# Patient Record
Sex: FEMALE | Race: BLACK OR AFRICAN AMERICAN | NOT HISPANIC OR LATINO | Employment: FULL TIME | URBAN - METROPOLITAN AREA
[De-identification: names, ages, dates, MRNs, and addresses within clinical notes are randomized per-mention and may not be internally consistent; named-entity substitution may affect disease eponyms.]

---

## 2018-02-20 PROBLEM — E04.1 THYROID NODULE: Status: ACTIVE | Noted: 2018-02-20

## 2018-02-20 PROBLEM — M18.12 OSTEOARTHRITIS OF LEFT THUMB: Status: ACTIVE | Noted: 2018-02-20

## 2018-02-20 PROBLEM — D70.9 NEUTROPENIA: Status: ACTIVE | Noted: 2018-02-20

## 2018-05-22 ENCOUNTER — OFFICE VISIT (OUTPATIENT)
Dept: INTERNAL MEDICINE | Facility: CLINIC | Age: 48
End: 2018-05-22
Payer: COMMERCIAL

## 2018-05-22 VITALS
TEMPERATURE: 98 F | SYSTOLIC BLOOD PRESSURE: 146 MMHG | WEIGHT: 135 LBS | BODY MASS INDEX: 24.84 KG/M2 | OXYGEN SATURATION: 97 % | RESPIRATION RATE: 16 BRPM | HEART RATE: 88 BPM | DIASTOLIC BLOOD PRESSURE: 88 MMHG | HEIGHT: 62 IN

## 2018-05-22 DIAGNOSIS — R10.31 COLICKY RLQ ABDOMINAL PAIN: Primary | ICD-10-CM

## 2018-05-22 DIAGNOSIS — I10 HTN (HYPERTENSION), BENIGN: ICD-10-CM

## 2018-05-22 DIAGNOSIS — Z87.448 HISTORY OF SIMPLE RENAL CYST: ICD-10-CM

## 2018-05-22 DIAGNOSIS — Z12.39 BREAST CANCER SCREENING: ICD-10-CM

## 2018-05-22 DIAGNOSIS — N39.0 URINARY TRACT INFECTION WITHOUT HEMATURIA, SITE UNSPECIFIED: ICD-10-CM

## 2018-05-22 DIAGNOSIS — Z13.220 SCREENING FOR HYPERLIPIDEMIA: ICD-10-CM

## 2018-05-22 DIAGNOSIS — K76.89 HEPATIC CYST: ICD-10-CM

## 2018-05-22 DIAGNOSIS — Z86.39 HISTORY OF THYROID NODULE: ICD-10-CM

## 2018-05-22 DIAGNOSIS — N83.201 CYST OF RIGHT OVARY: ICD-10-CM

## 2018-05-22 PROCEDURE — 3077F SYST BP >= 140 MM HG: CPT | Mod: ,,, | Performed by: INTERNAL MEDICINE

## 2018-05-22 PROCEDURE — 99214 OFFICE O/P EST MOD 30 MIN: CPT | Mod: ,,, | Performed by: INTERNAL MEDICINE

## 2018-05-22 PROCEDURE — 3079F DIAST BP 80-89 MM HG: CPT | Mod: ,,, | Performed by: INTERNAL MEDICINE

## 2018-05-22 PROCEDURE — 3008F BODY MASS INDEX DOCD: CPT | Mod: ,,, | Performed by: INTERNAL MEDICINE

## 2018-05-22 RX ORDER — LISINOPRIL 10 MG/1
10 TABLET ORAL DAILY
Qty: 90 TABLET | Refills: 1 | Status: SHIPPED | OUTPATIENT
Start: 2018-05-22 | End: 2019-09-19 | Stop reason: SDUPTHER

## 2018-05-22 RX ORDER — CIPROFLOXACIN 500 MG/1
500 TABLET ORAL EVERY 12 HOURS
Qty: 20 TABLET | Refills: 0 | Status: SHIPPED | OUTPATIENT
Start: 2018-05-22 | End: 2018-06-06

## 2018-05-22 NOTE — PATIENT INSTRUCTIONS
High Blood Pressure, New, Begin Treatment  Your blood pressure was high enough today to start treatment with medicines. Often health care providers dont know what causes high blood pressure (hypertension). But it can be controlled with lifestyle changes and medicines. High blood pressure usually has no symptoms. But it can sometimes cause headache, dizziness, blurred vision, a rushing sound in your ears, chest pain, or shortness of breath. But even without symptoms, high blood pressure thats not treated raises your risk for heart attack and stroke. High blood pressure is a serious health risk and shouldnt be ignored.    A blood pressure reading is made up of two numbers: a higher number over a lower number. The top number is the systolic pressure. The bottom number is the diastolic pressure. A normal blood pressure is a systolic pressure of less than 120 over a diastolic pressure less than 80. You will see your blood pressure readings written together. For example, a person with a systolic pressure of 118 and a diastolic pressure of 78 will have 118/78 written in the medical record.  High blood pressure is when either the top number is 140 or higher, or the bottom number is 90 or higher. High blood pressure is diagnosed when multiple, separate readings show blood pressures above 140/90. The blood pressures between normal and high are called prehypertension.  Home care  If you have high blood pressure, you should do what is listed below to lower your blood pressure. If you are taking medicines for high blood pressure, these methods may reduce or end your need for medicines in the future.  · Begin a weight-loss program if you are overweight.  · Cut back on how much salt you get in your diet. Heres how to do this:  ¨ Dont eat foods that have a lot of salt. These include olives, pickles, smoked meats, and salted potato chips.  ¨ Dont add salt to your food at the table.  ¨ Use only small amounts of salt when  cooking.  ¨ Review food labels to track how much salt is in prepared foods.  ¨ When eating out, ask that no additional salt be added to your food order.  · Begin an exercise program. Talk with your health care provider about the type of exercise program that would be best for you. It doesn't have to be hard. Even brisk walking for 20 minutes 3 times a week is a good form of exercise.  · Dont take medicines that have heart stimulants. This includes many over-the-counter cold and sinus decongestant pills and sprays, as well as diet pills. Check the warnings about hypertension on the label. Before purchasing any over-the-counter medicines or supplements, always ask the pharmacist about the product's potential interaction with your high blood pressure and your high blood pressure medicines.  · Stimulants such as amphetamine or cocaine could be lethal for someone with high blood pressure. Never take these.  · Limit how much caffeine you get in your diet. Switch to caffeine-free products.  · Stop smoking. If you are a long-time smoker, this can be hard. Enroll in a stop-smoking program to make it more likely that you will quit for good.  · Learn how to handle stress. This is an important part of any program to lower blood pressure. Learn about relaxation methods like meditation, yoga, or biofeedback.  · If your provider prescribed medicines, take them exactly as directed. Missing doses may cause your blood pressure get out of control.  · If you miss a dose or doses, check with your healthcare provider or pharmacist about what to do.  · Consider buying an automatic blood pressure machine. Your provider can make a recommendation. You can get one of these at most pharmacies.  The American Heart Association recommends the following guidelines for home blood pressure monitoring:  · Don't smoke or drink coffee for 30 minutes before taking your blood pressure.  · Go to the bathroom before the test.  · Relax for 5 minutes before  taking the measurement.  · Sit with your back supported (don't sit on a couch or soft chair); keep your feet on the floor uncrossed. Place your arm on a solid flat surface (like a table) with the upper part of the arm at heart level. Place the middle of the cuff directly above the eye of the elbow. Check the monitor's instruction manual for an illustration.  · Take multiple readings. When you measure, take 2 to 3 readings one minute apart and record all of the results.  · Take your blood pressure at the same time every day, or as your healthcare provider recommends.  · Record the date, time, and blood pressure reading.  · Take the record with you to your next medical appointment. If your blood pressure monitor has a built-in memory, simply take the monitor with you to your next appointment.  · Call your provider if you have several high readings. Don't be frightened by a single high blood pressure reading, but if you get several high readings, check in with your healthcare provider.  · Note: When blood pressure reaches a systolic (top number) of 180 or higher OR diastolic (bottom number) of 110 or higher, seek emergency medical treatment.  Follow-up care  Because a new blood pressure medicine was started today, its important that you have your blood pressure rechecked. This is to make sure that the medicine is working and that you have no serious side effects. Keep all your follow up appointments. Write down medicine and blood pressure questions and bring them to your next appointment. If you have pressing concerns about your new medicine or your blood pressure, call your provider. Unless told otherwise, follow up with your health care provider or this facility within the next 3 days.  When to seek medical advice  Call your healthcare provider right away if any of these occur:  · Blood pressure reaches a systolic (top number) of 180 or higher, OR diastolic (bottom number) of 110 or higher, seek emergency medical  treatment.  · Chest pain or shortness of breath  · Severe headache  · Throbbing or rushing sound in the ears  · Nosebleed  · Sudden severe pain in your belly (abdomen)  · Extreme drowsiness, confusion, or fainting  · Dizziness or dizziness with a spinning sensation (vertigo)  · Weakness of an arm or leg or one side of the face  · You have problems speaking or seeing   Date Last Reviewed: 12/1/2016  © 3381-2394 [employer]. [address] [place] [id number]. All rights reserved. This information is not intended as a substitute for professional medical care. Always follow your healthcare professional's instructions.

## 2018-05-22 NOTE — PROGRESS NOTES
SUBJECTIVE:    Patient ID: Jada Fcuhs is a 48 y.o. female.    Chief Complaint: Follow-up; Hypothyroidism; and Hypertension (went to urgent care for uti on 04/27/18 bp was 173/104)    HPI    Patient went to urgent care with dysuria and she had headache and BP was 173/104--She got treated for UTI and she felt better-she has had some pain in the lower abdomen and she is going to have AF/U from colonoscopy -she has had two more episodes of the lower right side which is intense-has to ball up and take ibuprofen 800 mg and tramadol she had from past-pain comes without radiation, longest lasting for an hour but usually 20-30 min-crescendo-decrescendo pattern-no BM associated with same-BM's are normal-has appendix-CT right lower quadrant , multiple hepatic cysts, and a right renal cyst but that was 2016-another ev al included dx of IB'S and sounds like librax was given and gave some relief-she has alternating constipation and diarrhea-      Past Medical History:   Diagnosis Date    Anemia     probably secondary to fibroid    Blood transfusion     History of uterine fibroid     Hypothyroidism     Wears glasses     CONTACS     Social History     Social History    Marital status:      Spouse name: N/A    Number of children: N/A    Years of education: N/A     Occupational History    Not on file.     Social History Main Topics    Smoking status: Never Smoker    Smokeless tobacco: Never Used    Alcohol use Yes      Comment: OCC    Drug use: No    Sexual activity: Not on file     Other Topics Concern    Not on file     Social History Narrative    No narrative on file     Past Surgical History:   Procedure Laterality Date    BREAST SURGERY      mass removed    HYSTERECTOMY       Family History   Problem Relation Age of Onset    Cancer Maternal Grandfather         prostate    Hypertension Mother     Seizures Maternal Grandmother     Aneurysm Father        Review of Systems   Constitutional:  Negative for appetite change, chills, diaphoresis, fatigue, fever and unexpected weight change.   HENT: Negative for congestion, ear pain, hearing loss, nosebleeds, postnasal drip, sinus pain, sinus pressure, sneezing, sore throat, tinnitus, trouble swallowing and voice change.    Eyes: Negative for photophobia, pain, itching and visual disturbance.   Respiratory: Negative for apnea, cough, chest tightness, shortness of breath, wheezing and stridor.    Cardiovascular: Negative for chest pain, palpitations and leg swelling.   Gastrointestinal: Negative for abdominal distention, abdominal pain, blood in stool, constipation, diarrhea, nausea and vomiting.        HPI   Endocrine: Negative for cold intolerance, heat intolerance, polydipsia and polyuria.   Genitourinary: Negative for difficulty urinating, dyspareunia, dysuria, flank pain, frequency, hematuria, menstrual problem, pelvic pain, urgency, vaginal discharge and vaginal pain.   Musculoskeletal: Negative for arthralgias, back pain, joint swelling, myalgias, neck pain and neck stiffness.   Skin: Negative for pallor.   Allergic/Immunologic: Negative for environmental allergies and food allergies.   Neurological: Negative for dizziness, tremors, speech difficulty, weakness, light-headedness and numbness.   Hematological: Does not bruise/bleed easily.   Psychiatric/Behavioral: Negative for agitation, confusion, decreased concentration, sleep disturbance and suicidal ideas. The patient is not nervous/anxious.           Objective:      Physical Exam   Constitutional: She is oriented to person, place, and time. She appears well-developed and well-nourished. She is cooperative. No distress.   HENT:   Head: Normocephalic and atraumatic.   Right Ear: Tympanic membrane normal.   Left Ear: Tympanic membrane normal.   Mouth/Throat: Uvula is midline and mucous membranes are normal.   Eyes: Conjunctivae, EOM and lids are normal. Pupils are equal, round, and reactive to light.  Right pupil is round and reactive. Left pupil is round and reactive.   Neck: Trachea normal and normal range of motion. Neck supple. No JVD present. No thyromegaly present.   Cardiovascular: Normal rate, regular rhythm and normal heart sounds.    Pulmonary/Chest: Effort normal and breath sounds normal. No tachypnea. No respiratory distress.   Abdominal: Soft. Bowel sounds are normal. There is no tenderness.   No tenderness-bowel sounds normal   Musculoskeletal: Normal range of motion.   Lymphadenopathy:     She has no cervical adenopathy.   Neurological: She is alert and oriented to person, place, and time. She has normal strength.   Skin: Skin is warm and dry. No rash noted.   Psychiatric: She has a normal mood and affect. Her speech is normal.   Nursing note and vitals reviewed.          Assessment:       1. Colicky RLQ abdominal pain    2. HTN (hypertension), benign    3. History of thyroid nodule    4. History of simple renal cyst    5. Hepatic cyst    6. Cyst of right ovary    7. Breast cancer screening    8. Screening for hyperlipidemia    9. Urinary tract infection without hematuria, site unspecified         Plan:           Colicky RLQ abdominal pain  -     CBC auto differential; Future; Expected date: 05/22/2018  -     Comprehensive metabolic panel; Future; Expected date: 05/22/2018    Hypertension        -     Lisinopril 10 mg no 90 with 1 refill    History of thyroid nodule  -     US Soft Tissue Head Neck Thyroid; Future; Expected date: 05/22/2018    History of simple renal cyst  -     TSH; Future; Expected date: 05/22/2018  -     POCT Urinalysis    Hepatic cyst        -     Re-evaluate on CT    Cyst of right ovary        -     Evaluate on CT    Breast cancer screening  -     Mammo Digital Screening Bilat with Tomos; Future; Expected date: 05/22/2018    Screening for hyperlipidemia  -     Lipid panel; Future; Expected date: 05/22/2018    Urinary Tract Infection  -     Cipro 500 bid for ten days no  20

## 2018-05-23 ENCOUNTER — TELEPHONE (OUTPATIENT)
Dept: INTERNAL MEDICINE | Facility: CLINIC | Age: 48
End: 2018-05-23

## 2018-05-23 NOTE — TELEPHONE ENCOUNTER
Told the patient and she will come in for a bp check in one week    She would like to know what vitamin you recommended at her visis?

## 2018-05-23 NOTE — TELEPHONE ENCOUNTER
----- Message from Dary Ace MD sent at 5/23/2018  2:25 PM CDT -----  Needs BP recheck in a week-Nurse visit

## 2018-05-23 NOTE — TELEPHONE ENCOUNTER
----- Message from Sherry Ibrahim sent at 5/23/2018 10:43 AM CDT -----  Contact: Shanice Rodrigues was seen in office yesterday and is requesting the name of the vitamin Dr. Ace recommended to her.

## 2018-06-05 LAB
ALBUMIN SERPL-MCNC: 4.4 G/DL (ref 3.1–4.7)
ALP SERPL-CCNC: 47 IU/L (ref 40–104)
ALT (SGPT): 13 IU/L (ref 3–33)
AST SERPL-CCNC: 21 IU/L (ref 10–40)
BASOPHILS NFR BLD: 0 K/UL (ref 0–0.2)
BASOPHILS NFR BLD: 0.2 %
BILIRUB SERPL-MCNC: 0.6 MG/DL (ref 0.3–1)
BUN SERPL-MCNC: 14 MG/DL (ref 8–20)
CALCIUM SERPL-MCNC: 9.2 MG/DL (ref 7.7–10.4)
CHLORIDE: 103 MMOL/L (ref 98–110)
CO2 SERPL-SCNC: 25.3 MMOL/L (ref 22.8–31.6)
CREATININE: 0.65 MG/DL (ref 0.6–1.4)
EOSINOPHIL NFR BLD: 0.1 K/UL (ref 0–0.7)
EOSINOPHIL NFR BLD: 1.2 %
ERYTHROCYTE [DISTWIDTH] IN BLOOD BY AUTOMATED COUNT: 13.2 % (ref 11.7–14.9)
GLUCOSE: 77 MG/DL (ref 70–99)
GRAN #: 2.8 K/UL (ref 1.4–6.5)
GRAN%: 69.5 %
HCT VFR BLD AUTO: 37.7 % (ref 36–48)
HGB BLD-MCNC: 12.5 G/DL (ref 12–15)
IMMATURE GRANS (ABS): 0 K/UL (ref 0–1)
IMMATURE GRANULOCYTES: 0.2 %
LYMPH #: 0.9 K/UL (ref 1.2–3.4)
LYMPH%: 22.7 %
MCH RBC QN AUTO: 29.1 PG (ref 25–35)
MCHC RBC AUTO-ENTMCNC: 33.2 G/DL (ref 31–36)
MCV RBC AUTO: 87.7 FL (ref 79–98)
MONO #: 0.3 K/UL (ref 0.1–0.6)
MONO%: 6.2 %
NUCLEATED RBCS: 0 %
PLATELET # BLD AUTO: 168 K/UL (ref 140–440)
PMV BLD AUTO: 12.7 FL (ref 8.8–12.7)
POTASSIUM SERPL-SCNC: 4 MMOL/L (ref 3.5–5)
PROT SERPL-MCNC: 7.2 G/DL (ref 6–8.2)
RBC # BLD AUTO: 4.3 M/UL (ref 3.5–5.5)
SODIUM: 137 MMOL/L (ref 134–144)
TSH SERPL DL<=0.005 MIU/L-ACNC: 1 ULU/ML (ref 0.3–5.6)
WBC # BLD AUTO: 4.1 K/UL (ref 5–10)

## 2018-06-06 ENCOUNTER — OFFICE VISIT (OUTPATIENT)
Dept: INTERNAL MEDICINE | Facility: CLINIC | Age: 48
End: 2018-06-06
Payer: COMMERCIAL

## 2018-06-06 VITALS
HEIGHT: 62 IN | RESPIRATION RATE: 19 BRPM | WEIGHT: 137 LBS | DIASTOLIC BLOOD PRESSURE: 85 MMHG | SYSTOLIC BLOOD PRESSURE: 135 MMHG | HEART RATE: 89 BPM | BODY MASS INDEX: 25.21 KG/M2 | OXYGEN SATURATION: 99 % | TEMPERATURE: 99 F

## 2018-06-06 DIAGNOSIS — L65.9 HAIR LOSS: ICD-10-CM

## 2018-06-06 DIAGNOSIS — G89.29 CHRONIC RLQ PAIN: Primary | ICD-10-CM

## 2018-06-06 DIAGNOSIS — R10.31 CHRONIC RLQ PAIN: Primary | ICD-10-CM

## 2018-06-06 PROCEDURE — 3079F DIAST BP 80-89 MM HG: CPT | Mod: ,,, | Performed by: NURSE PRACTITIONER

## 2018-06-06 PROCEDURE — 3008F BODY MASS INDEX DOCD: CPT | Mod: ,,, | Performed by: NURSE PRACTITIONER

## 2018-06-06 PROCEDURE — 3075F SYST BP GE 130 - 139MM HG: CPT | Mod: ,,, | Performed by: NURSE PRACTITIONER

## 2018-06-06 PROCEDURE — 99214 OFFICE O/P EST MOD 30 MIN: CPT | Mod: ,,, | Performed by: NURSE PRACTITIONER

## 2018-06-06 RX ORDER — ACETAMINOPHEN AND PHENYLEPHRINE HCL 325; 5 MG/1; MG/1
1 TABLET ORAL DAILY
COMMUNITY
Start: 2018-06-06 | End: 2020-02-19

## 2018-06-06 RX ORDER — DICYCLOMINE HYDROCHLORIDE 10 MG/1
10 CAPSULE ORAL
Qty: 120 CAPSULE | Refills: 1 | Status: SHIPPED | OUTPATIENT
Start: 2018-06-06 | End: 2019-09-05

## 2018-06-06 NOTE — PROGRESS NOTES
"    SUBJECTIVE:      Patient ID: Jada Fuchs is a 48 y.o. female.    Chief Complaint: Abdominal Pain (had episode last night (Lasted about 30 minutes- guac with dinner, CT denied by [employer]. Other labs and DI in chart for review.)    C/o ongoing RLQ abd pain, states no one seems to be able to tell her what's wrong, tired of feeling bad, can't associate episodes with any food or activity, "exhausted from episodes of pain"    Had colonoscopy with JAMAL Armenta in August    Scheduled appt with gyn in August to see if it's female-related issue      Abdominal Pain   This is a recurrent problem. The current episode started more than 1 year ago. The onset quality is sudden. The problem occurs every several days. Duration: 30 min. The problem has been unchanged. The pain is located in the RLQ. The pain is at a severity of 10/10. The pain is severe. The quality of the pain is colicky. The abdominal pain radiates to the RLQ. Associated symptoms include flatus (occasional). Pertinent negatives include no anorexia, arthralgias, belching, constipation, diarrhea, dysuria, fever, frequency, headaches, hematochezia, hematuria, melena, myalgias, nausea, vomiting or weight loss. The pain is aggravated by certain positions and eating. The pain is relieved by nothing. She has tried oral narcotic analgesics (gas pills) for the symptoms. The treatment provided no relief. Prior diagnostic workup includes lower endoscopy, GI consult and ultrasound (CT denied by insurance). There is no history of abdominal surgery, colon cancer, Crohn's disease, gallstones, GERD, irritable bowel syndrome, pancreatitis, PUD or ulcerative colitis. Patient's medical history does not include kidney stones and UTI.       Past Surgical History:   Procedure Laterality Date    BREAST SURGERY      mass removed    HYSTERECTOMY       Family History   Problem Relation Age of Onset    Cancer Maternal Grandfather         prostate    Hypertension Mother     " Seizures Maternal Grandmother     Aneurysm Father       Social History     Social History    Marital status:      Spouse name: N/A    Number of children: N/A    Years of education: N/A     Social History Main Topics    Smoking status: Never Smoker    Smokeless tobacco: Never Used    Alcohol use Yes      Comment: OCC    Drug use: No    Sexual activity: Not Asked     Other Topics Concern    None     Social History Narrative    None     Current Outpatient Prescriptions   Medication Sig Dispense Refill    lisinopril 10 MG tablet Take 1 tablet (10 mg total) by mouth once daily. 90 tablet 1    biotin 10,000 mcg Cap Take 1 capsule by mouth once daily.      dicyclomine (BENTYL) 10 MG capsule Take 1 capsule (10 mg total) by mouth 4 (four) times daily before meals and nightly. 120 capsule 1     No current facility-administered medications for this visit.      Review of patient's allergies indicates:  No Known Allergies   Past Medical History:   Diagnosis Date    Abdominal pain     Anemia     probably secondary to fibroid    Blood transfusion     History of uterine fibroid     Hypothyroidism     Wears glasses     CONTACS     Past Surgical History:   Procedure Laterality Date    BREAST SURGERY      mass removed    HYSTERECTOMY         Review of Systems   Constitutional: Negative for activity change, appetite change, fatigue, fever, unexpected weight change and weight loss.   HENT: Negative for congestion, ear pain, hearing loss, postnasal drip, sinus pain, sinus pressure, sneezing and sore throat.    Eyes: Negative for photophobia and pain.   Respiratory: Negative for cough, chest tightness, shortness of breath and wheezing.    Cardiovascular: Negative for chest pain, palpitations and leg swelling.   Gastrointestinal: Positive for abdominal pain and flatus (occasional). Negative for abdominal distention, anorexia, blood in stool, constipation, diarrhea, hematochezia, melena, nausea and vomiting.  "  Endocrine: Negative for cold intolerance, heat intolerance, polydipsia and polyuria.        Losing hair on scalp   Genitourinary: Positive for dyspareunia (rare). Negative for difficulty urinating, dysuria, flank pain, frequency, hematuria, pelvic pain and urgency.   Musculoskeletal: Negative for arthralgias, back pain, joint swelling, myalgias and neck pain.   Skin: Negative for pallor.   Allergic/Immunologic: Negative for environmental allergies and food allergies.   Neurological: Negative for dizziness, weakness, light-headedness, numbness and headaches.   Hematological: Does not bruise/bleed easily.   Psychiatric/Behavioral: Negative for agitation, confusion, decreased concentration and sleep disturbance. The patient is not nervous/anxious.       OBJECTIVE:      Vitals:    06/06/18 1514   BP: 135/85   Pulse: 89   Resp: 19   Temp: 98.5 °F (36.9 °C)   SpO2: 99%   Weight: 62.1 kg (137 lb)   Height: 5' 2" (1.575 m)     Physical Exam   Constitutional: She is oriented to person, place, and time. Vital signs are normal. She appears well-developed and well-nourished. She is cooperative. No distress.   HENT:   Head: Normocephalic and atraumatic.   Right Ear: Hearing normal.   Left Ear: Hearing normal.   Nose: Nose normal. No rhinorrhea.   Mouth/Throat: Uvula is midline, oropharynx is clear and moist and mucous membranes are normal.   Eyes: Conjunctivae and lids are normal. Pupils are equal, round, and reactive to light. Right eye exhibits no discharge. Left eye exhibits no discharge. Right conjunctiva is not injected. Left conjunctiva is not injected. Right pupil is round and reactive. Left pupil is round and reactive. Pupils are equal.   Neck: Trachea normal and normal range of motion. Neck supple. No JVD present. No tracheal deviation present. No thyromegaly present.   Cardiovascular: Normal rate, regular rhythm, normal heart sounds and intact distal pulses.  Exam reveals no gallop and no friction rub.    No murmur " heard.  Pulses:       Radial pulses are 2+ on the right side, and 2+ on the left side.   Pulmonary/Chest: Effort normal and breath sounds normal. No stridor. No respiratory distress. She has no decreased breath sounds. She has no wheezes. She has no rhonchi. She has no rales.   Abdominal: Soft. Normal appearance and bowel sounds are normal. She exhibits no distension and no abdominal bruit. There is no tenderness. There is no rigidity and no guarding.   Musculoskeletal: Normal range of motion. She exhibits no edema.   Lymphadenopathy:     She has no cervical adenopathy.   Neurological: She is alert and oriented to person, place, and time. She has normal strength. She displays no atrophy. She displays a negative Romberg sign. Coordination and gait normal. GCS eye subscore is 4. GCS verbal subscore is 5. GCS motor subscore is 6.   Skin: Skin is warm and dry. Capillary refill takes less than 2 seconds. No lesion and no rash noted. No cyanosis. No pallor.   Psychiatric: She has a normal mood and affect. Her speech is normal and behavior is normal. Judgment and thought content normal. Cognition and memory are normal. She is attentive.   Nursing note and vitals reviewed.     Assessment:       1. Chronic RLQ pain    2. Hair loss        Plan:       Chronic RLQ pain  -     US Abdomen Complete  -     US Pelvis Complete Non OB; Future; Expected date: 06/06/2018  -     dicyclomine (BENTYL) 10 MG capsule; Take 1 capsule (10 mg total) by mouth 4 (four) times daily before meals and nightly.  Dispense: 120 capsule; Refill: 1    Hair loss  -     biotin 10,000 mcg Cap; Take 1 capsule by mouth once daily.      Follow-up in about 4 weeks (around 7/4/2018) for imaging results.      6/10/2018 VIRAJ Camacho, KOMAL[facility]-C

## 2018-06-06 NOTE — PATIENT INSTRUCTIONS
Abdominal Pain    Abdominal pain is pain in the stomach or belly area. Everyone has this pain from time to time. In many cases it goes away on its own. But abdominal pain can sometimes be due to a serious problem, such as appendicitis. So its important to know when to seek help.  Causes of abdominal pain  There are many possible causes of abdominal pain. Common causes in adults include:  · Constipation, diarrhea, or gas  · Stomach acid flowing back up into the esophagus (acid reflux or heartburn)  · Severe acid reflux, called GERD (gastroesophageal reflux disease)  · A sore in the lining of the stomach or small intestine (peptic ulcer)  · Inflammation of the gallbladder, liver, or pancreas  · Gallstones or kidney stones  · Appendicitis   · Intestinal blockage   · An internal organ pushing through a muscle or other tissue (hernia)  · Urinary tract infections  · In women, menstrual cramps, fibroids, or endometriosis  · Inflammation or infection of the intestines  Diagnosing the cause of abdominal pain  Your healthcare provider will do a physical exam help find the cause of your pain. If needed, tests will be ordered. Belly pain has many possible causes. So it can be hard to find the reason for your pain. Giving details about your pain can help. Tell your provider where and when you feel the pain, and what makes it better or worse. Also let your provider know if you have other symptoms such as:  · Fever  · Tiredness  · Upset stomach (nausea)  · Vomiting  · Changes in bathroom habits  Treating abdominal pain  Some causes of pain need emergency medical treatment right away. These include appendicitis or a bowel blockage. Other problems can be treated with rest, fluids, or medicines. Your healthcare provider can give you specific instructions for treatment or self-care based on what is causing your pain.  If you have vomiting or diarrhea, sip water or other clear fluids. When you are ready to eat solid foods again,  start with small amounts of easy-to-digest, low-fat foods. These include apple sauce, toast, or crackers.   When to seek medical care  Call 911 or go to the hospital right away if you:  · Cant pass stool and are vomiting  · Are vomiting blood or have bloody diarrhea or black, tarry diarrhea  · Have chest, neck, or shoulder pain  · Feel like you might pass out  · Have pain in your shoulder blades with nausea  · Have sudden, severe belly pain  · Have new, severe pain unlike any you have felt before  · Have a belly that is rigid, hard, and tender to touch  Call your healthcare provider if you have:  · Pain for more than 5 days  · Bloating for more than 2 days  · Diarrhea for more than 5 days  · A fever of 100.4°F (38.0°C) or higher, or as directed by your provider  · Pain that gets worse  · Weight loss for no reason  · Continued lack of appetite  · Blood in your stool  How to prevent abdominal pain  Here are some tips to help prevent abdominal pain:  · Eat smaller amounts of food at one time.  · Avoid greasy, fried, or other high-fat foods.  · Avoid foods that give you gas.  · Exercise regularly.  · Drink plenty of fluids.  To help prevent GERD symptoms:  · Quit smoking.  · Reduce alcohol and certain foods that increase stomach acid.  · Avoid aspirin and over-the-counter pain and fever medicines (NSAIDS or nonsteroidal anti-inflammatory drugs), if possible  · Lose extra weight.  · Finish eating at least 2 hours before you go to bed or lie down.  · Raise the head of your bed.  Date Last Reviewed: 7/1/2016  © 1885-6261 [employer]. [address] [place] [address], [place] [address]. All rights reserved. This information is not intended as a substitute for professional medical care. Always follow your healthcare professional's instructions.        Unknown Causes of Abdominal Pain (Female)    The exact cause of your abdominal (stomach) pain is not clear. This does not mean that this is something to worry about.  Everyone likes to know the exact cause of the problem, but sometimes with abdominal pain, there is no clear-cut cause, and this could be a good thing. The good news is that your symptoms can be treated, and you will feel better.   Your condition does not seem serious now; however, sometimes the signs of a serious problem may take more time to appear. For this reason, it is important for you to watch for any new symptoms, problems, or worsening of your condition.  Over the next few days, the abdominal pain may come and go, or be continuous. Other common symptoms can include nausea and vomiting. Sometimes it can be difficult to tell if you feel nauseous, you may just feel bad and not associate that feeling with nausea. Constipation, diarrhea, and a fever may go along with the pain.  The pain may continue even if treated correctly over the following days. Depending on how things go, sometimes the cause can become clear and may require further or different treatment. Additional evaluations, medications, or tests may also be needed.  Home care  Your healthcare provider may prescribe medicine for pain, symptoms, or an infection.  Follow the healthcare provider's instructions for taking these medicines.  General care  · Rest as much as you can until your next exam. No strenuous activities.  · Try to find positions that ease discomfort. A small pillow placed on the abdomen may help relieve pain.  · Something warm on your abdomen (such as a heating pad) may help, but be careful not to burn yourself.  Diet  · Do not force yourself to eat, especially if having cramps, vomiting, or diarrhea.  · Water is important so you do not get dehydrated. Soup may also be good. Sports drinks may also help, especially if they are not too acidic. Make sure you don't drink sugary drinks as this can make things worse. Take liquids in small amounts. Do not guzzle them.  · Caffeine sometimes makes the pain and cramping worse.  · Avoid dairy  products if you have vomiting or diarrhea.  · Don't eat large amounts at a time. Wait a few minutes between bites.  · Eat a diet low in fiber (called a low-residue diet). Foods allowed include refined breads, white rice, fruit and vegetable juices without pulp, tender meats. These foods will pass more easily through the intestine.  · Avoid whole-grain foods, whole fruits and vegetables, meats, seeds and nuts, fried or fatty foods, dairy, alcohol and spicy foods until your symptoms go away.  Follow-up care  Follow up with your healthcare provider, or as advised, if your pain does not begin to improve in the next 24 hours.  Call 911  Call 911 if any of these occur:  · Trouble breathing  · Confusion  · Fainting or loss of consciousness  · Rapid heart rate  · Seizure  When to seek medical advice  Call your healthcare provider right away if any of these occur:  · Pain gets worse or moves to the right lower abdomen  · New or worsening vomiting or diarrhea  · Swelling of the abdomen  · Unable to pass stool for more than 3 days  · Fever of 100.4ºF (38ºC) or higher, or as directed by your healthcare provider.  · Blood in vomit or bowel movements (dark red or black color)  · Jaundice (yellow color of eyes and skin)  · Weakness, dizziness  · Chest, arm, back, neck or jaw pain  · Unexpected vaginal bleeding or missed period  · Can't keep down liquids or water and are getting dehydrated  Date Last Reviewed: 12/30/2015  © 5446-8481 [employer]. [address] [place] [address], [place] [address]. All rights reserved. This information is not intended as a substitute for professional medical care. Always follow your healthcare professional's instructions.

## 2018-06-07 ENCOUNTER — TELEPHONE (OUTPATIENT)
Dept: INTERNAL MEDICINE | Facility: CLINIC | Age: 48
End: 2018-06-07

## 2018-06-07 NOTE — TELEPHONE ENCOUNTER
----- Message from Dary Ace MD sent at 6/5/2018  6:10 PM CDT -----  Please call the patient regarding her abnormal result.-thyroid stable

## 2018-06-07 NOTE — TELEPHONE ENCOUNTER
----- Message from Dary Ace MD sent at 6/5/2018  6:12 PM CDT -----  Please call the patient regarding her abnormal result.-continues with low WBC-rest of labs good

## 2018-06-07 NOTE — TELEPHONE ENCOUNTER
CALLED THE PATIENT AND MADE SURE HER LABS WERE TALKED ABOUT YESTERDAY AT APPOINTMENT SHE VOICED THAT THEY WERE BUT THANKED ME FOR CALLING HER.

## 2018-07-05 ENCOUNTER — OFFICE VISIT (OUTPATIENT)
Dept: INTERNAL MEDICINE | Facility: CLINIC | Age: 48
End: 2018-07-05
Payer: COMMERCIAL

## 2018-07-05 VITALS
RESPIRATION RATE: 17 BRPM | BODY MASS INDEX: 24.84 KG/M2 | DIASTOLIC BLOOD PRESSURE: 84 MMHG | WEIGHT: 135 LBS | HEIGHT: 62 IN | SYSTOLIC BLOOD PRESSURE: 130 MMHG | OXYGEN SATURATION: 99 % | TEMPERATURE: 98 F | HEART RATE: 85 BPM

## 2018-07-05 DIAGNOSIS — N83.202 BILATERAL OVARIAN CYSTS: Primary | ICD-10-CM

## 2018-07-05 DIAGNOSIS — N83.201 BILATERAL OVARIAN CYSTS: Primary | ICD-10-CM

## 2018-07-05 PROCEDURE — 3008F BODY MASS INDEX DOCD: CPT | Mod: ,,, | Performed by: NURSE PRACTITIONER

## 2018-07-05 PROCEDURE — 99214 OFFICE O/P EST MOD 30 MIN: CPT | Mod: ,,, | Performed by: NURSE PRACTITIONER

## 2018-07-05 PROCEDURE — 3079F DIAST BP 80-89 MM HG: CPT | Mod: ,,, | Performed by: NURSE PRACTITIONER

## 2018-07-05 PROCEDURE — 3075F SYST BP GE 130 - 139MM HG: CPT | Mod: ,,, | Performed by: NURSE PRACTITIONER

## 2018-07-05 NOTE — PROGRESS NOTES
SUBJECTIVE:      Patient ID: Jada Fuchs is a 48 y.o. female.    Chief Complaint: Follow-up (follow up for imaging - has not had further episodes)    FU for abd pain - hasn't had any problems since last visit, tired bentyl but didn't notice any difference as she wasn't having stomach problems, reviewed latest imaging - multiple cysts L ovary, recommended repeat US in 3 months, has gyn appt in August, GI FU with [facility] to review colonoscopy results in August      Abdominal Pain   This is a recurrent problem. The current episode started more than 1 year ago. The onset quality is sudden. The problem occurs intermittently. The problem has been resolved. The pain is located in the RLQ. The quality of the pain is colicky. Associated symptoms include flatus. Pertinent negatives include no anorexia, arthralgias, belching, constipation, diarrhea, dysuria, fever, frequency, headaches, hematochezia, hematuria, melena, myalgias, nausea, vomiting or weight loss. The pain is aggravated by eating and certain positions. Prior diagnostic workup includes GI consult, ultrasound and lower endoscopy. There is no history of abdominal surgery, colon cancer, Crohn's disease, gallstones, GERD, irritable bowel syndrome, pancreatitis, PUD or ulcerative colitis. Patient's medical history does not include kidney stones and UTI.       Past Surgical History:   Procedure Laterality Date    BREAST SURGERY      mass removed    HYSTERECTOMY  2015     Family History   Problem Relation Age of Onset    Cancer Maternal Grandfather         prostate    Hypertension Mother     Seizures Maternal Grandmother     Aneurysm Father       Social History     Social History    Marital status:      Spouse name: N/A    Number of children: 1    Years of education: N/A     Occupational History    case management      [employer]     Social History Main Topics    Smoking status: Never Smoker    Smokeless tobacco: Never Used     Alcohol use Yes      Comment: OCC    Drug use: No    Sexual activity: Not Asked     Other Topics Concern    None     Social History Narrative    None     Current Outpatient Prescriptions   Medication Sig Dispense Refill    biotin 10,000 mcg Cap Take 1 capsule by mouth once daily.      dicyclomine (BENTYL) 10 MG capsule Take 1 capsule (10 mg total) by mouth 4 (four) times daily before meals and nightly. 120 capsule 1    lisinopril 10 MG tablet Take 1 tablet (10 mg total) by mouth once daily. 90 tablet 1     No current facility-administered medications for this visit.      Review of patient's allergies indicates:  No Known Allergies   Past Medical History:   Diagnosis Date    Abdominal pain     Anemia     probably secondary to fibroid    Blood transfusion     History of uterine fibroid     Hypothyroidism     Wears glasses     CONTACS     Past Surgical History:   Procedure Laterality Date    BREAST SURGERY      mass removed    HYSTERECTOMY  2015       Review of Systems   Constitutional: Negative for activity change, appetite change, fatigue, fever, unexpected weight change and weight loss.   HENT: Negative for congestion, ear pain, hearing loss, postnasal drip, sinus pain, sinus pressure, sneezing and sore throat.    Eyes: Negative for photophobia and pain.   Respiratory: Negative for cough, chest tightness, shortness of breath and wheezing.    Cardiovascular: Negative for chest pain, palpitations and leg swelling.   Gastrointestinal: Positive for flatus. Negative for abdominal distention, abdominal pain, anorexia, blood in stool, constipation, diarrhea, hematochezia, melena, nausea and vomiting.   Endocrine: Negative for cold intolerance, heat intolerance, polydipsia and polyuria.   Genitourinary: Negative for difficulty urinating, dysuria, flank pain, frequency, hematuria, pelvic pain and urgency.   Musculoskeletal: Negative for arthralgias, back pain, joint swelling, myalgias and neck pain.   Skin:  "Negative for pallor.   Allergic/Immunologic: Negative for environmental allergies and food allergies.   Neurological: Negative for dizziness, weakness, light-headedness, numbness and headaches.   Hematological: Does not bruise/bleed easily.   Psychiatric/Behavioral: Positive for sleep disturbance (takes OTC generic unisom). Negative for agitation, confusion and decreased concentration. The patient is not nervous/anxious.       OBJECTIVE:      Vitals:    07/05/18 1314   BP: 130/84   Pulse: 85   Resp: 17   Temp: 98.2 °F (36.8 °C)   SpO2: 99%   Weight: 61.2 kg (135 lb)   Height: 5' 2" (1.575 m)     Physical Exam   Constitutional: She is oriented to person, place, and time. Vital signs are normal. She appears well-developed and well-nourished. She is cooperative. No distress.   HENT:   Head: Normocephalic and atraumatic.   Right Ear: Hearing normal.   Left Ear: Hearing normal.   Nose: Nose normal. No rhinorrhea.   Mouth/Throat: Uvula is midline, oropharynx is clear and moist and mucous membranes are normal. No oral lesions.   Eyes: Conjunctivae, EOM and lids are normal. Pupils are equal, round, and reactive to light. Right eye exhibits no discharge. Left eye exhibits no discharge. Right conjunctiva is not injected. Left conjunctiva is not injected. Right pupil is round and reactive. Left pupil is round and reactive. Pupils are equal.   Neck: Trachea normal and normal range of motion. Neck supple. No JVD present. Carotid bruit is not present. No tracheal deviation present. No thyromegaly present.   Cardiovascular: Normal rate, regular rhythm, normal heart sounds and intact distal pulses.  Exam reveals no gallop and no friction rub.    No murmur heard.  Pulses:       Radial pulses are 2+ on the right side, and 2+ on the left side.   Pulmonary/Chest: Effort normal and breath sounds normal. No stridor. No respiratory distress. She has no decreased breath sounds. She has no wheezes. She has no rhonchi. She has no rales. "   Abdominal: Soft. Bowel sounds are normal. She exhibits no distension. There is no hepatosplenomegaly. There is no tenderness. There is no rigidity and no guarding.   Musculoskeletal: Normal range of motion. She exhibits no edema.   Lymphadenopathy:     She has no cervical adenopathy.   Neurological: She is alert and oriented to person, place, and time. She has normal strength. She displays no atrophy. She displays a negative Romberg sign. Coordination and gait normal. GCS eye subscore is 4. GCS verbal subscore is 5. GCS motor subscore is 6.   Skin: Skin is warm and dry. Capillary refill takes less than 2 seconds. No lesion and no rash noted. No cyanosis. No pallor.   Psychiatric: She has a normal mood and affect. Her speech is normal and behavior is normal. Judgment and thought content normal. Cognition and memory are normal. She is attentive.   Nursing note and vitals reviewed.     Assessment:       1. Bilateral ovarian cysts        Plan:       Bilateral ovarian cysts  -     US Pelvis Complete Non OB; Future; Expected date: 09/03/2018        Follow-up in about 1 year (around 7/5/2019) for annual check.      7/9/2018 VIRAJ Camacho, FNP-C

## 2018-07-09 PROBLEM — N83.202 BILATERAL OVARIAN CYSTS: Status: ACTIVE | Noted: 2018-07-09

## 2018-07-09 PROBLEM — N83.201 BILATERAL OVARIAN CYSTS: Status: ACTIVE | Noted: 2018-07-09

## 2018-12-13 ENCOUNTER — PES CALL (OUTPATIENT)
Dept: ADMINISTRATIVE | Facility: CLINIC | Age: 48
End: 2018-12-13

## 2019-09-04 ENCOUNTER — OFFICE VISIT (OUTPATIENT)
Dept: DERMATOLOGY | Facility: CLINIC | Age: 49
End: 2019-09-04
Payer: COMMERCIAL

## 2019-09-04 VITALS — HEIGHT: 62 IN | BODY MASS INDEX: 24.83 KG/M2 | WEIGHT: 134.94 LBS

## 2019-09-04 DIAGNOSIS — L81.9 POSTINFLAMMATORY PIGMENTARY CHANGES: ICD-10-CM

## 2019-09-04 DIAGNOSIS — L73.8 FOLLICULAR ECZEMA: Primary | ICD-10-CM

## 2019-09-04 DIAGNOSIS — L70.0 ACNE VULGARIS: ICD-10-CM

## 2019-09-04 PROCEDURE — 99999 PR PBB SHADOW E&M-EST. PATIENT-LVL II: ICD-10-PCS | Mod: PBBFAC,,, | Performed by: DERMATOLOGY

## 2019-09-04 PROCEDURE — 99203 OFFICE O/P NEW LOW 30 MIN: CPT | Mod: S$GLB,,, | Performed by: DERMATOLOGY

## 2019-09-04 PROCEDURE — 3008F BODY MASS INDEX DOCD: CPT | Mod: CPTII,S$GLB,, | Performed by: DERMATOLOGY

## 2019-09-04 PROCEDURE — 3008F PR BODY MASS INDEX (BMI) DOCUMENTED: ICD-10-PCS | Mod: CPTII,S$GLB,, | Performed by: DERMATOLOGY

## 2019-09-04 PROCEDURE — 99203 PR OFFICE/OUTPT VISIT, NEW, LEVL III, 30-44 MIN: ICD-10-PCS | Mod: S$GLB,,, | Performed by: DERMATOLOGY

## 2019-09-04 PROCEDURE — 99999 PR PBB SHADOW E&M-EST. PATIENT-LVL II: CPT | Mod: PBBFAC,,, | Performed by: DERMATOLOGY

## 2019-09-04 RX ORDER — TRIAMCINOLONE ACETONIDE 1 MG/G
CREAM TOPICAL
Refills: 1 | COMMUNITY
Start: 2019-08-24 | End: 2020-12-02

## 2019-09-04 RX ORDER — THYROID 15 MG/1
TABLET ORAL
COMMUNITY
End: 2019-09-04 | Stop reason: ALTCHOICE

## 2019-09-04 RX ORDER — CLINDAMYCIN PHOSPHATE AND TRETINOIN 12; .25 MG/G; MG/G
GEL TOPICAL
Qty: 60 G | Refills: 3 | Status: SHIPPED | OUTPATIENT
Start: 2019-09-04 | End: 2020-12-02

## 2019-09-04 NOTE — PROGRESS NOTES
Subjective:       Patient ID:  Jada Fuchs is a 49 y.o. female who presents for   Chief Complaint   Patient presents with    Rash    Hair/Scalp Problem    Acne     New Pt  No Phx or Fhx of N[facility].    Pt is here today because she has rash on both elbows, both shins and side of R thigh. The rash itches and has present for about 7 months. She saw [facility] and was prescribed TAC. It has helped a little. She also has dark marks from acne       No new meds  Taking lisinopril approximately 2 years   History thyroid issues- goiter- enlarged. 4 years ago, took armour thyroid. Seeing Dr. Ace, stopped armour thyroid ~ 2 years ago.   Denies family history thyroid disease and lupus  Pt feels she has sensitive skin, sensitive to perfumes   Denies biopsy at [facility] derm     Acne on face, jawline. Using neutrogena face wash for acne. Compounded Rx cream in past for associated dark spots.   Hysterectomy 8 years ago.     Review of Systems   Constitutional: Negative for fever, chills and fatigue.   Skin: Positive for activity-related sunscreen use and wears hat. Negative for daily sunscreen use.   Hematologic/Lymphatic: Does not bruise/bleed easily.        Objective:    Physical Exam   Constitutional: She appears well-developed and well-nourished. No distress.   HENT:   Mouth/Throat: Lips normal.    Eyes: Lids are normal.  No conjunctival no injection.   Neurological: She is alert and oriented to person, place, and time. She is not disoriented.   Psychiatric: She has a normal mood and affect.   Skin:   Areas Examined (abnormalities noted in diagram):   Head / Face Inspection Performed  Neck Inspection Performed  Chest / Axilla Inspection Performed  Abdomen Inspection Performed  Back Inspection Performed  RUE Inspected  LUE Inspection Performed                   Diagram Legend     Erythematous scaling macule/papule c/w actinic keratosis       Vascular papule c/w angioma      Pigmented verrucoid papule/plaque c/w  seborrheic keratosis      Yellow umbilicated papule c/w sebaceous hyperplasia      Irregularly shaped tan macule c/w lentigo     1-2 mm smooth white papules consistent with Milia      Movable subcutaneous cyst with punctum c/w epidermal inclusion cyst      Subcutaneous movable cyst c/w pilar cyst      Firm pink to brown papule c/w dermatofibroma      Pedunculated fleshy papule(s) c/w skin tag(s)      Evenly pigmented macule c/w junctional nevus     Mildly variegated pigmented, slightly irregular-bordered macule c/w mildly atypical nevus      Flesh colored to evenly pigmented papule c/w intradermal nevus       Pink pearly papule/plaque c/w basal cell carcinoma      Erythematous hyperkeratotic cursted plaque c/w SCC      Surgical scar with no sign of skin cancer recurrence      Open and closed comedones      Inflammatory papules and pustules      Verrucoid papule consistent consistent with wart     Erythematous eczematous patches and plaques     Dystrophic onycholytic nail with subungual debris c/w onychomycosis     Umbilicated papule    Erythematous-base heme-crusted tan verrucoid plaque consistent with inflamed seborrheic keratosis     Erythematous Silvery Scaling Plaque c/w Psoriasis     See annotation      Assessment / Plan:        Follicular eczema  Discussed the following dry skin tips:    Avoid hot baths and showers, keep showers or baths brief (10-15 min), use a mild soap or cleanser, pat skin dry after showering and apply a moisturizer within 3 minutes of getting out of bath (cetaphil cream, ceraVe, aquaphor) and reapply moisturizers 2-4 times/day.   Safe list products only  Ddx: acd  Triamcinolone only prn itching and rash  discussed avoiding chronic use and to use only on affected areas- risk atrophy, striae, ecchymoses, hypopigmentation  TSH wnl 2018  Due for upcoming appt with Dr. Ace and will have re-checked    Postinflammatory pigmentary changes, arms and face  Discussed with patient the importance  of sun precautions, including broad spectrum sunscreen use with minimum SPF 30, wearing sun protective clothing and wide-brim hat as well as sun avoidance during peak hours between 10 am and 4 pm.   cerave am lotion samples today    Acne vulgaris  cerave am daily  ziana gel qhs, will send to [facility]           Follow up in about 3 months (around 12/4/2019).

## 2019-09-05 ENCOUNTER — OFFICE VISIT (OUTPATIENT)
Dept: FAMILY MEDICINE | Facility: CLINIC | Age: 49
End: 2019-09-05
Payer: COMMERCIAL

## 2019-09-05 VITALS
RESPIRATION RATE: 16 BRPM | DIASTOLIC BLOOD PRESSURE: 88 MMHG | HEIGHT: 62 IN | TEMPERATURE: 98 F | OXYGEN SATURATION: 98 % | SYSTOLIC BLOOD PRESSURE: 136 MMHG | HEART RATE: 90 BPM | BODY MASS INDEX: 24.11 KG/M2 | WEIGHT: 131 LBS

## 2019-09-05 DIAGNOSIS — Z12.39 SCREENING BREAST EXAMINATION: ICD-10-CM

## 2019-09-05 DIAGNOSIS — E04.2 MULTIPLE THYROID NODULES: ICD-10-CM

## 2019-09-05 DIAGNOSIS — D70.8 OTHER NEUTROPENIA: ICD-10-CM

## 2019-09-05 DIAGNOSIS — F51.01 PRIMARY INSOMNIA: Primary | ICD-10-CM

## 2019-09-05 DIAGNOSIS — Z00.00 ROUTINE GENERAL MEDICAL EXAMINATION AT A HEALTH CARE FACILITY: ICD-10-CM

## 2019-09-05 PROCEDURE — 3008F BODY MASS INDEX DOCD: CPT | Mod: S$GLB,,, | Performed by: INTERNAL MEDICINE

## 2019-09-05 PROCEDURE — 3079F PR MOST RECENT DIASTOLIC BLOOD PRESSURE 80-89 MM HG: ICD-10-PCS | Mod: S$GLB,,, | Performed by: INTERNAL MEDICINE

## 2019-09-05 PROCEDURE — 3075F SYST BP GE 130 - 139MM HG: CPT | Mod: S$GLB,,, | Performed by: INTERNAL MEDICINE

## 2019-09-05 PROCEDURE — 3008F PR BODY MASS INDEX (BMI) DOCUMENTED: ICD-10-PCS | Mod: S$GLB,,, | Performed by: INTERNAL MEDICINE

## 2019-09-05 PROCEDURE — 99999 PR PBB SHADOW E&M-EST. PATIENT-LVL IV: ICD-10-PCS | Mod: PBBFAC,,, | Performed by: INTERNAL MEDICINE

## 2019-09-05 PROCEDURE — 3075F PR MOST RECENT SYSTOLIC BLOOD PRESS GE 130-139MM HG: ICD-10-PCS | Mod: S$GLB,,, | Performed by: INTERNAL MEDICINE

## 2019-09-05 PROCEDURE — 99214 OFFICE O/P EST MOD 30 MIN: CPT | Mod: S$GLB,,, | Performed by: INTERNAL MEDICINE

## 2019-09-05 PROCEDURE — 3079F DIAST BP 80-89 MM HG: CPT | Mod: S$GLB,,, | Performed by: INTERNAL MEDICINE

## 2019-09-05 PROCEDURE — 99214 PR OFFICE/OUTPT VISIT, EST, LEVL IV, 30-39 MIN: ICD-10-PCS | Mod: S$GLB,,, | Performed by: INTERNAL MEDICINE

## 2019-09-05 PROCEDURE — 99999 PR PBB SHADOW E&M-EST. PATIENT-LVL IV: CPT | Mod: PBBFAC,,, | Performed by: INTERNAL MEDICINE

## 2019-09-05 RX ORDER — TRAZODONE HYDROCHLORIDE 50 MG/1
50 TABLET ORAL NIGHTLY
Qty: 30 TABLET | Refills: 2 | Status: SHIPPED | OUTPATIENT
Start: 2019-09-05 | End: 2020-02-11 | Stop reason: SDUPTHER

## 2019-09-05 NOTE — PROGRESS NOTES
SUBJECTIVE:    Patient ID: Jada Fuchs is a 49 y.o. female.    Chief Complaint: Annual Exam and Insomnia    HPI     Insomnia-pt states she had a hysterectomy about 5 years ago-although she may have hot sweats she feels they are not the sole cause of same-she has been taking equate sleep aide and thinks that may have been responsible for the sleep aide-still taking it but cannot sleep without it-She gets to sleep but cannot stay asleep-Awakens after two hours and cannot get back to sleep-never catches up with sleep-states after surgery she got rx but did not want to take a prescription for same-she knows she snores-no apnea noted-no gasping for breath-no headaches-no narcolepsy-no night terrors-mostly sweating may awaken her but not every night-tries to read a book mostly not successful-    Pt notices she has had some thinning of her hair-last time Biotin was recommended-noted no difference-there is some dryness of the skin for which she saw Dermatology-no unusual allergies-no constipation or diarrhea-says pain she had previously was aggravated by food and she has watched her diet to avoid gasey foods.      No visits with results within 6 Month(s) from this visit.   Latest known visit with results is:   Office Visit on 05/22/2018   Component Date Value Ref Range Status    WBC 06/05/2018 4.1* 5.0 - 10.0 K/uL Final    RBC 06/05/2018 4.30  3.50 - 5.50 M/uL Final    Hemoglobin 06/05/2018 12.5  12.0 - 15.0 g/dL Final    Hematocrit 06/05/2018 37.7  36.0 - 48.0 % Final    Mean Corpuscular Volume 06/05/2018 87.7  79.0 - 98.0 fL Final    Mean Corpuscular Hemoglobin 06/05/2018 29.1  25.0 - 35.0 pg Final    Mean Corpuscular Hemoglobin Conc 06/05/2018 33.2  31.0 - 36.0 g/dL Final    RDW 06/05/2018 13.2  11.7 - 14.9 % Final    Platelets 06/05/2018 168  140 - 440 K/uL Final    MPV 06/05/2018 12.7  8.8 - 12.7 fL Final    Gran% 06/05/2018 69.5  % Final    Lymph% 06/05/2018 22.7  % Final    Mono% 06/05/2018 6.2   % Final    Eosinophil% 06/05/2018 1.2  % Final    Basophil% 06/05/2018 0.2  % Final    Gran # (ANC) 06/05/2018 2.8  1.4 - 6.5 K/uL Final    Lymph # 06/05/2018 0.9* 1.2 - 3.4 K/uL Final    Mono # 06/05/2018 0.3  0.1 - 0.6 K/uL Final    Eos # 06/05/2018 0.1  0.0 - 0.7 K/uL Final    Baso # 06/05/2018 0.0  0.0 - 0.2 K/uL Final    Immature Grans (Abs) 06/05/2018 0.0  0.0 - 1.0 K/uL Final    Immature Granulocytes 06/05/2018 0.2  % Final    nRBC% 06/05/2018 0  % Final    Glucose 06/05/2018 77  70 - 99 mg/dL Final    BUN, Bld 06/05/2018 14  8 - 20 mg/dL Final    Creatinine 06/05/2018 0.65  0.60 - 1.40 mg/dL Final    Calcium 06/05/2018 9.2  7.7 - 10.4 mg/dL Final    Sodium 06/05/2018 137  134 - 144 mmol/L Final    Potassium 06/05/2018 4.0  3.5 - 5.0 mmol/L Final    Chloride 06/05/2018 103  98 - 110 mmol/L Final    CO2 06/05/2018 25.3  22.8 - 31.6 mmol/L Final    Albumin 06/05/2018 4.4  3.1 - 4.7 g/dL Final    Total Bilirubin 06/05/2018 0.6  0.3 - 1.0 mg/dL Final    Alkaline Phosphatase 06/05/2018 47  40 - 104 IU/L Final    Total Protein 06/05/2018 7.2  6.0 - 8.2 g/dL Final    ALT (SGPT) 06/05/2018 13  3 - 33 IU/L Final    AST 06/05/2018 21  10 - 40 IU/L Final    TSH 06/05/2018 1.00  0.30 - 5.60 ulU/ml Final       Past Medical History:   Diagnosis Date    Abdominal pain     Anemia     probably secondary to fibroid    Blood transfusion     History of uterine fibroid     Hypothyroidism     Wears glasses     CONTACS     Past Surgical History:   Procedure Laterality Date    BREAST SURGERY      mass removed    HYSTERECTOMY  2015     Family History   Problem Relation Age of Onset    Cancer Maternal Grandfather         prostate    Hypertension Mother     Seizures Maternal Grandmother     Aneurysm Father     Eczema Sister        Marital Status:   Alcohol History:  reports that she drinks alcohol.  Tobacco History:  reports that she has never smoked. She has never used smokeless  tobacco.  Drug History:  reports that she does not use drugs.    Review of patient's allergies indicates:  No Known Allergies    Current Outpatient Medications:     biotin 10,000 mcg Cap, Take 1 capsule by mouth once daily., Disp: , Rfl:     diphenhydramine HCl (UNISOM SLEEPGELS ORAL), Take 1 each by mouth nightly as needed., Disp: , Rfl:     triamcinolone acetonide 0.1% (KENALOG) 0.1 % cream, APPLY A THIN LAYER TWICE A DAY TO LOWER LEGS AND ELBOWS FOR 14 DAYS, Disp: , Rfl: 1    lisinopril 10 MG tablet, Take 1 tablet (10 mg total) by mouth once daily., Disp: 90 tablet, Rfl: 1    traZODone (DESYREL) 50 MG tablet, Take 1 tablet (50 mg total) by mouth every evening., Disp: 30 tablet, Rfl: 2    ZIANA gel, Apply small amount to face qhs, Disp: 60 g, Rfl: 3    Review of Systems   Constitutional: Positive for fatigue (see HPI). Negative for activity change, appetite change, chills, diaphoresis, fever and unexpected weight change.   HENT: Negative for congestion, ear pain, hearing loss, nosebleeds, postnasal drip, rhinorrhea, sinus pressure, sinus pain, sneezing, sore throat, tinnitus, trouble swallowing and voice change.    Eyes: Negative for photophobia, pain, discharge, itching and visual disturbance.   Respiratory: Negative for apnea, cough, chest tightness, shortness of breath, wheezing and stridor.    Cardiovascular: Negative for chest pain, palpitations and leg swelling.        BP rarely elevated-none post Derm visit 4 months ago-   Gastrointestinal: Negative for abdominal distention, abdominal pain, blood in stool, constipation, diarrhea, nausea and vomiting.        HPI   Endocrine: Negative for cold intolerance, heat intolerance, polydipsia and polyuria.   Genitourinary: Negative for difficulty urinating, dyspareunia, dysuria, flank pain, frequency, hematuria, menstrual problem, pelvic pain, urgency, vaginal discharge and vaginal pain.   Musculoskeletal: Negative for arthralgias (occ in fingers ), back pain,  "joint swelling, myalgias, neck pain and neck stiffness.   Skin: Negative for pallor.   Allergic/Immunologic: Negative for environmental allergies and food allergies.   Neurological: Negative for dizziness, tremors, speech difficulty, weakness, light-headedness, numbness (occasionally in feet and anterior lower legs) and headaches.   Hematological: Does not bruise/bleed easily.   Psychiatric/Behavioral: Negative for agitation, confusion, decreased concentration, dysphoric mood, sleep disturbance (HPI) and suicidal ideas. The patient is not nervous/anxious.           Objective:      Vitals:    09/05/19 1407   BP: 136/88   Pulse: 90   Resp: 16   Temp: 98.3 °F (36.8 °C)   SpO2: 98%   Weight: 59.4 kg (131 lb)   Height: 5' 2" (1.575 m)     Physical Exam   Constitutional: She is oriented to person, place, and time. She appears well-developed and well-nourished. She is cooperative. No distress.   HENT:   Head: Normocephalic and atraumatic.   Right Ear: Tympanic membrane normal.   Left Ear: Tympanic membrane normal.   Mouth/Throat: Uvula is midline and mucous membranes are normal.   Eyes: Pupils are equal, round, and reactive to light. Conjunctivae and EOM are normal. Right eye exhibits no discharge. Left eye exhibits no discharge. No scleral icterus. Right pupil is round and reactive. Left pupil is round and reactive.   Neck: Trachea normal and normal range of motion. Neck supple. No JVD present. Carotid bruit is not present. No thyromegaly (the right thyroid nodule seems to be more prominent and is about 1..5 cm -thyromegaly is noted) present.   Cardiovascular: Normal rate, regular rhythm and intact distal pulses. Exam reveals no gallop and no friction rub.   No murmur heard.  Pulmonary/Chest: Effort normal and breath sounds normal. No respiratory distress. She has no wheezes. She has no rales.   Abdominal: Soft. Bowel sounds are normal. She exhibits no distension and no mass. There is no tenderness. There is no guarding. " No hernia.   Musculoskeletal: Normal range of motion. She exhibits no edema.   Neurological: She is alert and oriented to person, place, and time. She has normal strength.   Skin: Skin is warm and dry. Capillary refill takes less than 2 seconds. No lesion and no rash noted. No cyanosis. Nails show no clubbing.   Psychiatric: She has a normal mood and affect. Her speech is normal and behavior is normal. Judgment and thought content normal.   Nursing note and vitals reviewed.        Assessment:       1. Primary insomnia    2. Routine general medical examination at a health care facility    3. Multiple thyroid nodules    4. Other neutropenia    5. Screening breast examination         Plan:       Primary insomnia  -     traZODone (DESYREL) 50 MG tablet; Take 1 tablet (50 mg total) by mouth every evening.  Dispense: 30 tablet; Refill: 2    Routine general medical examination at a health care facility  -     Comprehensive metabolic panel; Future; Expected date: 09/06/2019  -     Lipid panel; Future; Expected date: 09/06/2019  -     Urinalysis Microscopic; Future; Expected date: 09/06/2019    Multiple thyroid nodules  -     US Thyroid; Future; Expected date: 09/05/201  -     TSH; Future; Expected date: 09/05/2019    Other neutropenia  -     CBC auto differential; Future; Expected date: 09/09/2019    Screening breast examination  -     Mammo Digital Screening Bilat with John; Future; Expected date: 09/05/2019      No follow-ups on file.        9/7/2019 Dary Ace M.D.

## 2019-09-09 ENCOUNTER — TELEPHONE (OUTPATIENT)
Dept: DERMATOLOGY | Facility: CLINIC | Age: 49
End: 2019-09-09

## 2019-09-09 NOTE — TELEPHONE ENCOUNTER
----- Message from Nicole Guerra sent at 9/9/2019 11:45 AM CDT -----     Type:  Pharmacy Calling to Clarify an RX    Name of Caller: terrence  Pharmacy Name:    [facility] - [place] - [address]  [address] [place] [address]  Phone: [phone] Fax: [phone]  Prescription Name:  ziana gel  What do they need to clarify?:   Clinical  Notes  Needed to  fill  Best Call Back Number:  [phone]  Additional Information:   Please call for details

## 2019-09-10 ENCOUNTER — TELEPHONE (OUTPATIENT)
Dept: DERMATOLOGY | Facility: CLINIC | Age: 49
End: 2019-09-10

## 2019-09-10 NOTE — TELEPHONE ENCOUNTER
----- Message from Nadia Guardado sent at 9/10/2019 12:43 PM CDT -----  Type: Needs Medical Advice    Who Called:  Patient  Best Call Back Number: [phone]  Additional Information: Patient stated [facility] in [place] needs prior authorization for medication:ZIANA gel/please call patient back to advise.

## 2019-09-12 LAB
ALBUMIN SERPL-MCNC: 4.4 G/DL (ref 3.6–5.1)
ALBUMIN/GLOB SERPL: 1.5 (CALC) (ref 1–2.5)
ALP SERPL-CCNC: 51 U/L (ref 33–115)
ALT SERPL-CCNC: 13 U/L (ref 6–29)
AST SERPL-CCNC: 15 U/L (ref 10–35)
BACTERIA #/AREA URNS HPF: ABNORMAL /HPF
BASOPHILS # BLD AUTO: 22 CELLS/UL (ref 0–200)
BASOPHILS NFR BLD AUTO: 0.4 %
BILIRUB SERPL-MCNC: 0.4 MG/DL (ref 0.2–1.2)
BUN SERPL-MCNC: 12 MG/DL (ref 7–25)
BUN/CREAT SERPL: NORMAL (CALC) (ref 6–22)
CALCIUM SERPL-MCNC: 9.2 MG/DL (ref 8.6–10.2)
CHLORIDE SERPL-SCNC: 106 MMOL/L (ref 98–110)
CHOLEST SERPL-MCNC: 174 MG/DL
CHOLEST/HDLC SERPL: 2.4 (CALC)
CO2 SERPL-SCNC: 26 MMOL/L (ref 20–32)
CREAT SERPL-MCNC: 0.77 MG/DL (ref 0.5–1.1)
EOSINOPHIL # BLD AUTO: 241 CELLS/UL (ref 15–500)
EOSINOPHIL NFR BLD AUTO: 4.3 %
ERYTHROCYTE [DISTWIDTH] IN BLOOD BY AUTOMATED COUNT: 13.1 % (ref 11–15)
GFRSERPLBLD MDRD-ARVRAT: 92 ML/MIN/1.73M2
GLOBULIN SER CALC-MCNC: 2.9 G/DL (CALC) (ref 1.9–3.7)
GLUCOSE SERPL-MCNC: 83 MG/DL (ref 65–99)
HCT VFR BLD AUTO: 38.2 % (ref 35–45)
HDLC SERPL-MCNC: 74 MG/DL
HGB BLD-MCNC: 12.6 G/DL (ref 11.7–15.5)
HYALINE CASTS #/AREA URNS LPF: ABNORMAL /LPF
LDLC SERPL CALC-MCNC: 88 MG/DL (CALC)
LYMPHOCYTES # BLD AUTO: 896 CELLS/UL (ref 850–3900)
LYMPHOCYTES NFR BLD AUTO: 16 %
MCH RBC QN AUTO: 29.4 PG (ref 27–33)
MCHC RBC AUTO-ENTMCNC: 33 G/DL (ref 32–36)
MCV RBC AUTO: 89 FL (ref 80–100)
MONOCYTES # BLD AUTO: 431 CELLS/UL (ref 200–950)
MONOCYTES NFR BLD AUTO: 7.7 %
NEUTROPHILS # BLD AUTO: 4010 CELLS/UL (ref 1500–7800)
NEUTROPHILS NFR BLD AUTO: 71.6 %
NONHDLC SERPL-MCNC: 100 MG/DL (CALC)
PLATELET # BLD AUTO: 179 THOUSAND/UL (ref 140–400)
PMV BLD REES-ECKER: 13.1 FL (ref 7.5–12.5)
POTASSIUM SERPL-SCNC: 4.3 MMOL/L (ref 3.5–5.3)
PROT SERPL-MCNC: 7.3 G/DL (ref 6.1–8.1)
RBC # BLD AUTO: 4.29 MILLION/UL (ref 3.8–5.1)
RBC #/AREA URNS HPF: ABNORMAL /HPF
SODIUM SERPL-SCNC: 138 MMOL/L (ref 135–146)
SQUAMOUS #/AREA URNS HPF: ABNORMAL /HPF
TRIGL SERPL-MCNC: 42 MG/DL
TSH SERPL-ACNC: 2.28 MIU/L
WBC # BLD AUTO: 5.6 THOUSAND/UL (ref 3.8–10.8)
WBC #/AREA URNS HPF: ABNORMAL /HPF

## 2019-09-17 ENCOUNTER — TELEPHONE (OUTPATIENT)
Dept: FAMILY MEDICINE | Facility: CLINIC | Age: 49
End: 2019-09-17

## 2019-09-17 ENCOUNTER — PATIENT MESSAGE (OUTPATIENT)
Dept: FAMILY MEDICINE | Facility: CLINIC | Age: 49
End: 2019-09-17

## 2019-09-18 ENCOUNTER — HOSPITAL ENCOUNTER (OUTPATIENT)
Dept: RADIOLOGY | Facility: HOSPITAL | Age: 49
Discharge: HOME | End: 2019-09-18
Attending: INTERNAL MEDICINE
Payer: COMMERCIAL

## 2019-09-18 VITALS — WEIGHT: 131 LBS | BODY MASS INDEX: 24.11 KG/M2 | HEIGHT: 62 IN

## 2019-09-18 DIAGNOSIS — Z12.39 SCREENING BREAST EXAMINATION: ICD-10-CM

## 2019-09-18 DIAGNOSIS — E04.2 MULTIPLE THYROID NODULES: ICD-10-CM

## 2019-09-18 PROCEDURE — 77067 SCR MAMMO BI INCL CAD: CPT | Mod: TC,PO

## 2019-09-18 PROCEDURE — 76536 US EXAM OF HEAD AND NECK: CPT | Mod: TC,PO

## 2019-09-18 NOTE — PROGRESS NOTES
See above report-any nodule over 1 cm should be evaluated for bx or removal-does she have a surgical choice to see what they recommend?

## 2019-09-19 DIAGNOSIS — I10 HTN (HYPERTENSION), BENIGN: ICD-10-CM

## 2019-09-19 RX ORDER — LISINOPRIL 10 MG/1
10 TABLET ORAL DAILY
Qty: 90 TABLET | Refills: 1 | Status: SHIPPED | OUTPATIENT
Start: 2019-09-19 | End: 2020-12-02

## 2019-09-19 NOTE — TELEPHONE ENCOUNTER
Pt states BP med was not discussed in ov. Did you want her to continue that one? If so, she is out of med. RX pended below for refill. thx

## 2020-01-08 ENCOUNTER — TELEPHONE (OUTPATIENT)
Dept: DERMATOLOGY | Facility: CLINIC | Age: 50
End: 2020-01-08

## 2020-01-08 ENCOUNTER — OFFICE VISIT (OUTPATIENT)
Dept: DERMATOLOGY | Facility: CLINIC | Age: 50
End: 2020-01-08
Payer: COMMERCIAL

## 2020-01-08 VITALS — BODY MASS INDEX: 24.11 KG/M2 | WEIGHT: 131 LBS | HEIGHT: 62 IN

## 2020-01-08 DIAGNOSIS — L81.9 POSTINFLAMMATORY PIGMENTARY CHANGES: ICD-10-CM

## 2020-01-08 DIAGNOSIS — L73.8 FOLLICULAR ECZEMA: ICD-10-CM

## 2020-01-08 DIAGNOSIS — L70.0 ACNE VULGARIS: ICD-10-CM

## 2020-01-08 DIAGNOSIS — L65.9 HAIR LOSS: ICD-10-CM

## 2020-01-08 DIAGNOSIS — L81.9 POSTINFLAMMATORY PIGMENTARY CHANGES: Primary | ICD-10-CM

## 2020-01-08 PROCEDURE — 3008F BODY MASS INDEX DOCD: CPT | Mod: CPTII,S$GLB,, | Performed by: DERMATOLOGY

## 2020-01-08 PROCEDURE — 99214 OFFICE O/P EST MOD 30 MIN: CPT | Mod: S$GLB,,, | Performed by: DERMATOLOGY

## 2020-01-08 PROCEDURE — 99214 PR OFFICE/OUTPT VISIT, EST, LEVL IV, 30-39 MIN: ICD-10-PCS | Mod: S$GLB,,, | Performed by: DERMATOLOGY

## 2020-01-08 PROCEDURE — 3008F PR BODY MASS INDEX (BMI) DOCUMENTED: ICD-10-PCS | Mod: CPTII,S$GLB,, | Performed by: DERMATOLOGY

## 2020-01-08 PROCEDURE — 99999 PR PBB SHADOW E&M-EST. PATIENT-LVL II: ICD-10-PCS | Mod: PBBFAC,,, | Performed by: DERMATOLOGY

## 2020-01-08 PROCEDURE — 99999 PR PBB SHADOW E&M-EST. PATIENT-LVL II: CPT | Mod: PBBFAC,,, | Performed by: DERMATOLOGY

## 2020-01-08 RX ORDER — HYDROQUINONE 40 MG/G
CREAM TOPICAL
Qty: 30 G | Refills: 1 | Status: SHIPPED | OUTPATIENT
Start: 2020-01-08 | End: 2021-11-11

## 2020-01-08 RX ORDER — LATANOPROST 50 UG/ML
SOLUTION/ DROPS OPHTHALMIC
Refills: 3 | COMMUNITY
Start: 2019-10-04 | End: 2020-02-19

## 2020-01-08 RX ORDER — HYDROQUINONE 40 MG/G
CREAM TOPICAL
Qty: 30 G | Refills: 1 | Status: SHIPPED | OUTPATIENT
Start: 2020-01-08 | End: 2020-01-08 | Stop reason: SDUPTHER

## 2020-01-08 NOTE — TELEPHONE ENCOUNTER
Patient was seen today,  Refill request from pharmacy received.    Hydroquinone 4%    Please advise

## 2020-01-08 NOTE — TELEPHONE ENCOUNTER
----- Message from Jason Lim sent at 1/8/2020  3:03 PM CST -----  Contact: Patient  Patient called in to speak with someone at the office regarding a prescription and would like a call back from the office. The patient can be reached at    [phone]

## 2020-01-08 NOTE — PROGRESS NOTES
Subjective:       Patient ID:  Jada Fuchs is a 49 y.o. female who presents for   Chief Complaint   Patient presents with    Acne     F/u, imprved with Ziana gel QHS    Eczema     F/u follicular eczema, managing with TAC    Hair Loss     Scalp, worse over past year, shedding and thinning, tx with Pantene shampoo     Pt last seen 9-4-19 for follicular eczema and acne, rx'd Ziana and TAC.    Here today for f/u.   Pt has been using Ziana QHS, with some improvement. Still with occasional breakouts  Concerns for scarring.  Follicular eczema on legs has improved with TAC BID.  Feels has improved more so in past month.    Also c/o hair thinning in past year.  States hair has shed more easily and thinned out.  Tx with Pantene Pro V shampoo.  Gets relaxers done every 3 months.  Denies Fhx hair loss  TSH WNL 9/2019    Hysterectomy 8 years ago.   No significant xerosis with ziana  Using compounded Rx with RA, HQ and fluocinonide    Denies family history BRCA  Mom with thinning    Review of Systems   Constitutional: Negative for fever, chills and fatigue.   Skin: Positive for daily sunscreen use, activity-related sunscreen use and wears hat.   Hematologic/Lymphatic: Does not bruise/bleed easily.        Objective:    Physical Exam   Constitutional: She appears well-developed and well-nourished. No distress.   HENT:   Mouth/Throat: Lips normal.    Eyes: Lids are normal.  No conjunctival no injection.   Cardiovascular: There is no local extremity swelling and no dependent edema.     Neurological: She is alert and oriented to person, place, and time. She is not disoriented.   Psychiatric: She has a normal mood and affect.   Skin:   Areas Examined (abnormalities noted in diagram):   Scalp / Hair Palpated and Inspected  Head / Face Inspection Performed  Neck Inspection Performed  Chest / Axilla Inspection Performed  Back Inspection Performed  RUE Inspected  LUE Inspection Performed  Nails and Digits Inspection  Performed              Diagram Legend     Erythematous scaling macule/papule c/w actinic keratosis       Vascular papule c/w angioma      Pigmented verrucoid papule/plaque c/w seborrheic keratosis      Yellow umbilicated papule c/w sebaceous hyperplasia      Irregularly shaped tan macule c/w lentigo     1-2 mm smooth white papules consistent with Milia      Movable subcutaneous cyst with punctum c/w epidermal inclusion cyst      Subcutaneous movable cyst c/w pilar cyst      Firm pink to brown papule c/w dermatofibroma      Pedunculated fleshy papule(s) c/w skin tag(s)      Evenly pigmented macule c/w junctional nevus     Mildly variegated pigmented, slightly irregular-bordered macule c/w mildly atypical nevus      Flesh colored to evenly pigmented papule c/w intradermal nevus       Pink pearly papule/plaque c/w basal cell carcinoma      Erythematous hyperkeratotic cursted plaque c/w SCC      Surgical scar with no sign of skin cancer recurrence      Open and closed comedones      Inflammatory papules and pustules      Verrucoid papule consistent consistent with wart     Erythematous eczematous patches and plaques     Dystrophic onycholytic nail with subungual debris c/w onychomycosis     Umbilicated papule    Erythematous-base heme-crusted tan verrucoid plaque consistent with inflamed seborrheic keratosis     Erythematous Silvery Scaling Plaque c/w Psoriasis     See annotation      Assessment / Plan:        Postinflammatory pigmentary changes  Continue diligent sun precautions  Consider revision vit c in future, declines today    -     hydroquinone 4 % Crea; aaa dark spots twice a day prn, avoid use more than 3 months  Dispense: 30 g; Refill: 1    Hair loss  tsh wnl  Suspect female pattern +/- CCCA- recommend natural hairstyles  viviscal daily   Consider finasteride in future, declines today  -     VITAMIN D; Future; Expected date: 01/08/2020    Acne vulgaris  Improved  Continue ziana  neutrogena hydroboost  qhs      Follicular eczema  Much improved   Ok to d/c TAC         Follow up if symptoms worsen or fail to improve.

## 2020-02-11 DIAGNOSIS — F51.01 PRIMARY INSOMNIA: ICD-10-CM

## 2020-02-11 RX ORDER — TRAZODONE HYDROCHLORIDE 50 MG/1
50 TABLET ORAL NIGHTLY
Qty: 30 TABLET | Refills: 2 | Status: SHIPPED | OUTPATIENT
Start: 2020-02-11 | End: 2020-05-18

## 2020-02-14 ENCOUNTER — CLINICAL SUPPORT (OUTPATIENT)
Dept: URGENT CARE | Facility: CLINIC | Age: 50
End: 2020-02-14
Payer: COMMERCIAL

## 2020-02-14 VITALS
HEIGHT: 59 IN | WEIGHT: 131.63 LBS | HEART RATE: 96 BPM | DIASTOLIC BLOOD PRESSURE: 98 MMHG | SYSTOLIC BLOOD PRESSURE: 171 MMHG | OXYGEN SATURATION: 98 % | BODY MASS INDEX: 26.54 KG/M2 | TEMPERATURE: 102 F

## 2020-02-14 DIAGNOSIS — J10.1 INFLUENZA A: Primary | ICD-10-CM

## 2020-02-14 DIAGNOSIS — J06.9 VIRAL URI WITH COUGH: ICD-10-CM

## 2020-02-14 PROCEDURE — 99204 OFFICE O/P NEW MOD 45 MIN: CPT | Mod: 25,S$GLB,, | Performed by: NURSE PRACTITIONER

## 2020-02-14 PROCEDURE — 99204 PR OFFICE/OUTPT VISIT, NEW, LEVL IV, 45-59 MIN: ICD-10-PCS | Mod: 25,S$GLB,, | Performed by: NURSE PRACTITIONER

## 2020-02-14 RX ORDER — ACETAMINOPHEN 500 MG
1000 TABLET ORAL
Status: COMPLETED | OUTPATIENT
Start: 2020-02-14 | End: 2020-02-14

## 2020-02-14 RX ADMIN — Medication 1000 MG: at 05:02

## 2020-02-14 NOTE — PATIENT INSTRUCTIONS

## 2020-02-14 NOTE — PROGRESS NOTES
"Subjective:       Patient ID: Jada Fuchs is a 49 y.o. female.    Vitals:  height is 4' 11" (1.499 m) and weight is 59.7 kg (131 lb 9.6 oz). Her oral temperature is 102.2 °F (39 °C) (abnormal). Her blood pressure is 171/98 (abnormal) and her pulse is 96. Her oxygen saturation is 98%.     Chief Complaint: Cough    Pt presents with fever, congestion and cough since last night. Pt denies sob or cp. Pt denies n/v/d.     Cough   This is a new problem. The problem has been gradually worsening. The cough is non-productive. Associated symptoms include chills, a fever, headaches, myalgias, postnasal drip, rhinorrhea and sweats. Pertinent negatives include no ear pain, eye redness, hemoptysis, rash, sore throat, shortness of breath or wheezing. Treatments tried: Mucinex, Delsym. The treatment provided no relief.       Constitution: Positive for chills, fatigue and fever. Negative for sweating.   HENT: Positive for postnasal drip. Negative for ear pain, congestion, sinus pain, sinus pressure, sore throat and voice change.    Neck: Negative for painful lymph nodes.   Eyes: Negative for eye redness.   Respiratory: Positive for cough. Negative for chest tightness, sputum production, bloody sputum, COPD, shortness of breath, stridor, wheezing and asthma.    Gastrointestinal: Negative for nausea and vomiting.   Musculoskeletal: Positive for muscle ache.   Skin: Negative for rash.   Allergic/Immunologic: Negative for seasonal allergies and asthma.   Neurological: Positive for headaches.   Hematologic/Lymphatic: Negative for swollen lymph nodes.       Objective:      Physical Exam   Constitutional: She is oriented to person, place, and time. She appears well-developed and well-nourished. She is cooperative.  Non-toxic appearance. She does not have a sickly appearance. She does not appear ill. No distress.   HENT:   Head: Normocephalic and atraumatic.   Right Ear: Hearing, tympanic membrane, external ear and ear canal normal. "   Left Ear: Hearing, tympanic membrane, external ear and ear canal normal.   Nose: Mucosal edema and rhinorrhea present. No nasal deformity. No epistaxis. Right sinus exhibits no maxillary sinus tenderness and no frontal sinus tenderness. Left sinus exhibits no maxillary sinus tenderness and no frontal sinus tenderness.   Mouth/Throat: Uvula is midline, oropharynx is clear and moist and mucous membranes are normal. No trismus in the jaw. Normal dentition. No uvula swelling. No oropharyngeal exudate, posterior oropharyngeal edema or posterior oropharyngeal erythema.   Eyes: Pupils are equal, round, and reactive to light. Conjunctivae, EOM and lids are normal. No scleral icterus.   Neck: Trachea normal, normal range of motion, full passive range of motion without pain and phonation normal. Neck supple. No neck rigidity. No edema and no erythema present.   Cardiovascular: Normal rate, regular rhythm, normal heart sounds, intact distal pulses and normal pulses. Exam reveals no friction rub.   No murmur heard.  Pulmonary/Chest: Effort normal and breath sounds normal. No stridor. No respiratory distress. She has no decreased breath sounds. She has no wheezes. She has no rhonchi. She has no rales. She exhibits no tenderness.   Abdominal: Normal appearance.   Musculoskeletal: Normal range of motion. She exhibits no edema or deformity.   Lymphadenopathy:     She has no cervical adenopathy.   Neurological: She is alert and oriented to person, place, and time. She exhibits normal muscle tone. Coordination normal.   Skin: Skin is warm, dry, intact, not diaphoretic and not pale.   Psychiatric: She has a normal mood and affect. Her speech is normal and behavior is normal. Judgment and thought content normal. Cognition and memory are normal.   Nursing note and vitals reviewed.        Assessment:       1. Influenza A    2. Viral URI with cough        Plan:         Influenza A    Viral URI with cough    Other orders  -      acetaminophen tablet 1,000 mg  -     baloxavir marboxiL (XOFLUZA) 20 mg tablet; Take 2 tablets (40 mg total) by mouth once. for 1 dose  Dispense: 2 tablet; Refill: 0       advised on OTC meds for symptom management. RTC if symptoms worsen or not improving.

## 2020-04-21 DIAGNOSIS — Z01.84 ANTIBODY RESPONSE EXAMINATION: ICD-10-CM

## 2020-05-12 DIAGNOSIS — F51.01 PRIMARY INSOMNIA: ICD-10-CM

## 2020-05-14 DIAGNOSIS — F51.01 PRIMARY INSOMNIA: ICD-10-CM

## 2020-05-14 RX ORDER — TRAZODONE HYDROCHLORIDE 50 MG/1
50 TABLET ORAL NIGHTLY
Qty: 30 TABLET | Refills: 2 | OUTPATIENT
Start: 2020-05-14 | End: 2021-05-14

## 2020-05-18 RX ORDER — TRAZODONE HYDROCHLORIDE 50 MG/1
TABLET ORAL
Qty: 30 TABLET | Refills: 0 | Status: SHIPPED | OUTPATIENT
Start: 2020-05-18 | End: 2020-05-28 | Stop reason: SDUPTHER

## 2020-05-21 DIAGNOSIS — Z01.84 ANTIBODY RESPONSE EXAMINATION: ICD-10-CM

## 2020-05-28 DIAGNOSIS — F51.01 PRIMARY INSOMNIA: ICD-10-CM

## 2020-05-28 RX ORDER — TRAZODONE HYDROCHLORIDE 50 MG/1
50 TABLET ORAL NIGHTLY
Qty: 30 TABLET | Refills: 0 | Status: SHIPPED | OUTPATIENT
Start: 2020-05-28 | End: 2020-06-02 | Stop reason: SDUPTHER

## 2020-05-31 NOTE — PROGRESS NOTES
SUBJECTIVE:    Patient ID: Jada Fuchs is a 50 y.o. female.    Chief Complaint: Follow-up (htn and sleep)    HPI        Jada Fuchs is here for follow-up of her hypertension- Home blood pressure readings: usual 140/80-not taking lisinopril because it made her feel bad'. Salt intake and diet: salt not added to cooking and caffeine intake: . Usual weight: stable. Associated signs and symptoms: none. Patient denies: blurred vision, chest pain, dyspnea, headache, neck aches, orthopnea, palpitations, paroxysmal nocturnal dyspnea, peripheral edema, pulsating in the ears and tiredness/fatigue. Use of agents associated with hypertension: none. Medication compliance: not taking lisinopril as prescribed.    Insomnia-she says she is sleeping very well -no anxiety no depression-taking trazadone only      No visits with results within 6 Month(s) from this visit.   Latest known visit with results is:   Office Visit on 09/05/2019   Component Date Value Ref Range Status    Glucose 09/11/2019 83  65 - 99 mg/dL Final    BUN, Bld 09/11/2019 12  7 - 25 mg/dL Final    Creatinine 09/11/2019 0.77  0.50 - 1.10 mg/dL Final    eGFR if non African American 09/11/2019 92  > OR = 60 mL/min/1.73m2 Final    eGFR if African American 09/11/2019 107  > OR = 60 mL/min/1.73m2 Final    BUN/Creatinine Ratio 09/11/2019 NOT APPLICABLE  6 - 22 (calc) Final    Sodium 09/11/2019 138  135 - 146 mmol/L Final    Potassium 09/11/2019 4.3  3.5 - 5.3 mmol/L Final    Chloride 09/11/2019 106  98 - 110 mmol/L Final    CO2 09/11/2019 26  20 - 32 mmol/L Final    Calcium 09/11/2019 9.2  8.6 - 10.2 mg/dL Final    Total Protein 09/11/2019 7.3  6.1 - 8.1 g/dL Final    Albumin 09/11/2019 4.4  3.6 - 5.1 g/dL Final    Globulin, Total 09/11/2019 2.9  1.9 - 3.7 g/dL (calc) Final    Albumin/Globulin Ratio 09/11/2019 1.5  1.0 - 2.5 (calc) Final    Total Bilirubin 09/11/2019 0.4  0.2 - 1.2 mg/dL Final    Alkaline Phosphatase 09/11/2019 51  33  115  U/L Final    AST 09/11/2019 15  10 - 35 U/L Final    ALT 09/11/2019 13  6 - 29 U/L Final    Cholesterol 09/11/2019 174  <200 mg/dL Final    HDL 09/11/2019 74  >50 mg/dL Final    Triglycerides 09/11/2019 42  <150 mg/dL Final    LDL Cholesterol 09/11/2019 88  mg/dL (calc) Final    Hdl/Cholesterol Ratio 09/11/2019 2.4  <5.0 (calc) Final    Non HDL Chol. (LDL+VLDL) 09/11/2019 100  <130 mg/dL (calc) Final    WBC Casts, UA 09/11/2019 0-5  < OR = 5 /HPF Final    RBC Casts, UA 09/11/2019 NONE SEEN  < OR = 2 /HPF Final    Squam Epithel, UA 09/11/2019 6-10* < OR = 5 /HPF Final    Bacteria, UA 09/11/2019 NONE SEEN  NONE SEEN /HPF Final    Hyaline Casts, UA 09/11/2019 NONE SEEN  NONE SEEN /LPF Final    WBC 09/11/2019 5.6  3.8 - 10.8 Thousand/uL Final    RBC 09/11/2019 4.29  3.80 - 5.10 Million/uL Final    Hemoglobin 09/11/2019 12.6  11.7 - 15.5 g/dL Final    Hematocrit 09/11/2019 38.2  35.0 - 45.0 % Final    Mean Corpuscular Volume 09/11/2019 89.0  80.0 - 100.0 fL Final    Mean Corpuscular Hemoglobin 09/11/2019 29.4  27.0 - 33.0 pg Final    Mean Corpuscular Hemoglobin Conc 09/11/2019 33.0  32.0 - 36.0 g/dL Final    RDW 09/11/2019 13.1  11.0 - 15.0 % Final    Platelets 09/11/2019 179  140 - 400 Thousand/uL Final    MPV 09/11/2019 13.1* 7.5 - 12.5 fL Final    Neutrophils Absolute 09/11/2019 4,010  1,500 - 7,800 cells/uL Final    Lymph # 09/11/2019 896  850 - 3,900 cells/uL Final    Mono # 09/11/2019 431  200 - 950 cells/uL Final    Eos # 09/11/2019 241  15 - 500 cells/uL Final    Baso # 09/11/2019 22  0 - 200 cells/uL Final    Neutrophils Relative 09/11/2019 71.6  % Final    Lymph% 09/11/2019 16.0  % Final    Mono% 09/11/2019 7.7  % Final    Eosinophil% 09/11/2019 4.3  % Final    Basophil% 09/11/2019 0.4  % Final    TSH 09/11/2019 2.28  mIU/L Final       Past Medical History:   Diagnosis Date    Abdominal pain     Anemia     probably secondary to fibroid    Blood transfusion     History of  uterine fibroid     Hypothyroidism     Wears glasses     CONTACS     Past Surgical History:   Procedure Laterality Date    BREAST SURGERY      mass removed    HYSTERECTOMY  2015     Family History   Problem Relation Age of Onset    Cancer Maternal Grandfather         prostate    Hypertension Mother     Seizures Maternal Grandmother     Aneurysm Father     Eczema Sister     Melanoma Neg Hx        Marital Status:   Alcohol History:  reports that she drinks alcohol.  Tobacco History:  reports that she has never smoked. She has never used smokeless tobacco.  Drug History:  reports that she does not use drugs.    Review of patient's allergies indicates:  No Known Allergies    Current Outpatient Medications:     hydroquinone 4 % Crea, aaa dark spots twice a day prn, avoid use more than 3 months, Disp: 30 g, Rfl: 1    traZODone (DESYREL) 50 MG tablet, Take 1 tablet (50 mg total) by mouth every evening., Disp: 90 tablet, Rfl: 1    ZIANA gel, Apply small amount to face qhs, Disp: 60 g, Rfl: 3    diphenhydramine HCl (UNISOM SLEEPGELS ORAL), Take 1 each by mouth nightly as needed., Disp: , Rfl:     lisinopril 10 MG tablet, Take 1 tablet (10 mg total) by mouth once daily. (Patient not taking: Reported on 6/2/2020), Disp: 90 tablet, Rfl: 1    triamcinolone acetonide 0.1% (KENALOG) 0.1 % cream, APPLY A THIN LAYER TWICE A DAY TO LOWER LEGS AND ELBOWS FOR 14 DAYS, Disp: , Rfl: 1    Review of Systems   Constitutional: Negative for appetite change, chills, fatigue, fever and unexpected weight change.   HENT: Negative for congestion, ear pain, hearing loss, sinus pain and sore throat.    Eyes: Negative for pain and visual disturbance.   Respiratory: Negative for cough, shortness of breath and wheezing.    Cardiovascular: Negative for chest pain, palpitations and leg swelling.   Gastrointestinal: Negative for abdominal pain, blood in stool, constipation, diarrhea, nausea and vomiting.   Endocrine: Negative for  "cold intolerance and heat intolerance.   Genitourinary: Negative for difficulty urinating, dysuria, frequency, pelvic pain and urgency.   Musculoskeletal: Negative for back pain, joint swelling and neck pain.   Skin: Negative for pallor and rash.   Neurological: Negative for dizziness, tremors, weakness, numbness and headaches.   Hematological: Does not bruise/bleed easily.   Psychiatric/Behavioral: Negative for agitation, sleep disturbance and suicidal ideas.          Objective:      Vitals:    06/02/20 1748   BP: 120/74   Pulse: 76   Temp: 98.4 °F (36.9 °C)   SpO2: 98%   Weight: 58 kg (127 lb 14.4 oz)   Height: 4' 11" (1.499 m)     Physical Exam   Constitutional: She is oriented to person, place, and time. She appears well-developed and well-nourished. She is cooperative.   Eyes: Conjunctivae, EOM and lids are normal. Right pupil is round and reactive. Left pupil is round and reactive.   Neck: Trachea normal and normal range of motion. Neck supple. No JVD present. Carotid bruit is not present. No thyromegaly present.   Cardiovascular: Normal rate, regular rhythm, S1 normal, S2 normal, normal heart sounds and intact distal pulses. Exam reveals no gallop and no friction rub.   No murmur heard.  Pulmonary/Chest: Breath sounds normal. No respiratory distress. She has no wheezes. She has no rales.   Abdominal: Soft. Bowel sounds are normal. She exhibits no mass. There is no tenderness. There is no rigidity and no guarding.   Musculoskeletal: She exhibits no edema.   Neurological: She is alert and oriented to person, place, and time.   Skin: Skin is warm and dry. Capillary refill takes less than 2 seconds. No lesion and no rash noted. Nails show no clubbing.   Psychiatric: She has a normal mood and affect. Her behavior is normal. Judgment and thought content normal.   Nursing note and vitals reviewed.        Assessment:       1. Multinodular thyroid    2. Other neutropenia    3. Routine general medical examination at a " health care facility    4. Essential hypertension, benign    5. Primary insomnia         Plan:       Multinodular thyroid  -     US Soft Tissue Head Neck Thyroid; Future; Expected date: 09/30/2020  -     TSH; Future; Expected date: 09/30/2020    Other neutropenia  -     CBC auto differential; Future; Expected date: 08/31/2020    Routine general medical examination at a health care facility  -     Comprehensive metabolic panel; Future; Expected date: 09/30/2020  -     Lipid Panel; Future; Expected date: 09/30/2020    Essential hypertension, benign--RESOLVED FOR NOW  -     CBC auto differential; Future; Expected date: 08/31/2020  -     Comprehensive metabolic panel; Future; Expected date: 09/30/2020  -     MONITOR BP FOR NEED TO START NON ace CLASS AGENT    Primary insomnia  -     traZODone (DESYREL) 50 MG tablet; Take 1 tablet (50 mg total) by mouth every evening.  Dispense: 90 tablet; Refill: 1      No follow-ups on file.        6/2/2020 Dary Ace M.D.

## 2020-06-02 ENCOUNTER — OFFICE VISIT (OUTPATIENT)
Dept: FAMILY MEDICINE | Facility: CLINIC | Age: 50
End: 2020-06-02
Payer: COMMERCIAL

## 2020-06-02 VITALS
BODY MASS INDEX: 25.78 KG/M2 | OXYGEN SATURATION: 98 % | DIASTOLIC BLOOD PRESSURE: 74 MMHG | TEMPERATURE: 98 F | WEIGHT: 127.88 LBS | HEART RATE: 76 BPM | HEIGHT: 59 IN | SYSTOLIC BLOOD PRESSURE: 120 MMHG

## 2020-06-02 DIAGNOSIS — F41.8 ANXIETY WITH DEPRESSION: ICD-10-CM

## 2020-06-02 DIAGNOSIS — D70.8 OTHER NEUTROPENIA: ICD-10-CM

## 2020-06-02 DIAGNOSIS — I10 ESSENTIAL HYPERTENSION, BENIGN: Primary | ICD-10-CM

## 2020-06-02 DIAGNOSIS — E04.2 MULTINODULAR THYROID: ICD-10-CM

## 2020-06-02 DIAGNOSIS — Z00.00 ROUTINE GENERAL MEDICAL EXAMINATION AT A HEALTH CARE FACILITY: ICD-10-CM

## 2020-06-02 DIAGNOSIS — F51.01 PRIMARY INSOMNIA: ICD-10-CM

## 2020-06-02 PROCEDURE — 99213 OFFICE O/P EST LOW 20 MIN: CPT | Mod: S$GLB,,, | Performed by: INTERNAL MEDICINE

## 2020-06-02 PROCEDURE — 3078F PR MOST RECENT DIASTOLIC BLOOD PRESSURE < 80 MM HG: ICD-10-PCS | Mod: S$GLB,,, | Performed by: INTERNAL MEDICINE

## 2020-06-02 PROCEDURE — 3074F PR MOST RECENT SYSTOLIC BLOOD PRESSURE < 130 MM HG: ICD-10-PCS | Mod: S$GLB,,, | Performed by: INTERNAL MEDICINE

## 2020-06-02 PROCEDURE — 3078F DIAST BP <80 MM HG: CPT | Mod: S$GLB,,, | Performed by: INTERNAL MEDICINE

## 2020-06-02 PROCEDURE — 3008F PR BODY MASS INDEX (BMI) DOCUMENTED: ICD-10-PCS | Mod: S$GLB,,, | Performed by: INTERNAL MEDICINE

## 2020-06-02 PROCEDURE — 3008F BODY MASS INDEX DOCD: CPT | Mod: S$GLB,,, | Performed by: INTERNAL MEDICINE

## 2020-06-02 PROCEDURE — 3074F SYST BP LT 130 MM HG: CPT | Mod: S$GLB,,, | Performed by: INTERNAL MEDICINE

## 2020-06-02 PROCEDURE — 99213 PR OFFICE/OUTPT VISIT, EST, LEVL III, 20-29 MIN: ICD-10-PCS | Mod: S$GLB,,, | Performed by: INTERNAL MEDICINE

## 2020-06-02 RX ORDER — TRAZODONE HYDROCHLORIDE 50 MG/1
50 TABLET ORAL NIGHTLY
Qty: 90 TABLET | Refills: 1 | Status: SHIPPED | OUTPATIENT
Start: 2020-06-02 | End: 2020-12-02

## 2020-06-20 DIAGNOSIS — Z01.84 ANTIBODY RESPONSE EXAMINATION: ICD-10-CM

## 2020-06-22 ENCOUNTER — TELEPHONE (OUTPATIENT)
Dept: FAMILY MEDICINE | Facility: CLINIC | Age: 50
End: 2020-06-22

## 2020-07-20 DIAGNOSIS — Z01.84 ANTIBODY RESPONSE EXAMINATION: ICD-10-CM

## 2020-08-04 NOTE — PATIENT INSTRUCTIONS
Ovarian Cysts    Ovarian cysts are sacs filled with fluid or tissue that form on or inside the ovaries. The ovaries are two small organs located on each side of a womans uterus (womb). They are part of the female reproductive system.  Ovarian cysts are common in women, especially during childbearing years. There are different types of cysts. Most are harmless (benign) and go away on their own. They often cause no symptoms. If symptoms do occur, they can include mild pain or pressure in the lower belly (abdomen).  Cysts that are large or break (rupture) may cause more severe pain and symptoms. In these cases, hospital care or treatment such as surgery may be needed. More extensive treatment may also be needed if a cyst causes an ovary to twist (called torsion) or if a cyst is suspected to be cancerous. Keep in mind that most cysts are not cancerous, however.  General care  · To help relieve pain, your healthcare provider may recommend using over-the-counter pain medicine. If needed, stronger pain medicine may be prescribed.  · Depending on the type of cyst you have, your healthcare provider may advise taking birth control pills. These help shrink cysts in certain cases. They may also help prevent new cysts from forming. Be sure to take these medicines as directed if they are prescribed.  · Your healthcare provider may advise you to watch your symptoms over time to see if they go away or worsen. Regular ultrasound tests may also be advised. These can help check if a cyst goes away or grows in size.  Follow-up care  Follow up with your healthcare provider, or as advised.  When to seek medical advice  Call your healthcare provider right away if any of these occur:  · Pain worsens or fails to get better with home treatment  · Fever of 100.4°F (38°C) or higher (or other fever amount directed by your healthcare provider)  · Nausea and vomiting  · Weakness, dizziness, or fainting  · Abnormal vaginal bleeding  Date Last  PROCEDURE:  XR CHEST 2V  
   
INDICATIONS:  Shortness of breath  
   
TECHNIQUE:  2 views of the chest were acquired.    
   
COMPARISON:  None.  
   
FINDINGS:    
   
Surgical changes and devices:  None.    
   
Lungs and pleura:  Lungs demonstrate no focal consolidation or obviously abnormal   
interstitial markings.  No radiographic findings of pulmonary edema.  No pleural   
effusions or pneumothorax.    
   
Mediastinum:  Mediastinal contours are normal.  Heart size is normal.    
   
Bones and chest wall:  No suspicious bony abnormalities.  Soft tissues appear   
unremarkable.    
   
IMPRESSION:  No acute cardiopulmonary process demonstrated radiographically.  
   
   
Dictated by: Deandre Sahu M.D. on 8/04/2020 at 16:36       
Approved by: Deandre Sahu M.D. on 8/04/2020 at 16:37 Reviewed: 6/11/2015  © 0715-8502 [employer]. [address] [place] [address], [place] [address]. All rights reserved. This information is not intended as a substitute for professional medical care. Always follow your healthcare professional's instructions.        Treatment for Ovarian Cysts  An ovarian cyst is a fluid-filled sac that forms on or inside an ovary. The ovaries are a pair of small, oval-shaped organs in the lower part of a womans belly (abdomen). About once a month, one of the ovaries releases an egg. The ovaries also make the hormones estrogen and progesterone. These hormones are part of pregnancy, the menstrual cycle, and breast growth.  Ovarian cysts are very common in women of all ages. Young girls can also get them, but this is less common. There are different types of ovarian cysts. They can occur for various reasons, and they may need different treatments. A cyst can vary in size from half an inch to more than 4 inches.  Types of treatment  Treatment for an ovarian cyst will depend on the type of cyst, your age, and your general health. Most women will not need treatment. You may be told to watch your symptoms over time. An ovarian cyst will often go away with no treatment in a few weeks or months.  In some cases, you may need to have follow-up ultrasound tests. These are to check if your cyst has gone away or is not growing. You may not need any other treatment.  If your ultrasound or blood tests show signs of cancer, your doctor may advise surgery. This is done to remove part or all of your ovary. Your doctor might also advise surgery if:  · Your cyst causes ongoing pressure or pain  · Your cyst appears to be growing  · You have a very large cyst  · You have endometriosis and want the cyst removed to help with fertility  Can an ovarian cyst be prevented?  If you have hormone problems, your doctor may advise taking birth control pills. These may help prevent ovarian cysts. Taking  antibiotics for a pelvic infection may also prevent a cyst.  Possible complications of an ovarian cyst  An ovarian cyst can sometimes break open (rupture). This may not cause any symptoms. Or it may cause sudden, sharp pain in the lower belly. A ruptured cyst can cause a lot of blood and fluid loss. This can lead to low blood pressure. In some cases, surgery may be needed.  Rarely an ovarian cyst can also cause twisting (torsion) of the fallopian tube. This can block normal blood supply to the ovary. This can lead to sudden pain and may need emergency surgery.  When to call the healthcare provider  Call your healthcare provider right away if you have any of these:  · Sudden belly pain  · Other severe symptoms   Date Last Reviewed: 8/10/2015  © 8194-9423 [employer]. [address] [place] [address], [place] [address]. All rights reserved. This information is not intended as a substitute for professional medical care. Always follow your healthcare professional's instructions.        Understanding Ovarian Cysts  An ovarian cyst is a fluid-filled sac that forms on or inside an ovary. The ovaries are a pair of small, oval-shaped organs in the lower part of a womans belly (abdomen). About once a month, one of the ovaries releases an egg. The ovaries also make the hormones estrogen and progesterone. These hormones are part of pregnancy, the menstrual cycle, and breast growth.  Ovarian cysts are very common in women of all ages. Young girls can also get them, but this is less common. There are different types of ovarian cysts. They can occur for various reasons, and they may need different treatments. A cyst can vary in size from half an inch to more than 4 inches.  Types of ovarian cysts  There are different types of ovarian cysts:  Functional cyst  This is the most common type of ovarian cyst. They only occur in women who havent gone through menopause. There are 2 types of functional cysts:  · Follicular cyst. This  cyst happens when an egg isnt released and it keeps growing inside the ovary.  · Corpus luteum cyst. This type of cyst occurs when the sac around the egg doesnt dissolve after the egg is released.  Endometrioma  This is a cyst filled with old blood and tissue from the lining of the uterus. They are often called chocolate cysts because of their dark color. They can happen in women with endometriosis.  Dermoid cyst  This cyst develops from ovarian cells and eggs. They may have hair, skin, or fat in them. These cysts are common in women of childbearing age.  What causes ovarian cysts?  Cysts can also be caused by:  · Polycystic ovary syndrome (PCOS), a condition that causes multiple cysts on the ovaries  · Pregnancy  · Severe pelvic infection, such as chlamydia  · Noncancerous growths  · Cancer (rare)  · Using fertility medicine to cause ovulation, such as clomiphene  Symptoms of an ovarian cyst  Many women dont have any symptoms from the cyst. In women with symptoms, the most common is pain or pressure in your lower belly on the side of the cyst. This pain may be dull or sharp, and it may come and go. A cyst that breaks open (ruptures) may lead to sudden, sharp pain.  Other symptoms of an ovarian cyst can include:  · Pain in the lower back or thighs  · Trouble emptying your bladder fully  · Pain during sex  · Weight gain  · Pain during your period  · Breast tenderness  · Abnormal vaginal bleeding (rare)  Diagnosing an ovarian cyst  Your primary care doctor or an obstetrics and gynecology (OB/GYN) doctor may diagnose the condition. Your doctor will ask about your health history and your symptoms. You will also have a physical exam. This will likely include a pelvic exam. During the pelvic exam, your doctor may feel the swelling on your ovary. In women with no symptoms, this is often the first sign of a cyst.  If your doctor thinks you may have an ovarian cyst, you may need tests. These can help your doctor learn the  type of cyst. Tests can also help rule out other problems, such as an ectopic pregnancy. The tests may include:  · Ultrasound. This test uses sound waves to view the size, shape, and location of the cyst. The test can also show if the growth is solid or filled with fluid.  · MRI. This uses large magnets and a computer to create a detailed picture of the area.  · Pregnancy test. This is done to check if pregnancy may be the cause of the cyst.  · Blood tests. These check for hormone problems and cancer. They also check if the cyst is bleeding.  · Biopsy. This is a test where a tiny piece of the ovary is taken. The piece is examined in a lab for cancer cells. This may be done if an ultrasound shows a certain type of growth on the ovary.  Date Last Reviewed: 5/6/2015  © 7659-2513 [employer]. [address] [place] [address], [place] [address]. All rights reserved. This information is not intended as a substitute for professional medical care. Always follow your healthcare professional's instructions.

## 2020-08-19 DIAGNOSIS — Z01.84 ANTIBODY RESPONSE EXAMINATION: ICD-10-CM

## 2020-09-04 ENCOUNTER — HOSPITAL ENCOUNTER (OUTPATIENT)
Dept: RADIOLOGY | Facility: HOSPITAL | Age: 50
Discharge: HOME | End: 2020-09-04
Attending: INTERNAL MEDICINE
Payer: COMMERCIAL

## 2020-09-04 DIAGNOSIS — E04.2 MULTINODULAR THYROID: ICD-10-CM

## 2020-09-04 PROCEDURE — 76536 US EXAM OF HEAD AND NECK: CPT | Mod: TC,PO

## 2020-09-18 DIAGNOSIS — Z01.84 ANTIBODY RESPONSE EXAMINATION: ICD-10-CM

## 2020-10-18 DIAGNOSIS — Z01.84 ANTIBODY RESPONSE EXAMINATION: ICD-10-CM

## 2020-11-17 DIAGNOSIS — Z01.84 ANTIBODY RESPONSE EXAMINATION: ICD-10-CM

## 2020-11-29 NOTE — PROGRESS NOTES
SUBJECTIVE:    Patient ID: Jada Fuchs is a 50 y.o. female.    Chief Complaint: Hypertension, Insomnia, and Follow-up    HPI     In for follow-up on Hypertension and sleep    HBP-no longer taking lisinopril due to undesirable side effects and last office visit BP was 120/74 has not been checking her BP at home-150/84    Sleep-was having no issue last office visit-pt stopped taking the trazodone 2 weeks ago due to losing hair-saw dermatologist-? Stress related-now getting steroid injections and applying local applications-pt has problems staying asleep-she has night sweats-she is post hysterecctomy but still has ovaries-no other sx of rachid-menopause      No visits with results within 6 Month(s) from this visit.   Latest known visit with results is:   Office Visit on 09/05/2019   Component Date Value Ref Range Status    Glucose 09/11/2019 83  65 - 99 mg/dL Final    BUN 09/11/2019 12  7 - 25 mg/dL Final    Creatinine 09/11/2019 0.77  0.50 - 1.10 mg/dL Final    eGFR if non African American 09/11/2019 92  > OR = 60 mL/min/1.73m2 Final    eGFR if African American 09/11/2019 107  > OR = 60 mL/min/1.73m2 Final    BUN/Creatinine Ratio 09/11/2019 NOT APPLICABLE  6 - 22 (calc) Final    Sodium 09/11/2019 138  135 - 146 mmol/L Final    Potassium 09/11/2019 4.3  3.5 - 5.3 mmol/L Final    Chloride 09/11/2019 106  98 - 110 mmol/L Final    CO2 09/11/2019 26  20 - 32 mmol/L Final    Calcium 09/11/2019 9.2  8.6 - 10.2 mg/dL Final    Total Protein 09/11/2019 7.3  6.1 - 8.1 g/dL Final    Albumin 09/11/2019 4.4  3.6 - 5.1 g/dL Final    Globulin, Total 09/11/2019 2.9  1.9 - 3.7 g/dL (calc) Final    Albumin/Globulin Ratio 09/11/2019 1.5  1.0 - 2.5 (calc) Final    Total Bilirubin 09/11/2019 0.4  0.2 - 1.2 mg/dL Final    Alkaline Phosphatase 09/11/2019 51  33 - 115 U/L Final    AST 09/11/2019 15  10 - 35 U/L Final    ALT 09/11/2019 13  6 - 29 U/L Final    Cholesterol 09/11/2019 174  <200 mg/dL Final    HDL  09/11/2019 74  >50 mg/dL Final    Triglycerides 09/11/2019 42  <150 mg/dL Final    LDL Cholesterol 09/11/2019 88  mg/dL (calc) Final    HDL/Cholesterol Ratio 09/11/2019 2.4  <5.0 (calc) Final    Non HDL Chol. (LDL+VLDL) 09/11/2019 100  <130 mg/dL (calc) Final    WBC Casts, UA 09/11/2019 0-5  < OR = 5 /HPF Final    RBC Casts, UA 09/11/2019 NONE SEEN  < OR = 2 /HPF Final    Squam Epithel, UA 09/11/2019 6-10* < OR = 5 /HPF Final    Bacteria, UA 09/11/2019 NONE SEEN  NONE SEEN /HPF Final    Hyaline Casts, UA 09/11/2019 NONE SEEN  NONE SEEN /LPF Final    WBC 09/11/2019 5.6  3.8 - 10.8 Thousand/uL Final    RBC 09/11/2019 4.29  3.80 - 5.10 Million/uL Final    Hemoglobin 09/11/2019 12.6  11.7 - 15.5 g/dL Final    Hematocrit 09/11/2019 38.2  35.0 - 45.0 % Final    MCV 09/11/2019 89.0  80.0 - 100.0 fL Final    MCH 09/11/2019 29.4  27.0 - 33.0 pg Final    MCHC 09/11/2019 33.0  32.0 - 36.0 g/dL Final    RDW 09/11/2019 13.1  11.0 - 15.0 % Final    Platelets 09/11/2019 179  140 - 400 Thousand/uL Final    MPV 09/11/2019 13.1* 7.5 - 12.5 fL Final    Neutrophils Absolute 09/11/2019 4,010  1,500 - 7,800 cells/uL Final    Lymph # 09/11/2019 896  850 - 3,900 cells/uL Final    Mono # 09/11/2019 431  200 - 950 cells/uL Final    Eos # 09/11/2019 241  15 - 500 cells/uL Final    Baso # 09/11/2019 22  0 - 200 cells/uL Final    Neutrophils Relative 09/11/2019 71.6  % Final    Lymph % 09/11/2019 16.0  % Final    Mono % 09/11/2019 7.7  % Final    Eosinophil % 09/11/2019 4.3  % Final    Basophil % 09/11/2019 0.4  % Final    TSH 09/11/2019 2.28  mIU/L Final       Past Medical History:   Diagnosis Date    Abdominal pain     Anemia     probably secondary to fibroid    Blood transfusion     History of uterine fibroid     Hypothyroidism     Wears glasses     CONTACS     Past Surgical History:   Procedure Laterality Date    BREAST SURGERY      mass removed    HYSTERECTOMY  2015     Family History   Problem  Relation Age of Onset    Cancer Maternal Grandfather         prostate    Hypertension Mother     Seizures Maternal Grandmother     Aneurysm Father     Eczema Sister     Melanoma Neg Hx        Marital Status:   Alcohol History:  reports current alcohol use.  Tobacco History:  reports that she has never smoked. She has never used smokeless tobacco.  Drug History:  reports no history of drug use.    Review of patient's allergies indicates:   Allergen Reactions    Lisinopril      UNDESIRABLE SIDE EFFECT       Current Outpatient Medications:     clobetasoL (TEMOVATE) 0.05 % external solution, APPLY SMALL AMOUNT TOPICALLY TWICE DAILY TO SCALP, Disp: , Rfl:     diphenhydramine HCl (UNISOM SLEEPGELS ORAL), Take 1 each by mouth nightly as needed., Disp: , Rfl:     hydroquinone 4 % Crea, aaa dark spots twice a day prn, avoid use more than 3 months, Disp: 30 g, Rfl: 1    ibuprofen (ADVIL,MOTRIN) 800 MG tablet, Take 1 tablet by mouth 2 (two) times daily as needed., Disp: , Rfl:     Review of Systems   Constitutional: Negative for appetite change, chills, diaphoresis, fatigue, fever and unexpected weight change.   HENT: Negative for congestion, ear pain, hearing loss, nosebleeds, postnasal drip, sinus pressure, sinus pain, sneezing, sore throat, tinnitus, trouble swallowing and voice change.    Eyes: Negative for photophobia, pain, itching and visual disturbance.   Respiratory: Negative for apnea, cough, chest tightness, shortness of breath, wheezing and stridor.    Cardiovascular: Negative for chest pain, palpitations and leg swelling.   Gastrointestinal: Negative for abdominal distention, abdominal pain, blood in stool, constipation, diarrhea, nausea and vomiting.   Endocrine: Negative for cold intolerance, heat intolerance, polydipsia and polyuria.   Genitourinary: Negative for difficulty urinating, dyspareunia, dysuria, flank pain, frequency, hematuria, menstrual problem, pelvic pain, urgency, vaginal  "discharge and vaginal pain.   Musculoskeletal: Negative for arthralgias, back pain, joint swelling, myalgias, neck pain and neck stiffness.   Skin: Negative for pallor.        Hair loss   Allergic/Immunologic: Negative for environmental allergies and food allergies.   Neurological: Positive for numbness (rare tingling in toes or up legs or in fingers). Negative for dizziness, tremors, speech difficulty, weakness and light-headedness.   Hematological: Does not bruise/bleed easily.   Psychiatric/Behavioral: Positive for decreased concentration (somewhat forgetful) and sleep disturbance (HPI). Negative for agitation, confusion and suicidal ideas. The patient is nervous/anxious (5-6/10).           Objective:      Vitals:    12/02/20 1640 12/02/20 1715 12/02/20 2041   BP: (!) 150/84 (!) 142/86 (!) 142/80   Pulse: 82     Resp: 16     Temp: 97.2 °F (36.2 °C)     SpO2: 99%     Weight: 58.5 kg (129 lb)     Height: 4' 11" (1.499 m)       Physical Exam  Constitutional:       General: She is not in acute distress.     Appearance: Normal appearance. She is normal weight. She is not ill-appearing.   HENT:      Head: Normocephalic and atraumatic.   Eyes:      Extraocular Movements: Extraocular movements intact.      Conjunctiva/sclera: Conjunctivae normal.      Pupils: Pupils are equal, round, and reactive to light.   Neck:      Musculoskeletal: Normal range of motion and neck supple.      Vascular: No carotid bruit.   Cardiovascular:      Rate and Rhythm: Normal rate and regular rhythm.      Pulses: Normal pulses.      Heart sounds: Normal heart sounds.   Pulmonary:      Effort: Pulmonary effort is normal.      Breath sounds: Normal breath sounds.   Abdominal:      General: Abdomen is flat. Bowel sounds are normal.      Palpations: Abdomen is soft.   Musculoskeletal: Normal range of motion.      Right lower leg: No edema.      Left lower leg: No edema.   Lymphadenopathy:      Cervical: No cervical adenopathy.   Skin:     General: " Skin is warm and dry.      Coloration: Skin is not jaundiced or pale.   Neurological:      General: No focal deficit present.      Mental Status: She is alert and oriented to person, place, and time.   Psychiatric:         Mood and Affect: Mood normal.         Behavior: Behavior normal.         Thought Content: Thought content normal.         Judgment: Judgment normal.           Assessment:       1. Essential hypertension, benign    2. Alopecia    3. Low vitamin D level    4. Encounter for screening mammogram for breast cancer    5. Routine general medical examination at a health care facility         Plan:       Essential hypertension, benign        -     Recheck BP one month    Alopecia  -     TSH; Future; Expected date: 12/02/2020    Low vitamin D level  -     Vitamin D; Future; Expected date: 12/03/2020    Encounter for screening mammogram for breast cancer  -     Mammo Digital Screening Bilat w/ John; Future; Expected date: 12/02/2020    Routine general medical examination at a health care facility  -     CBC Auto Differential; Future; Expected date: 12/02/2020  -     Comprehensive Metabolic Panel; Future; Expected date: 12/02/2020  -     Lipid Panel; Future; Expected date: 12/02/2020  -     Urinalysis, Reflex to Urine Culture Urine, Clean Catch; Future; Expected date: 12/02/2020      No follow-ups on file.        12/2/2020 Dary Ace M.D.

## 2020-12-02 ENCOUNTER — OFFICE VISIT (OUTPATIENT)
Dept: FAMILY MEDICINE | Facility: CLINIC | Age: 50
End: 2020-12-02
Payer: COMMERCIAL

## 2020-12-02 VITALS
HEIGHT: 59 IN | HEART RATE: 82 BPM | DIASTOLIC BLOOD PRESSURE: 80 MMHG | TEMPERATURE: 97 F | OXYGEN SATURATION: 99 % | RESPIRATION RATE: 16 BRPM | BODY MASS INDEX: 26 KG/M2 | WEIGHT: 129 LBS | SYSTOLIC BLOOD PRESSURE: 142 MMHG

## 2020-12-02 DIAGNOSIS — R79.89 LOW VITAMIN D LEVEL: ICD-10-CM

## 2020-12-02 DIAGNOSIS — Z00.00 ROUTINE GENERAL MEDICAL EXAMINATION AT A HEALTH CARE FACILITY: ICD-10-CM

## 2020-12-02 DIAGNOSIS — Z12.31 ENCOUNTER FOR SCREENING MAMMOGRAM FOR BREAST CANCER: ICD-10-CM

## 2020-12-02 DIAGNOSIS — L65.9 ALOPECIA: ICD-10-CM

## 2020-12-02 DIAGNOSIS — I10 ESSENTIAL HYPERTENSION, BENIGN: Primary | ICD-10-CM

## 2020-12-02 PROCEDURE — 3077F SYST BP >= 140 MM HG: CPT | Mod: S$GLB,,, | Performed by: INTERNAL MEDICINE

## 2020-12-02 PROCEDURE — 1126F AMNT PAIN NOTED NONE PRSNT: CPT | Mod: S$GLB,,, | Performed by: INTERNAL MEDICINE

## 2020-12-02 PROCEDURE — 1126F PR PAIN SEVERITY QUANTIFIED, NO PAIN PRESENT: ICD-10-PCS | Mod: S$GLB,,, | Performed by: INTERNAL MEDICINE

## 2020-12-02 PROCEDURE — 3008F PR BODY MASS INDEX (BMI) DOCUMENTED: ICD-10-PCS | Mod: S$GLB,,, | Performed by: INTERNAL MEDICINE

## 2020-12-02 PROCEDURE — 3079F PR MOST RECENT DIASTOLIC BLOOD PRESSURE 80-89 MM HG: ICD-10-PCS | Mod: S$GLB,,, | Performed by: INTERNAL MEDICINE

## 2020-12-02 PROCEDURE — 3077F PR MOST RECENT SYSTOLIC BLOOD PRESSURE >= 140 MM HG: ICD-10-PCS | Mod: S$GLB,,, | Performed by: INTERNAL MEDICINE

## 2020-12-02 PROCEDURE — 3008F BODY MASS INDEX DOCD: CPT | Mod: S$GLB,,, | Performed by: INTERNAL MEDICINE

## 2020-12-02 PROCEDURE — 3079F DIAST BP 80-89 MM HG: CPT | Mod: S$GLB,,, | Performed by: INTERNAL MEDICINE

## 2020-12-02 PROCEDURE — 99214 OFFICE O/P EST MOD 30 MIN: CPT | Mod: S$GLB,,, | Performed by: INTERNAL MEDICINE

## 2020-12-02 PROCEDURE — 99214 PR OFFICE/OUTPT VISIT, EST, LEVL IV, 30-39 MIN: ICD-10-PCS | Mod: S$GLB,,, | Performed by: INTERNAL MEDICINE

## 2020-12-02 RX ORDER — IBUPROFEN 800 MG/1
1 TABLET ORAL 2 TIMES DAILY PRN
COMMUNITY
Start: 2020-10-09 | End: 2022-03-13 | Stop reason: ALTCHOICE

## 2020-12-02 RX ORDER — TRETINOIN 0.5 MG/G
CREAM TOPICAL
COMMUNITY
Start: 2020-09-16 | End: 2020-12-02

## 2020-12-02 RX ORDER — CLOBETASOL PROPIONATE 0.46 MG/ML
SOLUTION TOPICAL
COMMUNITY
Start: 2020-10-22 | End: 2021-01-19

## 2020-12-07 ENCOUNTER — HOSPITAL ENCOUNTER (OUTPATIENT)
Dept: RADIOLOGY | Facility: HOSPITAL | Age: 50
Discharge: HOME | End: 2020-12-07
Attending: INTERNAL MEDICINE
Payer: COMMERCIAL

## 2020-12-07 ENCOUNTER — LAB VISIT (OUTPATIENT)
Dept: LAB | Facility: HOSPITAL | Age: 50
End: 2020-12-07
Attending: INTERNAL MEDICINE
Payer: COMMERCIAL

## 2020-12-07 VITALS — HEIGHT: 59 IN | WEIGHT: 129 LBS | BODY MASS INDEX: 26 KG/M2

## 2020-12-07 DIAGNOSIS — R79.89 LOW VITAMIN D LEVEL: ICD-10-CM

## 2020-12-07 DIAGNOSIS — Z00.00 ROUTINE GENERAL MEDICAL EXAMINATION AT A HEALTH CARE FACILITY: ICD-10-CM

## 2020-12-07 DIAGNOSIS — Z12.31 ENCOUNTER FOR SCREENING MAMMOGRAM FOR BREAST CANCER: ICD-10-CM

## 2020-12-07 DIAGNOSIS — L65.9 ALOPECIA: ICD-10-CM

## 2020-12-07 DIAGNOSIS — E78.00 PURE HYPERCHOLESTEROLEMIA: Primary | ICD-10-CM

## 2020-12-07 LAB
25(OH)D3+25(OH)D2 SERPL-MCNC: 33 NG/ML (ref 30–96)
ALBUMIN SERPL BCP-MCNC: 4.7 G/DL (ref 3.5–5.2)
ALP SERPL-CCNC: 49 U/L (ref 55–135)
ALT SERPL W/O P-5'-P-CCNC: 20 U/L (ref 10–44)
ANION GAP SERPL CALC-SCNC: 7 MMOL/L (ref 8–16)
AST SERPL-CCNC: 20 U/L (ref 10–40)
BASOPHILS # BLD AUTO: 0.02 K/UL (ref 0–0.2)
BASOPHILS NFR BLD: 0.4 % (ref 0–1.9)
BILIRUB SERPL-MCNC: 0.9 MG/DL (ref 0.1–1)
BILIRUB UR QL STRIP: NEGATIVE
BUN SERPL-MCNC: 15 MG/DL (ref 6–20)
CALCIUM SERPL-MCNC: 9.6 MG/DL (ref 8.7–10.5)
CHLORIDE SERPL-SCNC: 105 MMOL/L (ref 95–110)
CHOLEST SERPL-MCNC: 208 MG/DL (ref 120–199)
CHOLEST/HDLC SERPL: 2.7 {RATIO} (ref 2–5)
CLARITY UR: CLEAR
CO2 SERPL-SCNC: 27 MMOL/L (ref 23–29)
COLOR UR: COLORLESS
CREAT SERPL-MCNC: 0.6 MG/DL (ref 0.5–1.4)
DIFFERENTIAL METHOD: NORMAL
EOSINOPHIL # BLD AUTO: 0 K/UL (ref 0–0.5)
EOSINOPHIL NFR BLD: 0.2 % (ref 0–8)
ERYTHROCYTE [DISTWIDTH] IN BLOOD BY AUTOMATED COUNT: 12.3 % (ref 11.5–14.5)
EST. GFR  (AFRICAN AMERICAN): >60 ML/MIN/1.73 M^2
EST. GFR  (NON AFRICAN AMERICAN): >60 ML/MIN/1.73 M^2
GLUCOSE SERPL-MCNC: 90 MG/DL (ref 70–110)
GLUCOSE UR QL STRIP: NEGATIVE
HCT VFR BLD AUTO: 38 % (ref 37–48.5)
HDLC SERPL-MCNC: 77 MG/DL (ref 40–75)
HDLC SERPL: 37 % (ref 20–50)
HGB BLD-MCNC: 12.3 G/DL (ref 12–16)
HGB UR QL STRIP: NEGATIVE
IMM GRANULOCYTES # BLD AUTO: 0.01 K/UL (ref 0–0.04)
IMM GRANULOCYTES NFR BLD AUTO: 0.2 % (ref 0–0.5)
KETONES UR QL STRIP: NEGATIVE
LDLC SERPL CALC-MCNC: 125.2 MG/DL (ref 63–159)
LEUKOCYTE ESTERASE UR QL STRIP: NEGATIVE
LYMPHOCYTES # BLD AUTO: 1.2 K/UL (ref 1–4.8)
LYMPHOCYTES NFR BLD: 26.6 % (ref 18–48)
MCH RBC QN AUTO: 29.8 PG (ref 27–31)
MCHC RBC AUTO-ENTMCNC: 32.4 G/DL (ref 32–36)
MCV RBC AUTO: 92 FL (ref 82–98)
MONOCYTES # BLD AUTO: 0.3 K/UL (ref 0.3–1)
MONOCYTES NFR BLD: 6.9 % (ref 4–15)
NEUTROPHILS # BLD AUTO: 2.9 K/UL (ref 1.8–7.7)
NEUTROPHILS NFR BLD: 65.7 % (ref 38–73)
NITRITE UR QL STRIP: NEGATIVE
NONHDLC SERPL-MCNC: 131 MG/DL
NRBC BLD-RTO: 0 /100 WBC
PH UR STRIP: 7 [PH] (ref 5–8)
PLATELET # BLD AUTO: 192 K/UL (ref 150–350)
PMV BLD AUTO: 12.2 FL (ref 9.2–12.9)
POTASSIUM SERPL-SCNC: 3.6 MMOL/L (ref 3.5–5.1)
PROT SERPL-MCNC: 7.5 G/DL (ref 6–8.4)
PROT UR QL STRIP: NEGATIVE
RBC # BLD AUTO: 4.13 M/UL (ref 4–5.4)
SODIUM SERPL-SCNC: 139 MMOL/L (ref 136–145)
SP GR UR STRIP: 1.01 (ref 1–1.03)
TRIGL SERPL-MCNC: 29 MG/DL (ref 30–150)
TSH SERPL DL<=0.005 MIU/L-ACNC: 1.6 UIU/ML (ref 0.34–5.6)
URN SPEC COLLECT METH UR: ABNORMAL
UROBILINOGEN UR STRIP-ACNC: NEGATIVE EU/DL
WBC # BLD AUTO: 4.47 K/UL (ref 3.9–12.7)

## 2020-12-07 PROCEDURE — 80053 COMPREHEN METABOLIC PANEL: CPT

## 2020-12-07 PROCEDURE — 84443 ASSAY THYROID STIM HORMONE: CPT

## 2020-12-07 PROCEDURE — 77067 SCR MAMMO BI INCL CAD: CPT | Mod: TC,PO

## 2020-12-07 PROCEDURE — 36415 COLL VENOUS BLD VENIPUNCTURE: CPT

## 2020-12-07 PROCEDURE — 82306 VITAMIN D 25 HYDROXY: CPT

## 2020-12-07 PROCEDURE — 85025 COMPLETE CBC W/AUTO DIFF WBC: CPT

## 2020-12-07 PROCEDURE — 80061 LIPID PANEL: CPT

## 2020-12-07 PROCEDURE — 81003 URINALYSIS AUTO W/O SCOPE: CPT

## 2020-12-09 RX ORDER — ROSUVASTATIN CALCIUM 5 MG/1
5 TABLET, COATED ORAL DAILY
Qty: 90 TABLET | Refills: 1 | Status: SHIPPED | OUTPATIENT
Start: 2020-12-09 | End: 2021-11-11

## 2020-12-09 NOTE — PROGRESS NOTES
LDL is a little elevated-suggest low dose crestor 5 mg every pm and recheck lipids in 6 month-low fat diet Stable

## 2021-01-19 ENCOUNTER — OFFICE VISIT (OUTPATIENT)
Dept: FAMILY MEDICINE | Facility: CLINIC | Age: 51
End: 2021-01-19
Payer: COMMERCIAL

## 2021-01-19 VITALS
TEMPERATURE: 98 F | BODY MASS INDEX: 25.6 KG/M2 | SYSTOLIC BLOOD PRESSURE: 136 MMHG | OXYGEN SATURATION: 98 % | RESPIRATION RATE: 16 BRPM | HEART RATE: 94 BPM | WEIGHT: 127 LBS | DIASTOLIC BLOOD PRESSURE: 80 MMHG | HEIGHT: 59 IN

## 2021-01-19 DIAGNOSIS — L65.9 ALOPECIA: ICD-10-CM

## 2021-01-19 DIAGNOSIS — Z11.4 SCREENING FOR HIV (HUMAN IMMUNODEFICIENCY VIRUS): ICD-10-CM

## 2021-01-19 DIAGNOSIS — R03.0 ELEVATED BLOOD PRESSURE READING WITHOUT DIAGNOSIS OF HYPERTENSION: Primary | ICD-10-CM

## 2021-01-19 PROCEDURE — 3075F SYST BP GE 130 - 139MM HG: CPT | Mod: S$GLB,,, | Performed by: INTERNAL MEDICINE

## 2021-01-19 PROCEDURE — 3075F PR MOST RECENT SYSTOLIC BLOOD PRESS GE 130-139MM HG: ICD-10-PCS | Mod: S$GLB,,, | Performed by: INTERNAL MEDICINE

## 2021-01-19 PROCEDURE — 1126F AMNT PAIN NOTED NONE PRSNT: CPT | Mod: S$GLB,,, | Performed by: INTERNAL MEDICINE

## 2021-01-19 PROCEDURE — 3008F BODY MASS INDEX DOCD: CPT | Mod: S$GLB,,, | Performed by: INTERNAL MEDICINE

## 2021-01-19 PROCEDURE — 3079F DIAST BP 80-89 MM HG: CPT | Mod: S$GLB,,, | Performed by: INTERNAL MEDICINE

## 2021-01-19 PROCEDURE — 3008F PR BODY MASS INDEX (BMI) DOCUMENTED: ICD-10-PCS | Mod: S$GLB,,, | Performed by: INTERNAL MEDICINE

## 2021-01-19 PROCEDURE — 1126F PR PAIN SEVERITY QUANTIFIED, NO PAIN PRESENT: ICD-10-PCS | Mod: S$GLB,,, | Performed by: INTERNAL MEDICINE

## 2021-01-19 PROCEDURE — 99213 PR OFFICE/OUTPT VISIT, EST, LEVL III, 20-29 MIN: ICD-10-PCS | Mod: S$GLB,,, | Performed by: INTERNAL MEDICINE

## 2021-01-19 PROCEDURE — 99213 OFFICE O/P EST LOW 20 MIN: CPT | Mod: S$GLB,,, | Performed by: INTERNAL MEDICINE

## 2021-01-19 PROCEDURE — 3079F PR MOST RECENT DIASTOLIC BLOOD PRESSURE 80-89 MM HG: ICD-10-PCS | Mod: S$GLB,,, | Performed by: INTERNAL MEDICINE

## 2021-01-19 RX ORDER — MELATONIN 5 MG
1 CAPSULE ORAL DAILY
COMMUNITY
End: 2021-11-11

## 2021-03-04 ENCOUNTER — LAB VISIT (OUTPATIENT)
Dept: LAB | Facility: HOSPITAL | Age: 51
End: 2021-03-04
Attending: INTERNAL MEDICINE
Payer: COMMERCIAL

## 2021-03-04 DIAGNOSIS — L65.9 ALOPECIA: ICD-10-CM

## 2021-03-04 DIAGNOSIS — Z11.4 SCREENING FOR HIV (HUMAN IMMUNODEFICIENCY VIRUS): ICD-10-CM

## 2021-03-04 LAB — ERYTHROCYTE [SEDIMENTATION RATE] IN BLOOD BY WESTERGREN METHOD: 9 MM/HR (ref 0–20)

## 2021-03-04 PROCEDURE — 86038 ANTINUCLEAR ANTIBODIES: CPT | Performed by: INTERNAL MEDICINE

## 2021-03-04 PROCEDURE — 36415 COLL VENOUS BLD VENIPUNCTURE: CPT | Performed by: INTERNAL MEDICINE

## 2021-03-04 PROCEDURE — 85651 RBC SED RATE NONAUTOMATED: CPT | Performed by: INTERNAL MEDICINE

## 2021-03-04 PROCEDURE — 82672 ASSAY OF ESTROGEN: CPT | Performed by: INTERNAL MEDICINE

## 2021-03-04 PROCEDURE — 87389 HIV-1 AG W/HIV-1&-2 AB AG IA: CPT | Performed by: INTERNAL MEDICINE

## 2021-03-04 PROCEDURE — 84403 ASSAY OF TOTAL TESTOSTERONE: CPT | Performed by: INTERNAL MEDICINE

## 2021-03-05 LAB
ANA TITR SER IF: NEGATIVE {TITER}
HIV 1+2 AB+HIV1 P24 AG SERPL QL IA: NON REACTIVE
TESTOST SERPL-MCNC: <3 NG/DL (ref 8–48)

## 2021-03-11 LAB — ESTROGEN SERPL-MCNC: 232 PG/ML

## 2021-03-25 ENCOUNTER — TELEPHONE (OUTPATIENT)
Dept: FAMILY MEDICINE | Facility: CLINIC | Age: 51
End: 2021-03-25

## 2021-05-06 ENCOUNTER — PATIENT MESSAGE (OUTPATIENT)
Dept: RESEARCH | Facility: HOSPITAL | Age: 51
End: 2021-05-06

## 2021-05-17 RX ORDER — METHOCARBAMOL 750 MG/1
1 TABLET, FILM COATED ORAL 2 TIMES DAILY PRN
COMMUNITY
Start: 2021-05-03 | End: 2021-11-11

## 2021-05-17 RX ORDER — LINACLOTIDE 145 UG/1
1 CAPSULE, GELATIN COATED ORAL DAILY
COMMUNITY
Start: 2021-03-02 | End: 2021-11-11

## 2021-05-19 ENCOUNTER — OFFICE VISIT (OUTPATIENT)
Dept: FAMILY MEDICINE | Facility: CLINIC | Age: 51
End: 2021-05-19
Payer: COMMERCIAL

## 2021-05-19 VITALS
TEMPERATURE: 98 F | SYSTOLIC BLOOD PRESSURE: 138 MMHG | HEART RATE: 74 BPM | WEIGHT: 128.13 LBS | DIASTOLIC BLOOD PRESSURE: 80 MMHG | BODY MASS INDEX: 25.83 KG/M2 | OXYGEN SATURATION: 99 % | RESPIRATION RATE: 16 BRPM | HEIGHT: 59 IN

## 2021-05-19 DIAGNOSIS — Z78.9 STATIN INTOLERANCE: ICD-10-CM

## 2021-05-19 DIAGNOSIS — I10 ESSENTIAL HYPERTENSION, BENIGN: Primary | ICD-10-CM

## 2021-05-19 DIAGNOSIS — T46.6X5A STATIN MYOPATHY: ICD-10-CM

## 2021-05-19 DIAGNOSIS — E78.00 PURE HYPERCHOLESTEROLEMIA: ICD-10-CM

## 2021-05-19 DIAGNOSIS — E74.10 FRUCTOSE INTOLERANCE: ICD-10-CM

## 2021-05-19 DIAGNOSIS — K59.01 SLOW TRANSIT CONSTIPATION: ICD-10-CM

## 2021-05-19 DIAGNOSIS — G72.0 STATIN MYOPATHY: ICD-10-CM

## 2021-05-19 PROCEDURE — 99214 PR OFFICE/OUTPT VISIT, EST, LEVL IV, 30-39 MIN: ICD-10-PCS | Mod: S$GLB,,, | Performed by: INTERNAL MEDICINE

## 2021-05-19 PROCEDURE — 3008F BODY MASS INDEX DOCD: CPT | Mod: S$GLB,,, | Performed by: INTERNAL MEDICINE

## 2021-05-19 PROCEDURE — 3079F PR MOST RECENT DIASTOLIC BLOOD PRESSURE 80-89 MM HG: ICD-10-PCS | Mod: S$GLB,,, | Performed by: INTERNAL MEDICINE

## 2021-05-19 PROCEDURE — 1126F AMNT PAIN NOTED NONE PRSNT: CPT | Mod: S$GLB,,, | Performed by: INTERNAL MEDICINE

## 2021-05-19 PROCEDURE — 3075F PR MOST RECENT SYSTOLIC BLOOD PRESS GE 130-139MM HG: ICD-10-PCS | Mod: S$GLB,,, | Performed by: INTERNAL MEDICINE

## 2021-05-19 PROCEDURE — 3079F DIAST BP 80-89 MM HG: CPT | Mod: S$GLB,,, | Performed by: INTERNAL MEDICINE

## 2021-05-19 PROCEDURE — 1126F PR PAIN SEVERITY QUANTIFIED, NO PAIN PRESENT: ICD-10-PCS | Mod: S$GLB,,, | Performed by: INTERNAL MEDICINE

## 2021-05-19 PROCEDURE — 99214 OFFICE O/P EST MOD 30 MIN: CPT | Mod: S$GLB,,, | Performed by: INTERNAL MEDICINE

## 2021-05-19 PROCEDURE — 3008F PR BODY MASS INDEX (BMI) DOCUMENTED: ICD-10-PCS | Mod: S$GLB,,, | Performed by: INTERNAL MEDICINE

## 2021-05-19 PROCEDURE — 3075F SYST BP GE 130 - 139MM HG: CPT | Mod: S$GLB,,, | Performed by: INTERNAL MEDICINE

## 2021-05-19 RX ORDER — BEMPEDOIC ACID 180 MG/1
180 TABLET, FILM COATED ORAL DAILY
Qty: 90 TABLET | Refills: 1 | Status: SHIPPED | OUTPATIENT
Start: 2021-05-19 | End: 2021-10-21 | Stop reason: SDUPTHER

## 2021-05-19 RX ORDER — HYDROCHLOROTHIAZIDE 12.5 MG/1
TABLET ORAL
Qty: 15 TABLET | Refills: 3 | Status: SHIPPED | OUTPATIENT
Start: 2021-05-19 | End: 2021-10-21 | Stop reason: SDUPTHER

## 2021-11-11 ENCOUNTER — OFFICE VISIT (OUTPATIENT)
Dept: FAMILY MEDICINE | Facility: CLINIC | Age: 51
End: 2021-11-11
Payer: COMMERCIAL

## 2021-11-11 VITALS
TEMPERATURE: 98 F | RESPIRATION RATE: 12 BRPM | OXYGEN SATURATION: 99 % | HEART RATE: 86 BPM | SYSTOLIC BLOOD PRESSURE: 138 MMHG | WEIGHT: 135 LBS | DIASTOLIC BLOOD PRESSURE: 86 MMHG | BODY MASS INDEX: 27.21 KG/M2 | HEIGHT: 59 IN

## 2021-11-11 DIAGNOSIS — R20.2 PARESTHESIAS: ICD-10-CM

## 2021-11-11 DIAGNOSIS — R79.89 LOW TESTOSTERONE LEVEL IN FEMALE: ICD-10-CM

## 2021-11-11 DIAGNOSIS — Z12.31 ENCOUNTER FOR SCREENING MAMMOGRAM FOR BREAST CANCER: ICD-10-CM

## 2021-11-11 DIAGNOSIS — I10 ESSENTIAL HYPERTENSION, BENIGN: Primary | ICD-10-CM

## 2021-11-11 DIAGNOSIS — D70.8 OTHER NEUTROPENIA: ICD-10-CM

## 2021-11-11 DIAGNOSIS — E04.1 THYROID NODULE: ICD-10-CM

## 2021-11-11 DIAGNOSIS — E78.00 PURE HYPERCHOLESTEROLEMIA: ICD-10-CM

## 2021-11-11 PROCEDURE — 3075F SYST BP GE 130 - 139MM HG: CPT | Mod: S$GLB,,, | Performed by: INTERNAL MEDICINE

## 2021-11-11 PROCEDURE — 3008F PR BODY MASS INDEX (BMI) DOCUMENTED: ICD-10-PCS | Mod: S$GLB,,, | Performed by: INTERNAL MEDICINE

## 2021-11-11 PROCEDURE — 3079F DIAST BP 80-89 MM HG: CPT | Mod: S$GLB,,, | Performed by: INTERNAL MEDICINE

## 2021-11-11 PROCEDURE — 1160F RVW MEDS BY RX/DR IN RCRD: CPT | Mod: S$GLB,,, | Performed by: INTERNAL MEDICINE

## 2021-11-11 PROCEDURE — 1160F PR REVIEW ALL MEDS BY PRESCRIBER/CLIN PHARMACIST DOCUMENTED: ICD-10-PCS | Mod: S$GLB,,, | Performed by: INTERNAL MEDICINE

## 2021-11-11 PROCEDURE — 3008F BODY MASS INDEX DOCD: CPT | Mod: S$GLB,,, | Performed by: INTERNAL MEDICINE

## 2021-11-11 PROCEDURE — 3079F PR MOST RECENT DIASTOLIC BLOOD PRESSURE 80-89 MM HG: ICD-10-PCS | Mod: S$GLB,,, | Performed by: INTERNAL MEDICINE

## 2021-11-11 PROCEDURE — 3075F PR MOST RECENT SYSTOLIC BLOOD PRESS GE 130-139MM HG: ICD-10-PCS | Mod: S$GLB,,, | Performed by: INTERNAL MEDICINE

## 2021-11-11 PROCEDURE — 99214 OFFICE O/P EST MOD 30 MIN: CPT | Mod: S$GLB,,, | Performed by: INTERNAL MEDICINE

## 2021-11-11 PROCEDURE — 99214 PR OFFICE/OUTPT VISIT, EST, LEVL IV, 30-39 MIN: ICD-10-PCS | Mod: S$GLB,,, | Performed by: INTERNAL MEDICINE

## 2021-11-11 RX ORDER — HYDROCHLOROTHIAZIDE 12.5 MG/1
TABLET ORAL
Qty: 45 TABLET | Refills: 3 | Status: SHIPPED | OUTPATIENT
Start: 2021-11-11 | End: 2022-05-24

## 2021-12-09 ENCOUNTER — HOSPITAL ENCOUNTER (OUTPATIENT)
Dept: RADIOLOGY | Facility: HOSPITAL | Age: 51
Discharge: HOME | End: 2021-12-09
Attending: INTERNAL MEDICINE
Payer: COMMERCIAL

## 2021-12-09 ENCOUNTER — LAB VISIT (OUTPATIENT)
Dept: LAB | Facility: HOSPITAL | Age: 51
End: 2021-12-09
Attending: INTERNAL MEDICINE
Payer: COMMERCIAL

## 2021-12-09 DIAGNOSIS — I10 ESSENTIAL HYPERTENSION, BENIGN: ICD-10-CM

## 2021-12-09 DIAGNOSIS — Z12.31 ENCOUNTER FOR SCREENING MAMMOGRAM FOR BREAST CANCER: ICD-10-CM

## 2021-12-09 DIAGNOSIS — E04.1 THYROID NODULE: ICD-10-CM

## 2021-12-09 DIAGNOSIS — D70.8 OTHER NEUTROPENIA: ICD-10-CM

## 2021-12-09 DIAGNOSIS — E78.00 PURE HYPERCHOLESTEROLEMIA: ICD-10-CM

## 2021-12-09 DIAGNOSIS — R20.2 PARESTHESIAS: ICD-10-CM

## 2021-12-09 LAB
ALBUMIN SERPL BCP-MCNC: 4.2 G/DL (ref 3.5–5.2)
ALP SERPL-CCNC: 47 U/L (ref 55–135)
ALT SERPL W/O P-5'-P-CCNC: 21 U/L (ref 10–44)
ANION GAP SERPL CALC-SCNC: 7 MMOL/L (ref 8–16)
AST SERPL-CCNC: 24 U/L (ref 10–40)
BASOPHILS # BLD AUTO: 0.01 K/UL (ref 0–0.2)
BASOPHILS NFR BLD: 0.3 % (ref 0–1.9)
BILIRUB SERPL-MCNC: 0.8 MG/DL (ref 0.1–1)
BUN SERPL-MCNC: 14 MG/DL (ref 6–20)
CALCIUM SERPL-MCNC: 8.9 MG/DL (ref 8.7–10.5)
CHLORIDE SERPL-SCNC: 104 MMOL/L (ref 95–110)
CHOLEST SERPL-MCNC: 189 MG/DL (ref 120–199)
CHOLEST/HDLC SERPL: 2.1 {RATIO} (ref 2–5)
CO2 SERPL-SCNC: 28 MMOL/L (ref 23–29)
CREAT SERPL-MCNC: 0.8 MG/DL (ref 0.5–1.4)
DIFFERENTIAL METHOD: ABNORMAL
EOSINOPHIL # BLD AUTO: 0.1 K/UL (ref 0–0.5)
EOSINOPHIL NFR BLD: 1.5 % (ref 0–8)
ERYTHROCYTE [DISTWIDTH] IN BLOOD BY AUTOMATED COUNT: 12.7 % (ref 11.5–14.5)
EST. GFR  (AFRICAN AMERICAN): >60 ML/MIN/1.73 M^2
EST. GFR  (NON AFRICAN AMERICAN): >60 ML/MIN/1.73 M^2
GLUCOSE SERPL-MCNC: 86 MG/DL (ref 70–110)
HCT VFR BLD AUTO: 38.8 % (ref 37–48.5)
HDLC SERPL-MCNC: 90 MG/DL (ref 40–75)
HDLC SERPL: 47.6 % (ref 20–50)
HGB BLD-MCNC: 12.6 G/DL (ref 12–16)
IMM GRANULOCYTES # BLD AUTO: 0.01 K/UL (ref 0–0.04)
IMM GRANULOCYTES NFR BLD AUTO: 0.3 % (ref 0–0.5)
LDLC SERPL CALC-MCNC: 91.8 MG/DL (ref 63–159)
LYMPHOCYTES # BLD AUTO: 1.3 K/UL (ref 1–4.8)
LYMPHOCYTES NFR BLD: 37.4 % (ref 18–48)
MCH RBC QN AUTO: 30.1 PG (ref 27–31)
MCHC RBC AUTO-ENTMCNC: 32.5 G/DL (ref 32–36)
MCV RBC AUTO: 93 FL (ref 82–98)
MONOCYTES # BLD AUTO: 0.3 K/UL (ref 0.3–1)
MONOCYTES NFR BLD: 8.6 % (ref 4–15)
NEUTROPHILS # BLD AUTO: 1.8 K/UL (ref 1.8–7.7)
NEUTROPHILS NFR BLD: 51.9 % (ref 38–73)
NONHDLC SERPL-MCNC: 99 MG/DL
NRBC BLD-RTO: 0 /100 WBC
PLATELET # BLD AUTO: 156 K/UL (ref 150–450)
PMV BLD AUTO: 12.5 FL (ref 9.2–12.9)
POTASSIUM SERPL-SCNC: 3.9 MMOL/L (ref 3.5–5.1)
PROT SERPL-MCNC: 7.3 G/DL (ref 6–8.4)
RBC # BLD AUTO: 4.18 M/UL (ref 4–5.4)
SODIUM SERPL-SCNC: 139 MMOL/L (ref 136–145)
TRIGL SERPL-MCNC: 36 MG/DL (ref 30–150)
VIT B12 SERPL-MCNC: 569 PG/ML (ref 210–950)
WBC # BLD AUTO: 3.37 K/UL (ref 3.9–12.7)

## 2021-12-09 PROCEDURE — 76536 US EXAM OF HEAD AND NECK: CPT | Mod: TC,PO

## 2021-12-09 PROCEDURE — 77067 SCR MAMMO BI INCL CAD: CPT | Mod: TC,PO

## 2021-12-09 PROCEDURE — 80053 COMPREHEN METABOLIC PANEL: CPT | Performed by: INTERNAL MEDICINE

## 2021-12-09 PROCEDURE — 36415 COLL VENOUS BLD VENIPUNCTURE: CPT | Performed by: INTERNAL MEDICINE

## 2021-12-09 PROCEDURE — 84207 ASSAY OF VITAMIN B-6: CPT | Performed by: INTERNAL MEDICINE

## 2021-12-09 PROCEDURE — 80061 LIPID PANEL: CPT | Performed by: INTERNAL MEDICINE

## 2021-12-09 PROCEDURE — 82607 VITAMIN B-12: CPT | Performed by: INTERNAL MEDICINE

## 2021-12-09 PROCEDURE — 85025 COMPLETE CBC W/AUTO DIFF WBC: CPT | Performed by: INTERNAL MEDICINE

## 2021-12-15 DIAGNOSIS — E78.00 PURE HYPERCHOLESTEROLEMIA: Primary | ICD-10-CM

## 2021-12-15 LAB — VIT B6 SERPL-MCNC: 51.2 UG/L (ref 2–32.8)

## 2021-12-15 RX ORDER — ROSUVASTATIN CALCIUM 5 MG/1
5 TABLET, COATED ORAL DAILY
Qty: 90 TABLET | Refills: 3 | Status: SHIPPED | OUTPATIENT
Start: 2021-12-15 | End: 2022-03-13

## 2022-03-13 ENCOUNTER — OFFICE VISIT (OUTPATIENT)
Dept: URGENT CARE | Facility: CLINIC | Age: 52
End: 2022-03-13
Payer: COMMERCIAL

## 2022-03-13 VITALS
HEART RATE: 85 BPM | DIASTOLIC BLOOD PRESSURE: 97 MMHG | BODY MASS INDEX: 26.61 KG/M2 | OXYGEN SATURATION: 100 % | HEIGHT: 59 IN | TEMPERATURE: 98 F | RESPIRATION RATE: 20 BRPM | WEIGHT: 132 LBS | SYSTOLIC BLOOD PRESSURE: 169 MMHG

## 2022-03-13 DIAGNOSIS — L03.032 PARONYCHIA OF FIFTH TOE OF LEFT FOOT: Primary | ICD-10-CM

## 2022-03-13 PROCEDURE — 1160F RVW MEDS BY RX/DR IN RCRD: CPT | Mod: CPTII,S$GLB,, | Performed by: NURSE PRACTITIONER

## 2022-03-13 PROCEDURE — 3077F PR MOST RECENT SYSTOLIC BLOOD PRESSURE >= 140 MM HG: ICD-10-PCS | Mod: CPTII,S$GLB,, | Performed by: NURSE PRACTITIONER

## 2022-03-13 PROCEDURE — 1159F PR MEDICATION LIST DOCUMENTED IN MEDICAL RECORD: ICD-10-PCS | Mod: CPTII,S$GLB,, | Performed by: NURSE PRACTITIONER

## 2022-03-13 PROCEDURE — 3080F PR MOST RECENT DIASTOLIC BLOOD PRESSURE >= 90 MM HG: ICD-10-PCS | Mod: CPTII,S$GLB,, | Performed by: NURSE PRACTITIONER

## 2022-03-13 PROCEDURE — 99214 PR OFFICE/OUTPT VISIT, EST, LEVL IV, 30-39 MIN: ICD-10-PCS | Mod: S$GLB,,, | Performed by: NURSE PRACTITIONER

## 2022-03-13 PROCEDURE — 3008F PR BODY MASS INDEX (BMI) DOCUMENTED: ICD-10-PCS | Mod: CPTII,S$GLB,, | Performed by: NURSE PRACTITIONER

## 2022-03-13 PROCEDURE — 99214 OFFICE O/P EST MOD 30 MIN: CPT | Mod: S$GLB,,, | Performed by: NURSE PRACTITIONER

## 2022-03-13 PROCEDURE — 3080F DIAST BP >= 90 MM HG: CPT | Mod: CPTII,S$GLB,, | Performed by: NURSE PRACTITIONER

## 2022-03-13 PROCEDURE — 3077F SYST BP >= 140 MM HG: CPT | Mod: CPTII,S$GLB,, | Performed by: NURSE PRACTITIONER

## 2022-03-13 PROCEDURE — 1160F PR REVIEW ALL MEDS BY PRESCRIBER/CLIN PHARMACIST DOCUMENTED: ICD-10-PCS | Mod: CPTII,S$GLB,, | Performed by: NURSE PRACTITIONER

## 2022-03-13 PROCEDURE — 1159F MED LIST DOCD IN RCRD: CPT | Mod: CPTII,S$GLB,, | Performed by: NURSE PRACTITIONER

## 2022-03-13 PROCEDURE — 3008F BODY MASS INDEX DOCD: CPT | Mod: CPTII,S$GLB,, | Performed by: NURSE PRACTITIONER

## 2022-03-13 RX ORDER — NAPROXEN 500 MG/1
500 TABLET ORAL 2 TIMES DAILY PRN
Qty: 60 TABLET | Refills: 0 | Status: SHIPPED | OUTPATIENT
Start: 2022-03-13 | End: 2022-06-28

## 2022-03-13 RX ORDER — MUPIROCIN 20 MG/G
OINTMENT TOPICAL 3 TIMES DAILY
Qty: 15 G | Refills: 0 | Status: SHIPPED | OUTPATIENT
Start: 2022-03-13 | End: 2022-03-23

## 2022-03-13 RX ORDER — CEPHALEXIN 500 MG/1
500 CAPSULE ORAL 4 TIMES DAILY
Qty: 30 CAPSULE | Refills: 0 | Status: SHIPPED | OUTPATIENT
Start: 2022-03-13 | End: 2022-03-18

## 2022-03-13 NOTE — PROGRESS NOTES
"Subjective:       Patient ID: Jada Fuchs is a 51 y.o. female.    Vitals:  height is 4' 11" (1.499 m) and weight is 59.9 kg (132 lb). Her temperature is 97.6 °F (36.4 °C). Her blood pressure is 169/97 (abnormal) and her pulse is 85. Her respiration is 20 and oxygen saturation is 100%.     Chief Complaint: Toe Injury    Jada Fuchs is a 51 y.o. female who presents to clinic for evaluation of left fifth toe pain.  She states the pain began approximately 1 week ago after trimming her toenails.  She states she cut her left fifth toe nail too short causing it to bleed.  She reports she noticed swelling around the nail bed a day or two later.  She denies other injury or trauma.  She states she has been able to ambulate without assistance.  She denies fever, chills, drainage.  She denies attempts to treat prior to this visit.    Toe Injury  Pertinent negatives include no chills, fever, joint swelling, numbness or rash.       Constitution: Negative for chills and fever.   Cardiovascular: Negative for leg swelling.   Musculoskeletal: Positive for pain (left fifth toe) and trauma. Negative for joint swelling and abnormal ROM of joint.   Skin: Negative for pale, rash, laceration and bruising.   Neurological: Negative for numbness and tingling.       Objective:      Physical Exam     Nursing note and vitals reviewed.  BP (!) 169/97   Pulse 85   Temp 97.6 °F (36.4 °C)   Resp 20   Ht 4' 11" (1.499 m)   Wt 59.9 kg (132 lb)   LMP 08/14/2013   SpO2 100%   BMI 26.66 kg/m²     Constitutional: AAOx3. No immediate or signs of toxicity or distress.  Eyes: EOMI. No discharge. Anicteric.  HENT:   Neck: Normal range of motion. Neck supple.  Cardiovascular: Normal rate.  Pulmonary/Chest: No respiratory distress. Effort normal.  No tachypnea. Speaking in full sentences.   Abdominal: No distension and no mass.   Musculoskeletal: Appropriate range of motion.   Foot/Toes: Patient is able to bear weight and ambulate without " pain. No ecchymosis, erythema, lesions, ulcers, or break in skin integrity. The L foot is without obvious asymmetry or deformity when compared to the R foot. No bony step-off, no tenderness to palpation over midfoot, hindfoot or sole. + tenderness to palpation over distal aspect of left fifth toe with moderate soft tissue swelling and fluctuance at the base of the nail bed without induration, erythema, or warmth. Normal plantar/dorsiflexion, inversion/eversion. Distal motor and neurovascular status are intact.   Neurological: GCS 15. Alert and oriented to person, place, and time. No gross cranial nerve, light touch or strength deficit.   Skin: Skin is warm and dry.   EXT: 2+ DP pulses.  Psychiatric: Behavior is normal. Judgment normal.      Paronychia  I&D  Location: left fifth toe  Verbal consent obtained.  Area cleaned with alcohol.  Puncture incision made with 18 G needle inserted under the affected cuticle margin. Small amount of blood was expressed.  Hemostasis achieved with compression.  Wound dressed with dry, sterile dressing. Patient tolerated procedure well.    Assessment:       1. Paronychia of fifth toe of left foot          Plan:       VSS. On exam, she is afebrile and well appearing. Attempted I&D of suspected paronychia to left fifth toe, only a small amount of blood expressed. Mupirocin topical ointment applied to the area and wound dressed with dry, sterile dressing. Prescription sent to pharmacy for mupirocin, keflex, and Naproxen. Advised patient to soak toe in warm water TID. Referral placed to Podiatry should symptoms persist or worsen. Verbalized understanding and all questions answered.     Paronychia of fifth toe of left foot  -     mupirocin (BACTROBAN) 2 % ointment; Apply topically 3 (three) times daily. To affected area for 10 days  Dispense: 15 g; Refill: 0  -     cephALEXin (KEFLEX) 500 MG capsule; Take 1 capsule (500 mg total) by mouth 4 (four) times daily. for 5 days  Dispense: 30  capsule; Refill: 0  -     naproxen (NAPROSYN) 500 MG tablet; Take 1 tablet (500 mg total) by mouth 2 (two) times daily as needed (pain).  Dispense: 60 tablet; Refill: 0  -     Ambulatory referral/consult to Podiatry

## 2022-03-13 NOTE — PATIENT INSTRUCTIONS
You have been given an [facility] doctor referral. If you don't hear from them in a few days call [phone]

## 2022-05-20 ENCOUNTER — HOSPITAL ENCOUNTER (EMERGENCY)
Facility: HOSPITAL | Age: 52
Discharge: HOME | End: 2022-05-21
Attending: EMERGENCY MEDICINE
Payer: COMMERCIAL

## 2022-05-20 ENCOUNTER — OFFICE VISIT (OUTPATIENT)
Dept: URGENT CARE | Facility: CLINIC | Age: 52
End: 2022-05-20
Payer: COMMERCIAL

## 2022-05-20 VITALS
HEART RATE: 95 BPM | TEMPERATURE: 98 F | OXYGEN SATURATION: 99 % | BODY MASS INDEX: 25.3 KG/M2 | RESPIRATION RATE: 20 BRPM | HEIGHT: 61 IN | SYSTOLIC BLOOD PRESSURE: 174 MMHG | DIASTOLIC BLOOD PRESSURE: 108 MMHG | WEIGHT: 134 LBS

## 2022-05-20 DIAGNOSIS — R20.2 TINGLING: ICD-10-CM

## 2022-05-20 DIAGNOSIS — M79.10 MYALGIA: ICD-10-CM

## 2022-05-20 DIAGNOSIS — R20.2 TINGLING: Primary | ICD-10-CM

## 2022-05-20 DIAGNOSIS — I10 HYPERTENSION, UNSPECIFIED TYPE: ICD-10-CM

## 2022-05-20 DIAGNOSIS — M54.2 NECK PAIN: Primary | ICD-10-CM

## 2022-05-20 LAB
ALBUMIN SERPL BCP-MCNC: 4.5 G/DL (ref 3.5–5.2)
ALP SERPL-CCNC: 55 U/L (ref 55–135)
ALT SERPL W/O P-5'-P-CCNC: 16 U/L (ref 10–44)
ANION GAP SERPL CALC-SCNC: 9 MMOL/L (ref 8–16)
AST SERPL-CCNC: 20 U/L (ref 10–40)
B-HCG UR QL: NEGATIVE
BASOPHILS # BLD AUTO: 0.02 K/UL (ref 0–0.2)
BASOPHILS NFR BLD: 0.4 % (ref 0–1.9)
BILIRUB SERPL-MCNC: 0.7 MG/DL (ref 0.1–1)
BILIRUB UR QL STRIP: NEGATIVE
BILIRUB UR QL STRIP: NEGATIVE
BNP SERPL-MCNC: 12 PG/ML (ref 0–99)
BUN SERPL-MCNC: 14 MG/DL (ref 6–20)
CALCIUM SERPL-MCNC: 9 MG/DL (ref 8.7–10.5)
CHLORIDE SERPL-SCNC: 104 MMOL/L (ref 95–110)
CLARITY UR: CLEAR
CO2 SERPL-SCNC: 23 MMOL/L (ref 23–29)
COLOR UR: YELLOW
CREAT SERPL-MCNC: 0.7 MG/DL (ref 0.5–1.4)
CTP QC/QA: YES
DIFFERENTIAL METHOD: NORMAL
EOSINOPHIL # BLD AUTO: 0.1 K/UL (ref 0–0.5)
EOSINOPHIL NFR BLD: 1.4 % (ref 0–8)
ERYTHROCYTE [DISTWIDTH] IN BLOOD BY AUTOMATED COUNT: 12.3 % (ref 11.5–14.5)
EST. GFR  (AFRICAN AMERICAN): >60 ML/MIN/1.73 M^2
EST. GFR  (NON AFRICAN AMERICAN): >60 ML/MIN/1.73 M^2
GLUCOSE SERPL-MCNC: 91 MG/DL (ref 70–110)
GLUCOSE UR QL STRIP: NEGATIVE
GLUCOSE UR QL STRIP: NEGATIVE
HCT VFR BLD AUTO: 39.4 % (ref 37–48.5)
HGB BLD-MCNC: 13.3 G/DL (ref 12–16)
HGB UR QL STRIP: NEGATIVE
IMM GRANULOCYTES # BLD AUTO: 0 K/UL (ref 0–0.04)
IMM GRANULOCYTES NFR BLD AUTO: 0 % (ref 0–0.5)
KETONES UR QL STRIP: NEGATIVE
KETONES UR QL STRIP: NEGATIVE
LEUKOCYTE ESTERASE UR QL STRIP: NEGATIVE
LEUKOCYTE ESTERASE UR QL STRIP: NEGATIVE
LYMPHOCYTES # BLD AUTO: 1.6 K/UL (ref 1–4.8)
LYMPHOCYTES NFR BLD: 30.9 % (ref 18–48)
MAGNESIUM SERPL-MCNC: 1.9 MG/DL (ref 1.6–2.6)
MCH RBC QN AUTO: 29.8 PG (ref 27–31)
MCHC RBC AUTO-ENTMCNC: 33.8 G/DL (ref 32–36)
MCV RBC AUTO: 88 FL (ref 82–98)
MONOCYTES # BLD AUTO: 0.4 K/UL (ref 0.3–1)
MONOCYTES NFR BLD: 6.9 % (ref 4–15)
NEUTROPHILS # BLD AUTO: 3.1 K/UL (ref 1.8–7.7)
NEUTROPHILS NFR BLD: 60.4 % (ref 38–73)
NITRITE UR QL STRIP: NEGATIVE
NRBC BLD-RTO: 0 /100 WBC
PH UR STRIP: 7 [PH] (ref 5–8)
PH, POC UA: 5
PLATELET # BLD AUTO: 162 K/UL (ref 150–450)
PMV BLD AUTO: 12.9 FL (ref 9.2–12.9)
POC BLOOD, URINE: NEGATIVE
POC NITRATES, URINE: NEGATIVE
POTASSIUM SERPL-SCNC: 3.5 MMOL/L (ref 3.5–5.1)
PROT SERPL-MCNC: 7.5 G/DL (ref 6–8.4)
PROT UR QL STRIP: NEGATIVE
PROT UR QL STRIP: NEGATIVE
RBC # BLD AUTO: 4.47 M/UL (ref 4–5.4)
SODIUM SERPL-SCNC: 136 MMOL/L (ref 136–145)
SP GR UR STRIP: 1.02 (ref 1–1.03)
SP GR UR STRIP: 1.02 (ref 1–1.03)
TROPONIN I SERPL DL<=0.01 NG/ML-MCNC: <0.03 NG/ML
TROPONIN I SERPL DL<=0.01 NG/ML-MCNC: <0.03 NG/ML
TSH SERPL DL<=0.005 MIU/L-ACNC: 2.93 UIU/ML (ref 0.34–5.6)
URN SPEC COLLECT METH UR: NORMAL
UROBILINOGEN UR STRIP-ACNC: NEGATIVE EU/DL
UROBILINOGEN UR STRIP-ACNC: NORMAL (ref 0.1–1.1)
WBC # BLD AUTO: 5.05 K/UL (ref 3.9–12.7)

## 2022-05-20 PROCEDURE — 84443 ASSAY THYROID STIM HORMONE: CPT | Performed by: NURSE PRACTITIONER

## 2022-05-20 PROCEDURE — 81003 URINALYSIS AUTO W/O SCOPE: CPT | Performed by: NURSE PRACTITIONER

## 2022-05-20 PROCEDURE — 96365 THER/PROPH/DIAG IV INF INIT: CPT

## 2022-05-20 PROCEDURE — 99285 EMERGENCY DEPT VISIT HI MDM: CPT | Mod: 25

## 2022-05-20 PROCEDURE — 99213 PR OFFICE/OUTPT VISIT, EST, LEVL III, 20-29 MIN: ICD-10-PCS | Mod: S$GLB,,, | Performed by: NURSE PRACTITIONER

## 2022-05-20 PROCEDURE — 25000003 PHARM REV CODE 250: Performed by: STUDENT IN AN ORGANIZED HEALTH CARE EDUCATION/TRAINING PROGRAM

## 2022-05-20 PROCEDURE — 84484 ASSAY OF TROPONIN QUANT: CPT | Performed by: NURSE PRACTITIONER

## 2022-05-20 PROCEDURE — 80053 COMPREHEN METABOLIC PANEL: CPT | Performed by: NURSE PRACTITIONER

## 2022-05-20 PROCEDURE — 83735 ASSAY OF MAGNESIUM: CPT | Performed by: NURSE PRACTITIONER

## 2022-05-20 PROCEDURE — 81003 POCT URINALYSIS, DIPSTICK, AUTOMATED, W/O SCOPE: ICD-10-PCS | Mod: QW,S$GLB,, | Performed by: NURSE PRACTITIONER

## 2022-05-20 PROCEDURE — 99213 OFFICE O/P EST LOW 20 MIN: CPT | Mod: S$GLB,,, | Performed by: NURSE PRACTITIONER

## 2022-05-20 PROCEDURE — 87811 PR SARS-COV-2 COVID-19 IMMUNOASSAY W/DIRECT OPTICAL OBS: ICD-10-PCS | Mod: QW,S$GLB,, | Performed by: NURSE PRACTITIONER

## 2022-05-20 PROCEDURE — 93000 ELECTROCARDIOGRAM COMPLETE: CPT | Mod: S$GLB,,, | Performed by: NURSE PRACTITIONER

## 2022-05-20 PROCEDURE — 87804 PR  DETECT AGENT,IMMUN,DIR OBS,INFLUENZA: ICD-10-PCS | Mod: QW,59,S$GLB, | Performed by: NURSE PRACTITIONER

## 2022-05-20 PROCEDURE — 87811 SARS-COV-2 COVID19 W/OPTIC: CPT | Mod: QW,S$GLB,, | Performed by: NURSE PRACTITIONER

## 2022-05-20 PROCEDURE — 96375 TX/PRO/DX INJ NEW DRUG ADDON: CPT

## 2022-05-20 PROCEDURE — 63600175 PHARM REV CODE 636 W HCPCS: Performed by: STUDENT IN AN ORGANIZED HEALTH CARE EDUCATION/TRAINING PROGRAM

## 2022-05-20 PROCEDURE — 81003 URINALYSIS AUTO W/O SCOPE: CPT | Mod: QW,S$GLB,, | Performed by: NURSE PRACTITIONER

## 2022-05-20 PROCEDURE — 81025 URINE PREGNANCY TEST: CPT | Performed by: EMERGENCY MEDICINE

## 2022-05-20 PROCEDURE — 83880 ASSAY OF NATRIURETIC PEPTIDE: CPT | Performed by: NURSE PRACTITIONER

## 2022-05-20 PROCEDURE — 85025 COMPLETE CBC W/AUTO DIFF WBC: CPT | Performed by: NURSE PRACTITIONER

## 2022-05-20 PROCEDURE — 93000 PR ELECTROCARDIOGRAM, COMPLETE: ICD-10-PCS | Mod: S$GLB,,, | Performed by: NURSE PRACTITIONER

## 2022-05-20 PROCEDURE — 87804 INFLUENZA ASSAY W/OPTIC: CPT | Mod: QW,59,S$GLB, | Performed by: NURSE PRACTITIONER

## 2022-05-20 PROCEDURE — 84484 ASSAY OF TROPONIN QUANT: CPT | Mod: 91 | Performed by: STUDENT IN AN ORGANIZED HEALTH CARE EDUCATION/TRAINING PROGRAM

## 2022-05-20 RX ORDER — GABAPENTIN 300 MG/1
300 CAPSULE ORAL 3 TIMES DAILY
Qty: 90 CAPSULE | Refills: 0 | Status: SHIPPED | OUTPATIENT
Start: 2022-05-20 | End: 2022-06-28

## 2022-05-20 RX ORDER — AMLODIPINE BESYLATE 10 MG/1
10 TABLET ORAL DAILY
Qty: 30 TABLET | Refills: 2 | Status: SHIPPED | OUTPATIENT
Start: 2022-05-20 | End: 2022-10-12 | Stop reason: SDUPTHER

## 2022-05-20 RX ORDER — PHENTERMINE HYDROCHLORIDE 37.5 MG/1
37.5 TABLET ORAL
COMMUNITY
End: 2022-05-24

## 2022-05-20 RX ORDER — KETOROLAC TROMETHAMINE 30 MG/ML
15 INJECTION, SOLUTION INTRAMUSCULAR; INTRAVENOUS
Status: DISCONTINUED | OUTPATIENT
Start: 2022-05-20 | End: 2022-05-20

## 2022-05-20 RX ORDER — AMLODIPINE BESYLATE 10 MG/1
10 TABLET ORAL DAILY
Qty: 30 TABLET | Refills: 2 | Status: SHIPPED | OUTPATIENT
Start: 2022-05-20 | End: 2022-05-20 | Stop reason: SDUPTHER

## 2022-05-20 RX ORDER — KETOROLAC TROMETHAMINE 30 MG/ML
15 INJECTION, SOLUTION INTRAMUSCULAR; INTRAVENOUS
Status: COMPLETED | OUTPATIENT
Start: 2022-05-20 | End: 2022-05-20

## 2022-05-20 RX ORDER — LATANOPROST 50 UG/ML
1 SOLUTION/ DROPS OPHTHALMIC DAILY
COMMUNITY
Start: 2022-01-18 | End: 2022-05-24

## 2022-05-20 RX ORDER — DICLOFENAC SODIUM 10 MG/G
2 GEL TOPICAL 4 TIMES DAILY
Qty: 20 G | Refills: 0 | Status: SHIPPED | OUTPATIENT
Start: 2022-05-20 | End: 2022-06-28

## 2022-05-20 RX ADMIN — METHOCARBAMOL 1000 MG: 100 INJECTION INTRAMUSCULAR; INTRAVENOUS at 10:05

## 2022-05-20 RX ADMIN — KETOROLAC TROMETHAMINE 15 MG: 30 INJECTION, SOLUTION INTRAMUSCULAR; INTRAVENOUS at 10:05

## 2022-05-20 NOTE — PROGRESS NOTES
"Subjective:       Patient ID: Jada Fuchs is a 52 y.o. female.    Vitals:  height is 5' 1" (1.549 m) and weight is 60.8 kg (134 lb). Her temperature is 98.2 °F (36.8 °C). Her blood pressure is 174/108 (abnormal) and her pulse is 95. Her respiration is 20 and oxygen saturation is 99%.     Chief Complaint: Tingling    Pt. States she's been experiencing tingling, aching, and pain all over her body, pt. Compares feeling to "foot falling asleep" except she feels this all over, pt. States this began Wednesday.  pt. Also states she's experiencing a pulsating pain on the left side of her head, pain does not radiate to the right side. Pt. Also wants to note that she began a new appetite medication Sunday and stopped Wednesday and is unsure if medication is the reason for body tingling.     Past Medical History:  No date: Abdominal pain  No date: Anemia      Comment:  probably secondary to fibroid  No date: Blood transfusion  No date: History of uterine fibroid  No date: Hypothyroidism  No date: Wears glasses      Comment:  CONTACS    Past Surgical History:  No date: BREAST BIOPSY; Left      Comment:  EXC  No date: BREAST BIOPSY; Left      Comment:  NEEDLE  No date: BREAST SURGERY      Comment:  mass removed  2015: HYSTERECTOMY    Review of patient's family history indicates:  Problem: Cancer      Relation: Maternal Grandfather          Age of Onset: (Not Specified)          Comment: prostate  Problem: Hypertension      Relation: Mother          Age of Onset: (Not Specified)  Problem: Seizures      Relation: Maternal Grandmother          Age of Onset: (Not Specified)  Problem: Aneurysm      Relation: Father          Age of Onset: (Not Specified)  Problem: Eczema      Relation: Sister          Age of Onset: (Not Specified)  Problem: Melanoma      Relation: Neg Hx          Age of Onset: (Not Specified)      Social History    Socioeconomic History      Marital status:       Number of children: 1    Occupational " History      Occupation: case management        Comment: [employer]    Tobacco Use      Smoking status: Never Smoker      Smokeless tobacco: Never Used    Substance and Sexual Activity      Alcohol use: Yes        Comment: OCC      Drug use: No      Current Outpatient Medications:  hydroCHLOROthiazide (HYDRODIURIL) 12.5 MG Tab, Take 1/2 daily (Patient not taking: Reported on 3/13/2022), Disp: 45 tablet, Rfl: 3  naproxen (NAPROSYN) 500 MG tablet, Take 1 tablet (500 mg total) by mouth 2 (two) times daily as needed (pain)., Disp: 60 tablet, Rfl: 0    No current facility-administered medications for this visit.      Review of patient's allergies indicates:   -- Lisinopril     --  UNDESIRABLE SIDE EFFECT      Constitution: Positive for fatigue. Negative for fever.   HENT: Positive for ear pain. Negative for ear discharge, foreign body in ear, hearing loss, congestion, postnasal drip, sinus pain, sinus pressure, sore throat, trouble swallowing and voice change.    Neck: Positive for neck pain. Negative for neck swelling.   Cardiovascular: Negative.  Negative for chest trauma, chest pain and sob on exertion.   Respiratory: Negative.  Negative for cough, sputum production and wheezing.    Gastrointestinal: Negative for abdominal pain, nausea, vomiting, constipation, diarrhea and heartburn.   Genitourinary: Negative.    Musculoskeletal: Positive for pain and muscle ache. Negative for trauma.   Skin: Negative for rash and erythema.   Neurological: Positive for tingling. Negative for dizziness, light-headedness, numbness and tremors.   Psychiatric/Behavioral: Positive for nervous/anxious. The patient is nervous/anxious.        Objective:      Physical Exam   Constitutional: She is oriented to person, place, and time.  Non-toxic appearance. She does not appear ill. No distress.   HENT:   Head: Normocephalic and atraumatic.   Neck:     No neck rigidity present. No decreased range of motion present. pain with movement  present. No spinous process tenderness present. muscular tenderness present.   Cardiovascular: Normal rate and regular rhythm.   No murmur heard.  Pulmonary/Chest: Effort normal and breath sounds normal. No respiratory distress.   Abdominal: Normal appearance and bowel sounds are normal. There is no abdominal tenderness.   Musculoskeletal:      Cervical back: She exhibits tenderness.      Right lower leg: No edema.      Left lower leg: No edema.   Neurological: no focal deficit. She is alert and oriented to person, place, and time.   Skin: Skin is no rash. No erythema   Psychiatric: Her behavior is normal. Her mood appears anxious.         Assessment:       1. Tingling    2. Myalgia          Plan:       Phentermine was stopped Wednesday, symptoms are not improving. The neck pain is likely muscular, but the numbness and tingling all over her body, sensation of pressure in her head,  accompanied by significantly elevated BP (rechecked manually several times) is concerning. I have limited means of evaluating patient in this setting. I have advised her to go to [facility] for evaluation,  will transport by private vehicle.   Tingling  -     SARS Coronavirus 2 Antigen, POCT Manual Read  -     POCT Influenza A/B  -     POCT Urinalysis, Dipstick, Automated, W/O Scope  -     IN OFFICE EKG 12-LEAD (to [place])    Myalgia  -     SARS Coronavirus 2 Antigen, POCT Manual Read  -     POCT Influenza A/B  -     POCT Urinalysis, Dipstick, Automated, W/O Scope  -     IN OFFICE EKG 12-LEAD (to [place])

## 2022-05-21 VITALS
BODY MASS INDEX: 25.3 KG/M2 | RESPIRATION RATE: 18 BRPM | SYSTOLIC BLOOD PRESSURE: 160 MMHG | HEART RATE: 87 BPM | HEIGHT: 61 IN | WEIGHT: 134 LBS | TEMPERATURE: 98 F | OXYGEN SATURATION: 100 % | DIASTOLIC BLOOD PRESSURE: 99 MMHG

## 2022-05-21 NOTE — ED NOTES
Pt here with c/o left shoulder pain and tingling in the hands and feet. Pt states it has happened before but its different this time. This started on Wednesday. Pt started a appetite Supressant and wasn't sure if it was related and had stopped it.

## 2022-05-21 NOTE — DISCHARGE INSTRUCTIONS
Please follow-up with their primary care provider.  Please take your new blood pressure medicine as prescribed.  Please return to the ER if you develop any lightheadedness palpitations, passing out, nausea, vomiting, fevers or any other symptoms concerning to you.

## 2022-05-21 NOTE — FIRST PROVIDER EVALUATION
" Emergency Department TeleTriage Encounter Note      CHIEF COMPLAINT    Chief Complaint   Patient presents with    Hypertension     Sent by TROY Whitlock     Bilat arm, hand, feet tingling x3 days    Neck Pain     Left       VITAL SIGNS   Initial Vitals [05/20/22 1859]   BP Pulse Resp Temp SpO2   (!) 203/127 96 16 97.9 °F (36.6 °C) 98 %      MAP       --            ALLERGIES    Review of patient's allergies indicates:   Allergen Reactions    Lisinopril      UNDESIRABLE SIDE EFFECT       PROVIDER TRIAGE NOTE  This is a teletriage evaluation of a 52 y.o. female presenting to the ED complaining of tingling and pain to left side neck, shoulder, and LUE for three days.  Pt had headache on Wednesday that resolved.  Denies CP and SOB.  Has also noted "tingling" in her feet.  Pt started an appetite supressant on Sunday and took it for three days prior to the onset of symptoms.    Initial orders will be placed and care will be transferred to an alternate provider when patient is roomed for a full evaluation. Any additional orders and the final disposition will be determined by that provider.           ORDERS  Labs Reviewed   CBC W/ AUTO DIFFERENTIAL   COMPREHENSIVE METABOLIC PANEL   TSH   TROPONIN I   URINALYSIS, REFLEX TO URINE CULTURE   MAGNESIUM   B-TYPE NATRIURETIC PEPTIDE       ED Orders (720h ago, onward)    Start Ordered     Status Ordering Provider    05/20/22 2000 05/20/22 1913  Vital Signs  Every 2 hours         Ordered IMELDA LLOYD N.    05/20/22 1914 05/20/22 1913  CBC auto differential  STAT         Ordered IMELDA LLOYD N.    05/20/22 1914 05/20/22 1913  Comprehensive metabolic panel  STAT         Ordered IMELDA LLOYD N.    05/20/22 1914 05/20/22 1913  Insert Saline lock IV  Once         Ordered IMELDA LLOYD N.    05/20/22 1914 05/20/22 1913  EKG 12-lead  Once         Ordered IMELDA LLOYD N.    05/20/22 1914 05/20/22 1913  TSH  STAT         Ordered " PREMA, IMELDA N.    05/20/22 1914 05/20/22 1913  Troponin I  STAT         Ordered SCHEL BARBERIKA N.    05/20/22 1914 05/20/22 1913  Urinalysis, Reflex to Urine Culture Urine, Clean Catch  STAT         Ordered SCHIMELDA BARBER N.    05/20/22 1914 05/20/22 1913  Magnesium  STAT         Ordered SCHOTTELJOSE RTTE, IMELDA N.    05/20/22 1914 05/20/22 1913  Cardiac Monitoring - Adult  Continuous        Comments: Notify Physician If:    Ordered MAAMEOTTAURELIAE, IMELDA N.    05/20/22 1914 05/20/22 1913  Pulse Oximetry Continuous  Continuous         Ordered IMELDA LLOYD N.    05/20/22 1914 05/20/22 1913  Brain natriuretic peptide  STAT         Ordered IMELDA LLOYD N.    05/20/22 1914 05/20/22 1914  X-Ray Chest AP Portable  1 time imaging         Ordered IMELDA LLOYD N.            Virtual Visit Note: The provider triage portion of this emergency department evaluation and documentation was performed via [employer], a HIPAA-compliant telemedicine application, in concert with a tele-presenter in the room. A face to face patient evaluation with one of my colleagues will occur once the patient is placed in an emergency department room.      DISCLAIMER: This note was prepared with [employer] voice recognition transcription software. Garbled syntax, mangled pronouns, and other bizarre constructions may be attributed to that software system.   Yes

## 2022-05-21 NOTE — ED PROVIDER NOTES
Encounter Date: 5/20/2022       History     Chief Complaint   Patient presents with    Hypertension     Sent by TROY Magana arm, hand, feet tingling x3 days    Neck Pain     Left     52-year-old female presents to the for worsening of her neuropathy.  She stated that on Wednesday she developed pain in bilateral hands and bilateral feet.  Pain has been constant.  She also states that the pain is tracking up her arms and legs.  This is different from her chronic neuropathy as it is usually a pins and needle sensation that is localized to her hands and feet.  She also reports concomitant left neck pain radiating down to her left shoulder.  She says the pain is worse with range of motion was improved by Robaxin.  She was seen in urgent care today who advised her to come to the ER for evaluation.        Review of patient's allergies indicates:   Allergen Reactions    Lisinopril      UNDESIRABLE SIDE EFFECT     Past Medical History:   Diagnosis Date    Abdominal pain     Anemia     probably secondary to fibroid    Blood transfusion     History of uterine fibroid     Hypothyroidism     Wears glasses     CONTACS     Past Surgical History:   Procedure Laterality Date    BREAST BIOPSY Left     EXC    BREAST BIOPSY Left     NEEDLE    BREAST SURGERY      mass removed    HYSTERECTOMY  2015     Family History   Problem Relation Age of Onset    Cancer Maternal Grandfather         prostate    Hypertension Mother     Seizures Maternal Grandmother     Aneurysm Father     Eczema Sister     Melanoma Neg Hx      Social History     Tobacco Use    Smoking status: Never Smoker    Smokeless tobacco: Never Used   Substance Use Topics    Alcohol use: Yes     Comment: OCC    Drug use: No     Review of Systems   Constitutional: Negative for chills and fever.   HENT: Negative for rhinorrhea and sore throat.    Eyes: Negative for discharge and redness.   Respiratory: Negative for cough and shortness of breath.     Cardiovascular: Negative for chest pain and palpitations.   Gastrointestinal: Negative for abdominal pain, diarrhea, nausea and vomiting.   Endocrine: Negative for polyuria.   Genitourinary: Negative for flank pain and frequency.   Musculoskeletal: Positive for myalgias and neck stiffness. Negative for back pain.   Skin: Negative for rash.   Neurological: Negative for dizziness, syncope, facial asymmetry, speech difficulty, weakness and light-headedness.       Physical Exam     Initial Vitals [05/20/22 1859]   BP Pulse Resp Temp SpO2   (!) 203/127 96 16 97.9 °F (36.6 °C) 98 %      MAP       --         Physical Exam    Nursing note and vitals reviewed.  Constitutional: She appears well-developed and well-nourished. No distress.   HENT:   Head: Normocephalic and atraumatic.   Right Ear: External ear normal.   Left Ear: External ear normal.   Nose: Nose normal.   Eyes: EOM are normal. Pupils are equal, round, and reactive to light. Right eye exhibits no discharge. Left eye exhibits no discharge.   Neck: Neck supple.   Normal range of motion.  Cardiovascular: Normal rate, regular rhythm, normal heart sounds and intact distal pulses.   Pulmonary/Chest: Breath sounds normal. No respiratory distress.   Abdominal: Abdomen is soft. Bowel sounds are normal. She exhibits no distension. There is no abdominal tenderness.   Musculoskeletal:         General: Tenderness (Left trapezius tenderness to palpation no overlying skin change no crepitus no fluctuance no induration) present. Normal range of motion.      Cervical back: Normal range of motion and neck supple.     Neurological: She is alert and oriented to person, place, and time. She has normal strength. No sensory deficit. GCS score is 15. GCS eye subscore is 4. GCS verbal subscore is 5. GCS motor subscore is 6.   5/5 motor in upper and lower extremities.  Sensation intact to soft touch in upper and lower extremities.  Finger-to-nose heel-to-shin normal.  Extraocular  motions intact no drift no facial asymmetry   Skin: Skin is warm. Capillary refill takes less than 2 seconds. No rash noted.         ED Course   Procedures  Labs Reviewed   CBC W/ AUTO DIFFERENTIAL    Narrative:     Recoll. [id number] by [facility] at 05/20/2022 20:01, reason: Specimen   clotted; notified ER @ 20:01   COMPREHENSIVE METABOLIC PANEL    Narrative:     Recoll. [id number] by [facility] at 05/20/2022 19:44, reason: hemolyzed   TSH    Narrative:     Recoll. [id number] by [facility] at 05/20/2022 19:44, reason: hemolyzed   TROPONIN I    Narrative:     Recoll. [id number] by [facility] at 05/20/2022 19:44, reason: hemolyzed   URINALYSIS, REFLEX TO URINE CULTURE    Narrative:     Specimen Source->Urine   MAGNESIUM    Narrative:     Recoll. [id number] by [facility] at 05/20/2022 19:44, reason: hemolyzed   B-TYPE NATRIURETIC PEPTIDE    Narrative:     Recoll. [id number] by [facility] at 05/20/2022 20:01, reason: Specimen   clotted; notified ER @ 20:01   TROPONIN I   POCT URINE PREGNANCY     EKG Readings: (Independently Interpreted)   Initial Reading: No STEMI. Previous EKG: Compared with most recent EKG Rhythm: Normal Sinus Rhythm. Ectopy: No Ectopy. Conduction: Normal. ST Segments: Normal ST Segments.       Imaging Results          CT Head Without Contrast (Final result)  Result time 05/20/22 21:03:43    Final result by Jaqueline Segura MD (05/20/22 21:03:43)                 Narrative:    EXAM DESCRIPTION:    CT HEAD WITHOUT CONTRAST    CLINICAL HISTORY:  Headache, new or worsening (Age >= 50y)    TECHNIQUE:    5mm slice thickness axial images through the brain were performed in bone and soft tissue windows in the absence of intravenous contrast.  This exam was performed according to our departmental dose-optimization program which includes use of Automated Exposure Control, adjustment of the mA and/or kV according to patient size and/or use of iterative reconstruction technique.    COMPARISON:    None.    FINDINGS:    The brain parenchyma appears  unremarkable. There is no intra-axial or extra-axial bleed seen. There is no mass or mass effect. The ventricles are normal in size shape and configuration. The orbital contents appear unremarkable.    The visualized paranasal sinuses and mastoid air cells are patent. No fracture is present.    IMPRESSION:    No acute intracranial abnormality identified.    Electronically signed by:  Jaqueline Chávez MD, CARLITO  5/20/2022 9:03 PM CDT Workstation: [id number]                             X-Ray Chest AP Portable (Final result)  Result time 05/20/22 19:57:42    Final result by Hardik Noel MD (05/20/22 19:57:42)                 Narrative:    CLINICAL HISTORY:  52 years (1970) Female elevated blood pressure Hypertension; Tingling; Neck Pain; elevated blood pressure    TECHNIQUE:  Portable AP radiograph the chest.    COMPARISON:  None available.    FINDINGS:  The lungs are clear. Costophrenic angles are seen without effusion. No pneumothorax is identified. The heart is normal in size. The mediastinum is within normal limits. Osseous structures appear within normal limits. The visualized upper abdomen is unremarkable.    IMPRESSION:  No acute cardiac or pulmonary process.                  .            Electronically signed by:  Hardik Noel MD  5/20/2022 7:57 PM CDT Workstation: [id number]                               Medications   methocarbamoL (ROBAXIN) 1,000 mg in dextrose 5 % 100 mL IVPB (1,000 mg Intravenous New Bag 5/20/22 2231)   ketorolac injection 15 mg (15 mg Intravenous Given 5/20/22 2200)     Medical Decision Making:   Initial Assessment:   52-year-old female presents to the ER for pain and burning to her bilateral hands and feet as well as left-sided neck pain.  Patient he appears uncomfortable on physical examination his sounds are clear abdomen is soft in her cardiovascular exam is normal.  Neurological exam is normal.   Clinical Tests:   Lab Tests: Reviewed and Ordered  Radiological Study:  "Ordered and Reviewed  Medical Tests: Ordered and Reviewed  ED Management:  PGY  4 MDM :   53 y/o female who presents to the ER for neuropathy well as left-sided neck pain.  Her neurological exam is nonfocal.  She does report left-sided head pain in addition to the left-sided muscle spasm.  Obtained a non-con CT imaging showed no intra or extra-axial hemorrhage no mass no evidence of any stroke.  Patient receive Robaxin which minimally helped however he then received IV Toradol which favors significant relief.  Cardiac evaluation including EKG chest x-ray and troponin x2 are all within normal limits.  Patient was initially hypertensive however blood pressure did improve with her symptomatic treatment.  She is on HCTZ however she does not like to take the medicine so I started her on a low dose of Norvasc here also prescribed her gabapentin for her neuropathy.  She is also given a prescription for diclofenac cream for her left-sided muscle pain.  She has a normal renal function.  Contusion instructed follow-up with her primary care provider next week.  Discharged return precautions were discussed and verbalized understanding.  Patient is stable for discharge.     Flex Chacon MD PGY4  "5/21/2022" 0015                           Clinical Impression:   Final diagnoses:  [R20.2] Tingling  [M54.2] Neck pain (Primary)  [I10] Hypertension, unspecified type          ED Disposition Condition    Discharge Stable        ED Prescriptions     Medication Sig Dispense Start Date End Date Auth. Provider    amLODIPine (NORVASC) 10 MG tablet  (Status: Discontinued) Take 1 tablet (10 mg total) by mouth once daily. 30 tablet 5/20/2022 5/20/2022 Flex Chacon MD    diclofenac sodium (VOLTAREN) 1 % Gel Apply 2 g topically 4 (four) times daily. 20 g 5/20/2022  Flex Chacon MD    amLODIPine (NORVASC) 10 MG tablet Take 1 tablet (10 mg total) by mouth once daily. 30 tablet 5/20/2022 5/20/2023 Flex Chacon MD    gabapentin " (NEURONTIN) 300 MG capsule Take 1 capsule (300 mg total) by mouth 3 (three) times daily. 90 capsule 5/20/2022 5/20/2023 Flex Chacon MD        Follow-up Information     Follow up With Specialties Details Why Contact Info Additional Information    Dary Ace MD Internal Medicine Call on 5/23/2022 for recheck [address]  [facility] [address]  [phone]       [facility] - Emergency Dept Emergency Medicine Go to  If symptoms worsen [address]  [facility] [address]  [phone] 1st floor           Flex Chacon MD  Resident  05/21/22 0015

## 2022-05-23 NOTE — PROGRESS NOTES
"  SUBJECTIVE:    Patient ID: Jada Fuchs is a 52 y.o. female.    Chief Complaint: ER follow up (Htn and nerve pain) and Hypertension    HPI     In for follow-up labs:  Hemoglobin 13.3 hematocrit 39.4 WBC 5.05 platelets [id number]    ER report "ED Management:  PGY  4 MDM :   53 y/o female who presents to the ER for neuropathy well as left-sided neck pain.  Her neurological exam is nonfocal.  She does report left-sided head pain in addition to the left-sided muscle spasm.  Obtained a non-con CT imaging showed no intra or extra-axial hemorrhage no mass no evidence of any stroke.  Patient receive Robaxin which minimally helped however he then received IV Toradol which favors significant relief.  Cardiac evaluation including EKG chest x-ray and troponin x2 are all within normal limits.  Patient was initially hypertensive however blood pressure did improve with her symptomatic treatment.  She is on HCTZ however she does not like to take the medicine so I started her on a low dose of Norvasc here also prescribed her gabapentin for her neuropathy.  She is also given a prescription for diclofenac cream for her left-sided muscle pain.  She has a normal renal function.  Contusion instructed follow-up with her primary care provider next week.  Discharged return precautions were discussed and verbalized understanding.  Patient is stable for discharge. "    Patient feels it was caused by adderall from the weight clinic and had taken two dose, each a day apart-BP was good before she took the medication-she was given amlodipine and gabapentin-she is no longer having the tingling constantly, just intermittently-    Otherwise she is back to herself-there has been no more nerve pain but she has had that nerve pain not letting her sleep-tingling and pain in hands and feet and it is constant and may last 2-3 days-comes on suddenly and goes away suddenly-she has never taken her BP with such episodes-no headaches-no " nausea-      Admission on 05/20/2022, Discharged on 05/21/2022   Component Date Value Ref Range Status    WBC 05/20/2022 5.05  3.90 - 12.70 K/uL Final    RBC 05/20/2022 4.47  4.00 - 5.40 M/uL Final    Hemoglobin 05/20/2022 13.3  12.0 - 16.0 g/dL Final    Hematocrit 05/20/2022 39.4  37.0 - 48.5 % Final    MCV 05/20/2022 88  82 - 98 fL Final    MCH 05/20/2022 29.8  27.0 - 31.0 pg Final    MCHC 05/20/2022 33.8  32.0 - 36.0 g/dL Final    RDW 05/20/2022 12.3  11.5 - 14.5 % Final    Platelets 05/20/2022 162  150 - 450 K/uL Final    MPV 05/20/2022 12.9  9.2 - 12.9 fL Final    Immature Granulocytes 05/20/2022 0.0  0.0 - 0.5 % Final    Gran # (ANC) 05/20/2022 3.1  1.8 - 7.7 K/uL Final    Immature Grans (Abs) 05/20/2022 0.00  0.00 - 0.04 K/uL Final    Lymph # 05/20/2022 1.6  1.0 - 4.8 K/uL Final    Mono # 05/20/2022 0.4  0.3 - 1.0 K/uL Final    Eos # 05/20/2022 0.1  0.0 - 0.5 K/uL Final    Baso # 05/20/2022 0.02  0.00 - 0.20 K/uL Final    nRBC 05/20/2022 0  0 /100 WBC Final    Gran % 05/20/2022 60.4  38.0 - 73.0 % Final    Lymph % 05/20/2022 30.9  18.0 - 48.0 % Final    Mono % 05/20/2022 6.9  4.0 - 15.0 % Final    Eosinophil % 05/20/2022 1.4  0.0 - 8.0 % Final    Basophil % 05/20/2022 0.4  0.0 - 1.9 % Final    Differential Method 05/20/2022 Automated   Final    Sodium 05/20/2022 136  136 - 145 mmol/L Final    Potassium 05/20/2022 3.5  3.5 - 5.1 mmol/L Final    Chloride 05/20/2022 104  95 - 110 mmol/L Final    CO2 05/20/2022 23  23 - 29 mmol/L Final    Glucose 05/20/2022 91  70 - 110 mg/dL Final    BUN 05/20/2022 14  6 - 20 mg/dL Final    Creatinine 05/20/2022 0.7  0.5 - 1.4 mg/dL Final    Calcium 05/20/2022 9.0  8.7 - 10.5 mg/dL Final    Total Protein 05/20/2022 7.5  6.0 - 8.4 g/dL Final    Albumin 05/20/2022 4.5  3.5 - 5.2 g/dL Final    Total Bilirubin 05/20/2022 0.7  0.1 - 1.0 mg/dL Final    Alkaline Phosphatase 05/20/2022 55  55 - 135 U/L Final    AST 05/20/2022 20  10 - 40 U/L Final     ALT 05/20/2022 16  10 - 44 U/L Final    Anion Gap 05/20/2022 9  8 - 16 mmol/L Final    eGFR if African American 05/20/2022 >60.0  >60 mL/min/1.73 m^2 Final    eGFR if non African American 05/20/2022 >60.0  >60 mL/min/1.73 m^2 Final    TSH 05/20/2022 2.930  0.340 - 5.600 uIU/mL Final    Troponin I 05/20/2022 <0.030  <=0.040 ng/mL Final    Specimen UA 05/20/2022 Urine, Clean Catch   Final    Color, UA 05/20/2022 Yellow  Yellow, Straw, Caterina Final    Appearance, UA 05/20/2022 Clear  Clear Final    pH, UA 05/20/2022 7.0  5.0 - 8.0 Final    Specific Gravity, UA 05/20/2022 1.025  1.005 - 1.030 Final    Protein, UA 05/20/2022 Negative  Negative Final    Glucose, UA 05/20/2022 Negative  Negative Final    Ketones, UA 05/20/2022 Negative  Negative Final    Bilirubin (UA) 05/20/2022 Negative  Negative Final    Occult Blood UA 05/20/2022 Negative  Negative Final    Nitrite, UA 05/20/2022 Negative  Negative Final    Urobilinogen, UA 05/20/2022 Negative  Negative EU/dL Final    Leukocytes, UA 05/20/2022 Negative  Negative Final    Magnesium 05/20/2022 1.9  1.6 - 2.6 mg/dL Final    BNP 05/20/2022 12  0 - 99 pg/mL Final    Troponin I 05/20/2022 <0.030  <=0.040 ng/mL Final    POC Preg Test, Ur 05/20/2022 Negative  Negative Final     Acceptable 05/20/2022 Yes   Final   Office Visit on 05/20/2022   Component Date Value Ref Range Status    POC Blood, Urine 05/20/2022 Negative  Negative Final    POC Bilirubin, Urine 05/20/2022 Negative  Negative Final    POC Urobilinogen, Urine 05/20/2022 normal  0.1 - 1.1 Final    POC Ketones, Urine 05/20/2022 Negative  Negative Final    POC Protein, Urine 05/20/2022 Negative  Negative Final    POC Nitrates, Urine 05/20/2022 Negative  Negative Final    POC Glucose, Urine 05/20/2022 Negative  Negative Final    pH, UA 05/20/2022 5.0   Final    POC Specific Gravity, Urine 05/20/2022 1.025  1.003 - 1.029 Final    POC Leukocytes, Urine 05/20/2022 Negative   Negative Final   Lab Visit on 12/09/2021   Component Date Value Ref Range Status    WBC 12/09/2021 3.37 (A) 3.90 - 12.70 K/uL Final    RBC 12/09/2021 4.18  4.00 - 5.40 M/uL Final    Hemoglobin 12/09/2021 12.6  12.0 - 16.0 g/dL Final    Hematocrit 12/09/2021 38.8  37.0 - 48.5 % Final    MCV 12/09/2021 93  82 - 98 fL Final    MCH 12/09/2021 30.1  27.0 - 31.0 pg Final    MCHC 12/09/2021 32.5  32.0 - 36.0 g/dL Final    RDW 12/09/2021 12.7  11.5 - 14.5 % Final    Platelets 12/09/2021 156  150 - 450 K/uL Final    MPV 12/09/2021 12.5  9.2 - 12.9 fL Final    Immature Granulocytes 12/09/2021 0.3  0.0 - 0.5 % Final    Gran # (ANC) 12/09/2021 1.8  1.8 - 7.7 K/uL Final    Immature Grans (Abs) 12/09/2021 0.01  0.00 - 0.04 K/uL Final    Lymph # 12/09/2021 1.3  1.0 - 4.8 K/uL Final    Mono # 12/09/2021 0.3  0.3 - 1.0 K/uL Final    Eos # 12/09/2021 0.1  0.0 - 0.5 K/uL Final    Baso # 12/09/2021 0.01  0.00 - 0.20 K/uL Final    nRBC 12/09/2021 0  0 /100 WBC Final    Gran % 12/09/2021 51.9  38.0 - 73.0 % Final    Lymph % 12/09/2021 37.4  18.0 - 48.0 % Final    Mono % 12/09/2021 8.6  4.0 - 15.0 % Final    Eosinophil % 12/09/2021 1.5  0.0 - 8.0 % Final    Basophil % 12/09/2021 0.3  0.0 - 1.9 % Final    Differential Method 12/09/2021 Automated   Final    Sodium 12/09/2021 139  136 - 145 mmol/L Final    Potassium 12/09/2021 3.9  3.5 - 5.1 mmol/L Final    Chloride 12/09/2021 104  95 - 110 mmol/L Final    CO2 12/09/2021 28  23 - 29 mmol/L Final    Glucose 12/09/2021 86  70 - 110 mg/dL Final    BUN 12/09/2021 14  6 - 20 mg/dL Final    Creatinine 12/09/2021 0.8  0.5 - 1.4 mg/dL Final    Calcium 12/09/2021 8.9  8.7 - 10.5 mg/dL Final    Total Protein 12/09/2021 7.3  6.0 - 8.4 g/dL Final    Albumin 12/09/2021 4.2  3.5 - 5.2 g/dL Final    Total Bilirubin 12/09/2021 0.8  0.1 - 1.0 mg/dL Final    Alkaline Phosphatase 12/09/2021 47 (A) 55 - 135 U/L Final    AST 12/09/2021 24  10 - 40 U/L Final    ALT 12/09/2021  21  10 - 44 U/L Final    Anion Gap 12/09/2021 7 (A) 8 - 16 mmol/L Final    eGFR if African American 12/09/2021 >60.0  >60 mL/min/1.73 m^2 Final    eGFR if non African American 12/09/2021 >60.0  >60 mL/min/1.73 m^2 Final    Cholesterol 12/09/2021 189  120 - 199 mg/dL Final    Triglycerides 12/09/2021 36  30 - 150 mg/dL Final    HDL 12/09/2021 90 (A) 40 - 75 mg/dL Final    LDL Cholesterol 12/09/2021 91.8  63.0 - 159.0 mg/dL Final    HDL/Cholesterol Ratio 12/09/2021 47.6  20.0 - 50.0 % Final    Total Cholesterol/HDL Ratio 12/09/2021 2.1  2.0 - 5.0 Final    Non-HDL Cholesterol 12/09/2021 99  mg/dL Final    Vitamin B-12 12/09/2021 569  210 - 950 pg/mL Final    Vitamin B6 12/09/2021 51.2 (A) 2.0 - 32.8 ug/L Final       Past Medical History:   Diagnosis Date    Abdominal pain     Anemia     probably secondary to fibroid    Blood transfusion     History of uterine fibroid     Hypothyroidism     Wears glasses     CONTACS     Past Surgical History:   Procedure Laterality Date    BREAST BIOPSY Left     EXC    BREAST BIOPSY Left     NEEDLE    BREAST SURGERY      mass removed    HYSTERECTOMY  2015     Family History   Problem Relation Age of Onset    Cancer Maternal Grandfather         prostate    Hypertension Mother     Seizures Maternal Grandmother     Aneurysm Father     Eczema Sister     Melanoma Neg Hx        Marital Status:   Alcohol History:  reports current alcohol use.  Tobacco History:  reports that she has never smoked. She has never used smokeless tobacco.  Drug History:  reports no history of drug use.    Review of patient's allergies indicates:   Allergen Reactions    Adderall [dextroamphetamine-amphetamine]      Severe sx hypertension    Lisinopril      UNDESIRABLE SIDE EFFECT       Current Outpatient Medications:     amLODIPine (NORVASC) 10 MG tablet, Take 1 tablet (10 mg total) by mouth once daily., Disp: 30 tablet, Rfl: 2    diclofenac sodium (VOLTAREN) 1 % Gel, Apply 2 g  topically 4 (four) times daily. (Patient not taking: Reported on 5/24/2022), Disp: 20 g, Rfl: 0    gabapentin (NEURONTIN) 300 MG capsule, Take 1 capsule (300 mg total) by mouth 3 (three) times daily. (Patient not taking: Reported on 5/24/2022), Disp: 90 capsule, Rfl: 0    naproxen (NAPROSYN) 500 MG tablet, Take 1 tablet (500 mg total) by mouth 2 (two) times daily as needed (pain). (Patient not taking: Reported on 5/24/2022), Disp: 60 tablet, Rfl: 0    Review of Systems   Constitutional: Negative for appetite change, chills, diaphoresis, fatigue, fever and unexpected weight change.   HENT: Negative for congestion, ear pain, hearing loss, nosebleeds, postnasal drip, sinus pressure, sinus pain, sneezing, sore throat, tinnitus, trouble swallowing and voice change.    Eyes: Negative for photophobia, pain, itching and visual disturbance.   Respiratory: Negative for apnea, cough, chest tightness, shortness of breath, wheezing and stridor.    Cardiovascular: Negative for chest pain, palpitations and leg swelling.   Gastrointestinal: Negative for abdominal distention, abdominal pain, blood in stool, constipation, diarrhea, nausea and vomiting.   Endocrine: Negative for cold intolerance, heat intolerance, polydipsia and polyuria.   Genitourinary: Negative for difficulty urinating, dyspareunia, dysuria, flank pain, frequency, hematuria, menstrual problem, pelvic pain, urgency, vaginal discharge and vaginal pain.   Musculoskeletal: Negative for arthralgias, back pain, joint swelling, myalgias, neck pain and neck stiffness.        HPI   Skin: Negative for pallor.   Allergic/Immunologic: Negative for environmental allergies and food allergies.   Neurological: Positive for numbness (HPI). Negative for dizziness, tremors, speech difficulty, weakness and light-headedness.        HPI   Hematological: Does not bruise/bleed easily.   Psychiatric/Behavioral: Positive for sleep disturbance (HPI). Negative for agitation, confusion,  "decreased concentration and suicidal ideas. The patient is not nervous/anxious.           Objective:      Vitals:    05/24/22 1440   BP: 136/84   Pulse: 102   Resp: 14   Temp: 99 °F (37.2 °C)   SpO2: 98%   Weight: 59.4 kg (131 lb)   Height: 5' 1" (1.549 m)     Physical Exam  Constitutional:       General: She is not in acute distress.     Appearance: Normal appearance. She is not ill-appearing.   HENT:      Head: Normocephalic and atraumatic.   Eyes:      Extraocular Movements: Extraocular movements intact.      Pupils: Pupils are equal, round, and reactive to light.   Cardiovascular:      Rate and Rhythm: Normal rate and regular rhythm.      Pulses: Normal pulses.      Heart sounds: Normal heart sounds.   Pulmonary:      Effort: Pulmonary effort is normal.      Breath sounds: Normal breath sounds.   Abdominal:      General: Bowel sounds are normal.      Palpations: Abdomen is soft.   Musculoskeletal:      Cervical back: Normal range of motion and neck supple.      Right lower leg: No edema.      Left lower leg: No edema.   Skin:     General: Skin is warm and dry.   Neurological:      General: No focal deficit present.      Mental Status: She is alert and oriented to person, place, and time.   Psychiatric:         Mood and Affect: Mood normal.         Thought Content: Thought content normal.         Judgment: Judgment normal.           Assessment:       1. Tingling pain         Plan:       Tingling pain      No follow-ups on file.        5/24/2022 Dary Ace M.D.      "

## 2022-05-24 ENCOUNTER — OFFICE VISIT (OUTPATIENT)
Dept: FAMILY MEDICINE | Facility: CLINIC | Age: 52
End: 2022-05-24
Payer: COMMERCIAL

## 2022-05-24 VITALS
DIASTOLIC BLOOD PRESSURE: 84 MMHG | BODY MASS INDEX: 24.73 KG/M2 | WEIGHT: 131 LBS | RESPIRATION RATE: 14 BRPM | OXYGEN SATURATION: 98 % | SYSTOLIC BLOOD PRESSURE: 136 MMHG | HEIGHT: 61 IN | HEART RATE: 102 BPM | TEMPERATURE: 99 F

## 2022-05-24 DIAGNOSIS — R52 TINGLING PAIN: Primary | ICD-10-CM

## 2022-05-24 DIAGNOSIS — I10 ESSENTIAL HYPERTENSION, BENIGN: ICD-10-CM

## 2022-05-24 PROCEDURE — 99214 PR OFFICE/OUTPT VISIT, EST, LEVL IV, 30-39 MIN: ICD-10-PCS | Mod: S$GLB,,, | Performed by: INTERNAL MEDICINE

## 2022-05-24 PROCEDURE — 1159F MED LIST DOCD IN RCRD: CPT | Mod: CPTII,S$GLB,, | Performed by: INTERNAL MEDICINE

## 2022-05-24 PROCEDURE — 1160F PR REVIEW ALL MEDS BY PRESCRIBER/CLIN PHARMACIST DOCUMENTED: ICD-10-PCS | Mod: CPTII,S$GLB,, | Performed by: INTERNAL MEDICINE

## 2022-05-24 PROCEDURE — 1159F PR MEDICATION LIST DOCUMENTED IN MEDICAL RECORD: ICD-10-PCS | Mod: CPTII,S$GLB,, | Performed by: INTERNAL MEDICINE

## 2022-05-24 PROCEDURE — 3008F PR BODY MASS INDEX (BMI) DOCUMENTED: ICD-10-PCS | Mod: CPTII,S$GLB,, | Performed by: INTERNAL MEDICINE

## 2022-05-24 PROCEDURE — 3075F PR MOST RECENT SYSTOLIC BLOOD PRESS GE 130-139MM HG: ICD-10-PCS | Mod: CPTII,S$GLB,, | Performed by: INTERNAL MEDICINE

## 2022-05-24 PROCEDURE — 1160F RVW MEDS BY RX/DR IN RCRD: CPT | Mod: CPTII,S$GLB,, | Performed by: INTERNAL MEDICINE

## 2022-05-24 PROCEDURE — 3008F BODY MASS INDEX DOCD: CPT | Mod: CPTII,S$GLB,, | Performed by: INTERNAL MEDICINE

## 2022-05-24 PROCEDURE — 3079F PR MOST RECENT DIASTOLIC BLOOD PRESSURE 80-89 MM HG: ICD-10-PCS | Mod: CPTII,S$GLB,, | Performed by: INTERNAL MEDICINE

## 2022-05-24 PROCEDURE — 99214 OFFICE O/P EST MOD 30 MIN: CPT | Mod: S$GLB,,, | Performed by: INTERNAL MEDICINE

## 2022-05-24 PROCEDURE — 3079F DIAST BP 80-89 MM HG: CPT | Mod: CPTII,S$GLB,, | Performed by: INTERNAL MEDICINE

## 2022-05-24 PROCEDURE — 3075F SYST BP GE 130 - 139MM HG: CPT | Mod: CPTII,S$GLB,, | Performed by: INTERNAL MEDICINE

## 2022-06-06 ENCOUNTER — TELEPHONE (OUTPATIENT)
Dept: NEUROLOGY | Facility: CLINIC | Age: 52
End: 2022-06-06
Payer: COMMERCIAL

## 2022-06-07 ENCOUNTER — TELEPHONE (OUTPATIENT)
Dept: NEUROLOGY | Facility: CLINIC | Age: 52
End: 2022-06-07
Payer: COMMERCIAL

## 2022-06-07 NOTE — TELEPHONE ENCOUNTER
----- Message from Flores Light sent at 6/7/2022  4:54 PM CDT -----  Contact: CAIN VELIZ [[id number]]  Type:  Patient Returning Call    Who Called:CAIN VELIZ [[id number]]    Who Left Message for Patient:Jaki De León MA     Does the patient know what this is regarding?:    Would the patient rather a call back or a response via [facility]?     Best Call Back Number: [phone] (mobile)    Additional Information:     
Patient called and when appointment was offered, she stated she wanted to speak with her PCP to make sure the provider that's on the referral is correct.  Patient stated she will call tomorrow after speaking with her provider if she needs to set up a appointment.  
13.8

## 2022-06-13 ENCOUNTER — OFFICE VISIT (OUTPATIENT)
Dept: FAMILY MEDICINE | Facility: CLINIC | Age: 52
End: 2022-06-13
Payer: COMMERCIAL

## 2022-06-13 VITALS
HEART RATE: 88 BPM | TEMPERATURE: 98 F | DIASTOLIC BLOOD PRESSURE: 72 MMHG | OXYGEN SATURATION: 99 % | WEIGHT: 132 LBS | HEIGHT: 61 IN | BODY MASS INDEX: 24.92 KG/M2 | RESPIRATION RATE: 14 BRPM | SYSTOLIC BLOOD PRESSURE: 110 MMHG

## 2022-06-13 DIAGNOSIS — L20.9 ATOPIC DERMATITIS, UNSPECIFIED TYPE: Primary | ICD-10-CM

## 2022-06-13 PROCEDURE — 3074F SYST BP LT 130 MM HG: CPT | Mod: CPTII,S$GLB,, | Performed by: INTERNAL MEDICINE

## 2022-06-13 PROCEDURE — 1160F PR REVIEW ALL MEDS BY PRESCRIBER/CLIN PHARMACIST DOCUMENTED: ICD-10-PCS | Mod: CPTII,S$GLB,, | Performed by: INTERNAL MEDICINE

## 2022-06-13 PROCEDURE — 3078F PR MOST RECENT DIASTOLIC BLOOD PRESSURE < 80 MM HG: ICD-10-PCS | Mod: CPTII,S$GLB,, | Performed by: INTERNAL MEDICINE

## 2022-06-13 PROCEDURE — 1160F RVW MEDS BY RX/DR IN RCRD: CPT | Mod: CPTII,S$GLB,, | Performed by: INTERNAL MEDICINE

## 2022-06-13 PROCEDURE — 99213 PR OFFICE/OUTPT VISIT, EST, LEVL III, 20-29 MIN: ICD-10-PCS | Mod: S$GLB,,, | Performed by: INTERNAL MEDICINE

## 2022-06-13 PROCEDURE — 3074F PR MOST RECENT SYSTOLIC BLOOD PRESSURE < 130 MM HG: ICD-10-PCS | Mod: CPTII,S$GLB,, | Performed by: INTERNAL MEDICINE

## 2022-06-13 PROCEDURE — 1159F MED LIST DOCD IN RCRD: CPT | Mod: CPTII,S$GLB,, | Performed by: INTERNAL MEDICINE

## 2022-06-13 PROCEDURE — 3008F PR BODY MASS INDEX (BMI) DOCUMENTED: ICD-10-PCS | Mod: CPTII,S$GLB,, | Performed by: INTERNAL MEDICINE

## 2022-06-13 PROCEDURE — 3008F BODY MASS INDEX DOCD: CPT | Mod: CPTII,S$GLB,, | Performed by: INTERNAL MEDICINE

## 2022-06-13 PROCEDURE — 3078F DIAST BP <80 MM HG: CPT | Mod: CPTII,S$GLB,, | Performed by: INTERNAL MEDICINE

## 2022-06-13 PROCEDURE — 1159F PR MEDICATION LIST DOCUMENTED IN MEDICAL RECORD: ICD-10-PCS | Mod: CPTII,S$GLB,, | Performed by: INTERNAL MEDICINE

## 2022-06-13 PROCEDURE — 99213 OFFICE O/P EST LOW 20 MIN: CPT | Mod: S$GLB,,, | Performed by: INTERNAL MEDICINE

## 2022-06-13 RX ORDER — METHYLPREDNISOLONE 4 MG/1
TABLET ORAL
Qty: 1 EACH | Refills: 1 | Status: SHIPPED | OUTPATIENT
Start: 2022-06-13 | End: 2022-06-28 | Stop reason: ALTCHOICE

## 2022-06-13 NOTE — PROGRESS NOTES
"  SUBJECTIVE:    Patient ID: Jada Fuchs is a 52 y.o. female.    Chief Complaint: Rash (Arms and legs and burns)    HPI   Papular rash to bilateral upper and lower extremities that was noticed on Saturday and has gotten progressively worse with burning. Minimal breathing "sigh" at night. Denies any n/v/d, new clothing or material exposure. Denies any new food or environmental exposures.     Admission on 05/20/2022, Discharged on 05/21/2022   Component Date Value Ref Range Status    WBC 05/20/2022 5.05  3.90 - 12.70 K/uL Final    RBC 05/20/2022 4.47  4.00 - 5.40 M/uL Final    Hemoglobin 05/20/2022 13.3  12.0 - 16.0 g/dL Final    Hematocrit 05/20/2022 39.4  37.0 - 48.5 % Final    MCV 05/20/2022 88  82 - 98 fL Final    MCH 05/20/2022 29.8  27.0 - 31.0 pg Final    MCHC 05/20/2022 33.8  32.0 - 36.0 g/dL Final    RDW 05/20/2022 12.3  11.5 - 14.5 % Final    Platelets 05/20/2022 162  150 - 450 K/uL Final    MPV 05/20/2022 12.9  9.2 - 12.9 fL Final    Immature Granulocytes 05/20/2022 0.0  0.0 - 0.5 % Final    Gran # (ANC) 05/20/2022 3.1  1.8 - 7.7 K/uL Final    Immature Grans (Abs) 05/20/2022 0.00  0.00 - 0.04 K/uL Final    Lymph # 05/20/2022 1.6  1.0 - 4.8 K/uL Final    Mono # 05/20/2022 0.4  0.3 - 1.0 K/uL Final    Eos # 05/20/2022 0.1  0.0 - 0.5 K/uL Final    Baso # 05/20/2022 0.02  0.00 - 0.20 K/uL Final    nRBC 05/20/2022 0  0 /100 WBC Final    Gran % 05/20/2022 60.4  38.0 - 73.0 % Final    Lymph % 05/20/2022 30.9  18.0 - 48.0 % Final    Mono % 05/20/2022 6.9  4.0 - 15.0 % Final    Eosinophil % 05/20/2022 1.4  0.0 - 8.0 % Final    Basophil % 05/20/2022 0.4  0.0 - 1.9 % Final    Differential Method 05/20/2022 Automated   Final    Sodium 05/20/2022 136  136 - 145 mmol/L Final    Potassium 05/20/2022 3.5  3.5 - 5.1 mmol/L Final    Chloride 05/20/2022 104  95 - 110 mmol/L Final    CO2 05/20/2022 23  23 - 29 mmol/L Final    Glucose 05/20/2022 91  70 - 110 mg/dL Final    BUN 05/20/2022 14  " 6 - 20 mg/dL Final    Creatinine 05/20/2022 0.7  0.5 - 1.4 mg/dL Final    Calcium 05/20/2022 9.0  8.7 - 10.5 mg/dL Final    Total Protein 05/20/2022 7.5  6.0 - 8.4 g/dL Final    Albumin 05/20/2022 4.5  3.5 - 5.2 g/dL Final    Total Bilirubin 05/20/2022 0.7  0.1 - 1.0 mg/dL Final    Alkaline Phosphatase 05/20/2022 55  55 - 135 U/L Final    AST 05/20/2022 20  10 - 40 U/L Final    ALT 05/20/2022 16  10 - 44 U/L Final    Anion Gap 05/20/2022 9  8 - 16 mmol/L Final    eGFR if African American 05/20/2022 >60.0  >60 mL/min/1.73 m^2 Final    eGFR if non African American 05/20/2022 >60.0  >60 mL/min/1.73 m^2 Final    TSH 05/20/2022 2.930  0.340 - 5.600 uIU/mL Final    Troponin I 05/20/2022 <0.030  <=0.040 ng/mL Final    Specimen UA 05/20/2022 Urine, Clean Catch   Final    Color, UA 05/20/2022 Yellow  Yellow, Straw, Caterina Final    Appearance, UA 05/20/2022 Clear  Clear Final    pH, UA 05/20/2022 7.0  5.0 - 8.0 Final    Specific Gravity, UA 05/20/2022 1.025  1.005 - 1.030 Final    Protein, UA 05/20/2022 Negative  Negative Final    Glucose, UA 05/20/2022 Negative  Negative Final    Ketones, UA 05/20/2022 Negative  Negative Final    Bilirubin (UA) 05/20/2022 Negative  Negative Final    Occult Blood UA 05/20/2022 Negative  Negative Final    Nitrite, UA 05/20/2022 Negative  Negative Final    Urobilinogen, UA 05/20/2022 Negative  Negative EU/dL Final    Leukocytes, UA 05/20/2022 Negative  Negative Final    Magnesium 05/20/2022 1.9  1.6 - 2.6 mg/dL Final    BNP 05/20/2022 12  0 - 99 pg/mL Final    Troponin I 05/20/2022 <0.030  <=0.040 ng/mL Final    POC Preg Test, Ur 05/20/2022 Negative  Negative Final     Acceptable 05/20/2022 Yes   Final   Office Visit on 05/20/2022   Component Date Value Ref Range Status    POC Blood, Urine 05/20/2022 Negative  Negative Final    POC Bilirubin, Urine 05/20/2022 Negative  Negative Final    POC Urobilinogen, Urine 05/20/2022 normal  0.1 - 1.1 Final     POC Ketones, Urine 05/20/2022 Negative  Negative Final    POC Protein, Urine 05/20/2022 Negative  Negative Final    POC Nitrates, Urine 05/20/2022 Negative  Negative Final    POC Glucose, Urine 05/20/2022 Negative  Negative Final    pH, UA 05/20/2022 5.0   Final    POC Specific Gravity, Urine 05/20/2022 1.025  1.003 - 1.029 Final    POC Leukocytes, Urine 05/20/2022 Negative  Negative Final   Lab Visit on 12/09/2021   Component Date Value Ref Range Status    WBC 12/09/2021 3.37 (A) 3.90 - 12.70 K/uL Final    RBC 12/09/2021 4.18  4.00 - 5.40 M/uL Final    Hemoglobin 12/09/2021 12.6  12.0 - 16.0 g/dL Final    Hematocrit 12/09/2021 38.8  37.0 - 48.5 % Final    MCV 12/09/2021 93  82 - 98 fL Final    MCH 12/09/2021 30.1  27.0 - 31.0 pg Final    MCHC 12/09/2021 32.5  32.0 - 36.0 g/dL Final    RDW 12/09/2021 12.7  11.5 - 14.5 % Final    Platelets 12/09/2021 156  150 - 450 K/uL Final    MPV 12/09/2021 12.5  9.2 - 12.9 fL Final    Immature Granulocytes 12/09/2021 0.3  0.0 - 0.5 % Final    Gran # (ANC) 12/09/2021 1.8  1.8 - 7.7 K/uL Final    Immature Grans (Abs) 12/09/2021 0.01  0.00 - 0.04 K/uL Final    Lymph # 12/09/2021 1.3  1.0 - 4.8 K/uL Final    Mono # 12/09/2021 0.3  0.3 - 1.0 K/uL Final    Eos # 12/09/2021 0.1  0.0 - 0.5 K/uL Final    Baso # 12/09/2021 0.01  0.00 - 0.20 K/uL Final    nRBC 12/09/2021 0  0 /100 WBC Final    Gran % 12/09/2021 51.9  38.0 - 73.0 % Final    Lymph % 12/09/2021 37.4  18.0 - 48.0 % Final    Mono % 12/09/2021 8.6  4.0 - 15.0 % Final    Eosinophil % 12/09/2021 1.5  0.0 - 8.0 % Final    Basophil % 12/09/2021 0.3  0.0 - 1.9 % Final    Differential Method 12/09/2021 Automated   Final    Sodium 12/09/2021 139  136 - 145 mmol/L Final    Potassium 12/09/2021 3.9  3.5 - 5.1 mmol/L Final    Chloride 12/09/2021 104  95 - 110 mmol/L Final    CO2 12/09/2021 28  23 - 29 mmol/L Final    Glucose 12/09/2021 86  70 - 110 mg/dL Final    BUN 12/09/2021 14  6 - 20 mg/dL Final     Creatinine 12/09/2021 0.8  0.5 - 1.4 mg/dL Final    Calcium 12/09/2021 8.9  8.7 - 10.5 mg/dL Final    Total Protein 12/09/2021 7.3  6.0 - 8.4 g/dL Final    Albumin 12/09/2021 4.2  3.5 - 5.2 g/dL Final    Total Bilirubin 12/09/2021 0.8  0.1 - 1.0 mg/dL Final    Alkaline Phosphatase 12/09/2021 47 (A) 55 - 135 U/L Final    AST 12/09/2021 24  10 - 40 U/L Final    ALT 12/09/2021 21  10 - 44 U/L Final    Anion Gap 12/09/2021 7 (A) 8 - 16 mmol/L Final    eGFR if African American 12/09/2021 >60.0  >60 mL/min/1.73 m^2 Final    eGFR if non African American 12/09/2021 >60.0  >60 mL/min/1.73 m^2 Final    Cholesterol 12/09/2021 189  120 - 199 mg/dL Final    Triglycerides 12/09/2021 36  30 - 150 mg/dL Final    HDL 12/09/2021 90 (A) 40 - 75 mg/dL Final    LDL Cholesterol 12/09/2021 91.8  63.0 - 159.0 mg/dL Final    HDL/Cholesterol Ratio 12/09/2021 47.6  20.0 - 50.0 % Final    Total Cholesterol/HDL Ratio 12/09/2021 2.1  2.0 - 5.0 Final    Non-HDL Cholesterol 12/09/2021 99  mg/dL Final    Vitamin B-12 12/09/2021 569  210 - 950 pg/mL Final    Vitamin B6 12/09/2021 51.2 (A) 2.0 - 32.8 ug/L Final       Past Medical History:   Diagnosis Date    Abdominal pain     Anemia     probably secondary to fibroid    Blood transfusion     History of uterine fibroid     Hypothyroidism     Wears glasses     CONTACS     Past Surgical History:   Procedure Laterality Date    BREAST BIOPSY Left     EXC    BREAST BIOPSY Left     NEEDLE    BREAST SURGERY      mass removed    HYSTERECTOMY  2015     Family History   Problem Relation Age of Onset    Cancer Maternal Grandfather         prostate    Hypertension Mother     Seizures Maternal Grandmother     Aneurysm Father     Eczema Sister     Melanoma Neg Hx        Marital Status:   Alcohol History:  reports current alcohol use.  Tobacco History:  reports that she has never smoked. She has never used smokeless tobacco.  Drug History:  reports no history of drug  use.    Review of patient's allergies indicates:   Allergen Reactions    Adderall [dextroamphetamine-amphetamine]      Severe sx hypertension    Lisinopril      UNDESIRABLE SIDE EFFECT       Current Outpatient Medications:     amLODIPine (NORVASC) 10 MG tablet, Take 1 tablet (10 mg total) by mouth once daily., Disp: 30 tablet, Rfl: 2    diclofenac sodium (VOLTAREN) 1 % Gel, Apply 2 g topically 4 (four) times daily. (Patient not taking: No sig reported), Disp: 20 g, Rfl: 0    gabapentin (NEURONTIN) 300 MG capsule, Take 1 capsule (300 mg total) by mouth 3 (three) times daily. (Patient not taking: No sig reported), Disp: 90 capsule, Rfl: 0    methylPREDNISolone (MEDROL DOSEPACK) 4 mg tablet, Take entire day's dose in the morning each day., Disp: 1 each, Rfl: 1    naproxen (NAPROSYN) 500 MG tablet, Take 1 tablet (500 mg total) by mouth 2 (two) times daily as needed (pain). (Patient not taking: No sig reported), Disp: 60 tablet, Rfl: 0    Review of Systems   Constitutional: Negative for appetite change, chills, diaphoresis, fatigue, fever and unexpected weight change.   HENT: Negative for congestion, ear pain, hearing loss, nosebleeds, postnasal drip, sinus pressure, sinus pain, sneezing, sore throat, tinnitus, trouble swallowing and voice change.    Eyes: Negative for photophobia, pain, itching and visual disturbance.   Respiratory: Negative for apnea, cough, chest tightness, shortness of breath, wheezing and stridor.    Cardiovascular: Negative for chest pain, palpitations and leg swelling.   Gastrointestinal: Negative for abdominal distention, abdominal pain, blood in stool, constipation, diarrhea, nausea and vomiting.   Endocrine: Negative for cold intolerance, heat intolerance, polydipsia and polyuria.   Genitourinary: Negative for difficulty urinating, dyspareunia, dysuria, flank pain, frequency, hematuria, menstrual problem, pelvic pain, urgency, vaginal discharge and vaginal pain.   Musculoskeletal:  Negative for arthralgias, back pain, joint swelling, myalgias, neck pain and neck stiffness.   Skin: Positive for rash (bilateral upper and lower extremities). Negative for pallor.   Allergic/Immunologic: Negative for environmental allergies and food allergies.   Neurological: Negative for dizziness, tremors, speech difficulty, weakness, light-headedness and numbness.   Hematological: Does not bruise/bleed easily.   Psychiatric/Behavioral: Negative for agitation, confusion, decreased concentration, sleep disturbance and suicidal ideas. The patient is not nervous/anxious.           Objective:      Last 3 sets of Vitals    Vitals - 1 value per visit 5/24/2022 6/13/2022 6/13/2022   SYSTOLIC 136 - 110   DIASTOLIC 84 - 72   Pulse 102 - 88   Temp 99 - 98.4   Resp 14 - 14   SPO2 98 - 99   Weight (lb) 131 - 132   Weight (kg) 59.421 - 59.875   Height 61 - 61   BMI (Calculated) 24.8 - 25   VISIT REPORT - - -   Pain Score  - 0 -       Physical Exam  Constitutional:       Appearance: Normal appearance.   HENT:      Head: Normocephalic and atraumatic.      Nose: Nose normal.      Mouth/Throat:      Mouth: Mucous membranes are moist.      Pharynx: Oropharynx is clear.   Eyes:      Extraocular Movements: Extraocular movements intact.      Conjunctiva/sclera: Conjunctivae normal.      Pupils: Pupils are equal, round, and reactive to light.   Cardiovascular:      Rate and Rhythm: Normal rate.      Pulses: Normal pulses.      Heart sounds: Normal heart sounds.   Pulmonary:      Effort: Pulmonary effort is normal.      Breath sounds: Normal breath sounds.   Abdominal:      General: Bowel sounds are normal.      Palpations: Abdomen is soft.   Musculoskeletal:         General: Normal range of motion.      Cervical back: Normal range of motion and neck supple.   Skin:     General: Skin is warm and dry.      Capillary Refill: Capillary refill takes less than 2 seconds.      Findings: Rash present. Rash is papular.      Comments: Rash to  bilateral upper and lower extremities   Neurological:      General: No focal deficit present.      Mental Status: She is alert and oriented to person, place, and time. Mental status is at baseline.   Psychiatric:         Mood and Affect: Mood normal.         Behavior: Behavior normal.         Thought Content: Thought content normal.         Judgment: Judgment normal.           Assessment:       1. Atopic dermatitis, unspecified type         Plan:       Atopic dermatitis, unspecified type  -     methylPREDNISolone (MEDROL DOSEPACK) 4 mg tablet; Take entire day's dose in the morning each day.  Dispense: 1 each; Refill: 1      Follow up if symptoms worsen or fail to improve.        6/13/2022 Dary Ace M.D.

## 2022-06-14 ENCOUNTER — PATIENT MESSAGE (OUTPATIENT)
Dept: FAMILY MEDICINE | Facility: CLINIC | Age: 52
End: 2022-06-14

## 2022-06-26 NOTE — PROGRESS NOTES
SUBJECTIVE:    Patient ID: Jada Fuchs is a 52 y.o. female.    Chief Complaint: Hypertension and Follow-up    HPI     Follow-up BP-it has been controlled at home and here-had slight elevation with leg swelling after air travel , back to normal now    Rash now gone post medrol dose-basilia    Admission on 05/20/2022, Discharged on 05/21/2022   Component Date Value Ref Range Status    WBC 05/20/2022 5.05  3.90 - 12.70 K/uL Final    RBC 05/20/2022 4.47  4.00 - 5.40 M/uL Final    Hemoglobin 05/20/2022 13.3  12.0 - 16.0 g/dL Final    Hematocrit 05/20/2022 39.4  37.0 - 48.5 % Final    MCV 05/20/2022 88  82 - 98 fL Final    MCH 05/20/2022 29.8  27.0 - 31.0 pg Final    MCHC 05/20/2022 33.8  32.0 - 36.0 g/dL Final    RDW 05/20/2022 12.3  11.5 - 14.5 % Final    Platelets 05/20/2022 162  150 - 450 K/uL Final    MPV 05/20/2022 12.9  9.2 - 12.9 fL Final    Immature Granulocytes 05/20/2022 0.0  0.0 - 0.5 % Final    Gran # (ANC) 05/20/2022 3.1  1.8 - 7.7 K/uL Final    Immature Grans (Abs) 05/20/2022 0.00  0.00 - 0.04 K/uL Final    Lymph # 05/20/2022 1.6  1.0 - 4.8 K/uL Final    Mono # 05/20/2022 0.4  0.3 - 1.0 K/uL Final    Eos # 05/20/2022 0.1  0.0 - 0.5 K/uL Final    Baso # 05/20/2022 0.02  0.00 - 0.20 K/uL Final    nRBC 05/20/2022 0  0 /100 WBC Final    Gran % 05/20/2022 60.4  38.0 - 73.0 % Final    Lymph % 05/20/2022 30.9  18.0 - 48.0 % Final    Mono % 05/20/2022 6.9  4.0 - 15.0 % Final    Eosinophil % 05/20/2022 1.4  0.0 - 8.0 % Final    Basophil % 05/20/2022 0.4  0.0 - 1.9 % Final    Differential Method 05/20/2022 Automated   Final    Sodium 05/20/2022 136  136 - 145 mmol/L Final    Potassium 05/20/2022 3.5  3.5 - 5.1 mmol/L Final    Chloride 05/20/2022 104  95 - 110 mmol/L Final    CO2 05/20/2022 23  23 - 29 mmol/L Final    Glucose 05/20/2022 91  70 - 110 mg/dL Final    BUN 05/20/2022 14  6 - 20 mg/dL Final    Creatinine 05/20/2022 0.7  0.5 - 1.4 mg/dL Final    Calcium 05/20/2022 9.0   8.7 - 10.5 mg/dL Final    Total Protein 05/20/2022 7.5  6.0 - 8.4 g/dL Final    Albumin 05/20/2022 4.5  3.5 - 5.2 g/dL Final    Total Bilirubin 05/20/2022 0.7  0.1 - 1.0 mg/dL Final    Alkaline Phosphatase 05/20/2022 55  55 - 135 U/L Final    AST 05/20/2022 20  10 - 40 U/L Final    ALT 05/20/2022 16  10 - 44 U/L Final    Anion Gap 05/20/2022 9  8 - 16 mmol/L Final    eGFR if African American 05/20/2022 >60.0  >60 mL/min/1.73 m^2 Final    eGFR if non African American 05/20/2022 >60.0  >60 mL/min/1.73 m^2 Final    TSH 05/20/2022 2.930  0.340 - 5.600 uIU/mL Final    Troponin I 05/20/2022 <0.030  <=0.040 ng/mL Final    Specimen UA 05/20/2022 Urine, Clean Catch   Final    Color, UA 05/20/2022 Yellow  Yellow, Straw, Caterina Final    Appearance, UA 05/20/2022 Clear  Clear Final    pH, UA 05/20/2022 7.0  5.0 - 8.0 Final    Specific Gravity, UA 05/20/2022 1.025  1.005 - 1.030 Final    Protein, UA 05/20/2022 Negative  Negative Final    Glucose, UA 05/20/2022 Negative  Negative Final    Ketones, UA 05/20/2022 Negative  Negative Final    Bilirubin (UA) 05/20/2022 Negative  Negative Final    Occult Blood UA 05/20/2022 Negative  Negative Final    Nitrite, UA 05/20/2022 Negative  Negative Final    Urobilinogen, UA 05/20/2022 Negative  Negative EU/dL Final    Leukocytes, UA 05/20/2022 Negative  Negative Final    Magnesium 05/20/2022 1.9  1.6 - 2.6 mg/dL Final    BNP 05/20/2022 12  0 - 99 pg/mL Final    Troponin I 05/20/2022 <0.030  <=0.040 ng/mL Final    POC Preg Test, Ur 05/20/2022 Negative  Negative Final     Acceptable 05/20/2022 Yes   Final   Office Visit on 05/20/2022   Component Date Value Ref Range Status    POC Blood, Urine 05/20/2022 Negative  Negative Final    POC Bilirubin, Urine 05/20/2022 Negative  Negative Final    POC Urobilinogen, Urine 05/20/2022 normal  0.1 - 1.1 Final    POC Ketones, Urine 05/20/2022 Negative  Negative Final    POC Protein, Urine 05/20/2022 Negative   Negative Final    POC Nitrates, Urine 05/20/2022 Negative  Negative Final    POC Glucose, Urine 05/20/2022 Negative  Negative Final    pH, UA 05/20/2022 5.0   Final    POC Specific Gravity, Urine 05/20/2022 1.025  1.003 - 1.029 Final    POC Leukocytes, Urine 05/20/2022 Negative  Negative Final   Lab Visit on 12/09/2021   Component Date Value Ref Range Status    WBC 12/09/2021 3.37 (A) 3.90 - 12.70 K/uL Final    RBC 12/09/2021 4.18  4.00 - 5.40 M/uL Final    Hemoglobin 12/09/2021 12.6  12.0 - 16.0 g/dL Final    Hematocrit 12/09/2021 38.8  37.0 - 48.5 % Final    MCV 12/09/2021 93  82 - 98 fL Final    MCH 12/09/2021 30.1  27.0 - 31.0 pg Final    MCHC 12/09/2021 32.5  32.0 - 36.0 g/dL Final    RDW 12/09/2021 12.7  11.5 - 14.5 % Final    Platelets 12/09/2021 156  150 - 450 K/uL Final    MPV 12/09/2021 12.5  9.2 - 12.9 fL Final    Immature Granulocytes 12/09/2021 0.3  0.0 - 0.5 % Final    Gran # (ANC) 12/09/2021 1.8  1.8 - 7.7 K/uL Final    Immature Grans (Abs) 12/09/2021 0.01  0.00 - 0.04 K/uL Final    Lymph # 12/09/2021 1.3  1.0 - 4.8 K/uL Final    Mono # 12/09/2021 0.3  0.3 - 1.0 K/uL Final    Eos # 12/09/2021 0.1  0.0 - 0.5 K/uL Final    Baso # 12/09/2021 0.01  0.00 - 0.20 K/uL Final    nRBC 12/09/2021 0  0 /100 WBC Final    Gran % 12/09/2021 51.9  38.0 - 73.0 % Final    Lymph % 12/09/2021 37.4  18.0 - 48.0 % Final    Mono % 12/09/2021 8.6  4.0 - 15.0 % Final    Eosinophil % 12/09/2021 1.5  0.0 - 8.0 % Final    Basophil % 12/09/2021 0.3  0.0 - 1.9 % Final    Differential Method 12/09/2021 Automated   Final    Sodium 12/09/2021 139  136 - 145 mmol/L Final    Potassium 12/09/2021 3.9  3.5 - 5.1 mmol/L Final    Chloride 12/09/2021 104  95 - 110 mmol/L Final    CO2 12/09/2021 28  23 - 29 mmol/L Final    Glucose 12/09/2021 86  70 - 110 mg/dL Final    BUN 12/09/2021 14  6 - 20 mg/dL Final    Creatinine 12/09/2021 0.8  0.5 - 1.4 mg/dL Final    Calcium 12/09/2021 8.9  8.7 - 10.5 mg/dL  Final    Total Protein 12/09/2021 7.3  6.0 - 8.4 g/dL Final    Albumin 12/09/2021 4.2  3.5 - 5.2 g/dL Final    Total Bilirubin 12/09/2021 0.8  0.1 - 1.0 mg/dL Final    Alkaline Phosphatase 12/09/2021 47 (A) 55 - 135 U/L Final    AST 12/09/2021 24  10 - 40 U/L Final    ALT 12/09/2021 21  10 - 44 U/L Final    Anion Gap 12/09/2021 7 (A) 8 - 16 mmol/L Final    eGFR if African American 12/09/2021 >60.0  >60 mL/min/1.73 m^2 Final    eGFR if non African American 12/09/2021 >60.0  >60 mL/min/1.73 m^2 Final    Cholesterol 12/09/2021 189  120 - 199 mg/dL Final    Triglycerides 12/09/2021 36  30 - 150 mg/dL Final    HDL 12/09/2021 90 (A) 40 - 75 mg/dL Final    LDL Cholesterol 12/09/2021 91.8  63.0 - 159.0 mg/dL Final    HDL/Cholesterol Ratio 12/09/2021 47.6  20.0 - 50.0 % Final    Total Cholesterol/HDL Ratio 12/09/2021 2.1  2.0 - 5.0 Final    Non-HDL Cholesterol 12/09/2021 99  mg/dL Final    Vitamin B-12 12/09/2021 569  210 - 950 pg/mL Final    Vitamin B6 12/09/2021 51.2 (A) 2.0 - 32.8 ug/L Final       Past Medical History:   Diagnosis Date    Abdominal pain     Anemia     probably secondary to fibroid    Blood transfusion     History of uterine fibroid     Hypothyroidism     Wears glasses     CONTACS     Past Surgical History:   Procedure Laterality Date    BREAST BIOPSY Left     EXC    BREAST BIOPSY Left     NEEDLE    BREAST SURGERY      mass removed    HYSTERECTOMY  2015     Family History   Problem Relation Age of Onset    Cancer Maternal Grandfather         prostate    Hypertension Mother     Seizures Maternal Grandmother     Aneurysm Father     Eczema Sister     Melanoma Neg Hx        Marital Status:   Alcohol History:  reports current alcohol use.  Tobacco History:  reports that she has never smoked. She has never used smokeless tobacco.  Drug History:  reports no history of drug use.    Review of patient's allergies indicates:   Allergen Reactions    Adderall  [dextroamphetamine-amphetamine]      Severe sx hypertension    Lisinopril      UNDESIRABLE SIDE EFFECT       Current Outpatient Medications:     amLODIPine (NORVASC) 10 MG tablet, Take 1 tablet (10 mg total) by mouth once daily., Disp: 30 tablet, Rfl: 2    Review of Systems   Constitutional: Negative for activity change, appetite change, chills, fatigue, fever and unexpected weight change.   HENT: Negative for congestion, ear pain, hearing loss, postnasal drip, sinus pain, sneezing, sore throat, tinnitus and trouble swallowing.    Eyes: Negative for pain, discharge and visual disturbance.   Respiratory: Negative for cough, choking, shortness of breath and wheezing.    Cardiovascular: Negative for chest pain, palpitations and leg swelling.   Gastrointestinal: Negative for abdominal distention, abdominal pain, blood in stool, constipation, diarrhea, nausea and vomiting.   Endocrine: Negative for cold intolerance and heat intolerance.   Genitourinary: Negative for difficulty urinating, dysuria, frequency, pelvic pain and urgency.   Musculoskeletal: Negative for back pain, joint swelling and neck pain.   Skin: Negative for pallor and rash.   Allergic/Immunologic: Negative for environmental allergies and food allergies.   Neurological: Negative for dizziness, tremors, weakness, numbness and headaches.   Hematological: Does not bruise/bleed easily.   Psychiatric/Behavioral: Negative for agitation, confusion, dysphoric mood, sleep disturbance and suicidal ideas. The patient is not nervous/anxious.           Objective:      Last 3 sets of Vitals    Vitals - 1 value per visit 6/13/2022 6/28/2022 6/28/2022   SYSTOLIC 110 - 120   DIASTOLIC 72 - 76   Pulse 88 - 82   Temp 98.4 - 98.5   Resp 14 - 14   SPO2 99 - 100   Weight (lb) 132 - 134   Weight (kg) 59.875 - 60.782   Height 61 - 61   BMI (Calculated) 25 - 25.3   VISIT REPORT - - -   Pain Score  - 0 -       Physical Exam  Constitutional:       Appearance: Normal appearance.    HENT:      Head: Normocephalic and atraumatic.   Eyes:      Extraocular Movements: Extraocular movements intact.      Pupils: Pupils are equal, round, and reactive to light.   Neck:      Vascular: No carotid bruit.   Cardiovascular:      Rate and Rhythm: Normal rate and regular rhythm.      Pulses: Normal pulses.      Heart sounds: Normal heart sounds.   Pulmonary:      Effort: Pulmonary effort is normal.      Breath sounds: Normal breath sounds.   Musculoskeletal:      Cervical back: Normal range of motion and neck supple.      Right lower leg: No edema.      Left lower leg: No edema.   Skin:     General: Skin is warm and dry.   Neurological:      General: No focal deficit present.      Mental Status: She is alert and oriented to person, place, and time.   Psychiatric:         Mood and Affect: Mood normal.         Thought Content: Thought content normal.           Assessment:       1. Hypertension, well controlled         Plan:       Hypertension, well controlled      Follow up in about 6 months (around 12/28/2022) for FOLLOW UP LABS, FOLLOW UP MEDICATIONS, FOLLOW-UP STATUS-BP.        6/28/2022 Dary Ace M.D.

## 2022-06-28 ENCOUNTER — OFFICE VISIT (OUTPATIENT)
Dept: FAMILY MEDICINE | Facility: CLINIC | Age: 52
End: 2022-06-28
Payer: COMMERCIAL

## 2022-06-28 VITALS
HEART RATE: 82 BPM | RESPIRATION RATE: 14 BRPM | OXYGEN SATURATION: 100 % | SYSTOLIC BLOOD PRESSURE: 120 MMHG | BODY MASS INDEX: 25.3 KG/M2 | DIASTOLIC BLOOD PRESSURE: 76 MMHG | HEIGHT: 61 IN | TEMPERATURE: 99 F | WEIGHT: 134 LBS

## 2022-06-28 DIAGNOSIS — I10 HYPERTENSION, WELL CONTROLLED: Primary | ICD-10-CM

## 2022-06-28 PROCEDURE — 1159F MED LIST DOCD IN RCRD: CPT | Mod: CPTII,S$GLB,, | Performed by: INTERNAL MEDICINE

## 2022-06-28 PROCEDURE — 3008F PR BODY MASS INDEX (BMI) DOCUMENTED: ICD-10-PCS | Mod: CPTII,S$GLB,, | Performed by: INTERNAL MEDICINE

## 2022-06-28 PROCEDURE — 3074F PR MOST RECENT SYSTOLIC BLOOD PRESSURE < 130 MM HG: ICD-10-PCS | Mod: CPTII,S$GLB,, | Performed by: INTERNAL MEDICINE

## 2022-06-28 PROCEDURE — 99213 OFFICE O/P EST LOW 20 MIN: CPT | Mod: S$GLB,,, | Performed by: INTERNAL MEDICINE

## 2022-06-28 PROCEDURE — 3008F BODY MASS INDEX DOCD: CPT | Mod: CPTII,S$GLB,, | Performed by: INTERNAL MEDICINE

## 2022-06-28 PROCEDURE — 1160F RVW MEDS BY RX/DR IN RCRD: CPT | Mod: CPTII,S$GLB,, | Performed by: INTERNAL MEDICINE

## 2022-06-28 PROCEDURE — 99213 PR OFFICE/OUTPT VISIT, EST, LEVL III, 20-29 MIN: ICD-10-PCS | Mod: S$GLB,,, | Performed by: INTERNAL MEDICINE

## 2022-06-28 PROCEDURE — 3078F PR MOST RECENT DIASTOLIC BLOOD PRESSURE < 80 MM HG: ICD-10-PCS | Mod: CPTII,S$GLB,, | Performed by: INTERNAL MEDICINE

## 2022-06-28 PROCEDURE — 3074F SYST BP LT 130 MM HG: CPT | Mod: CPTII,S$GLB,, | Performed by: INTERNAL MEDICINE

## 2022-06-28 PROCEDURE — 1159F PR MEDICATION LIST DOCUMENTED IN MEDICAL RECORD: ICD-10-PCS | Mod: CPTII,S$GLB,, | Performed by: INTERNAL MEDICINE

## 2022-06-28 PROCEDURE — 1160F PR REVIEW ALL MEDS BY PRESCRIBER/CLIN PHARMACIST DOCUMENTED: ICD-10-PCS | Mod: CPTII,S$GLB,, | Performed by: INTERNAL MEDICINE

## 2022-06-28 PROCEDURE — 3078F DIAST BP <80 MM HG: CPT | Mod: CPTII,S$GLB,, | Performed by: INTERNAL MEDICINE

## 2022-08-22 ENCOUNTER — TELEPHONE (OUTPATIENT)
Dept: NEUROLOGY | Facility: CLINIC | Age: 52
End: 2022-08-22
Payer: COMMERCIAL

## 2022-09-13 ENCOUNTER — OFFICE VISIT (OUTPATIENT)
Dept: NEUROLOGY | Facility: CLINIC | Age: 52
End: 2022-09-13
Payer: COMMERCIAL

## 2022-09-13 ENCOUNTER — TELEPHONE (OUTPATIENT)
Dept: NEUROLOGY | Facility: CLINIC | Age: 52
End: 2022-09-13
Payer: COMMERCIAL

## 2022-09-13 ENCOUNTER — LAB VISIT (OUTPATIENT)
Dept: LAB | Facility: HOSPITAL | Age: 52
End: 2022-09-13
Attending: PSYCHIATRY & NEUROLOGY
Payer: COMMERCIAL

## 2022-09-13 VITALS
HEIGHT: 61 IN | DIASTOLIC BLOOD PRESSURE: 104 MMHG | HEART RATE: 88 BPM | WEIGHT: 132.81 LBS | BODY MASS INDEX: 25.07 KG/M2 | SYSTOLIC BLOOD PRESSURE: 177 MMHG

## 2022-09-13 DIAGNOSIS — G62.9 NEUROPATHY: Primary | ICD-10-CM

## 2022-09-13 DIAGNOSIS — G62.9 NEUROPATHY: ICD-10-CM

## 2022-09-13 LAB
ALBUMIN SERPL BCP-MCNC: 4.1 G/DL (ref 3.5–5.2)
ALP SERPL-CCNC: 59 U/L (ref 55–135)
ALT SERPL W/O P-5'-P-CCNC: 14 U/L (ref 10–44)
ANION GAP SERPL CALC-SCNC: 8 MMOL/L (ref 8–16)
AST SERPL-CCNC: 21 U/L (ref 10–40)
BASOPHILS # BLD AUTO: 0.02 K/UL (ref 0–0.2)
BASOPHILS NFR BLD: 0.4 % (ref 0–1.9)
BILIRUB SERPL-MCNC: 0.6 MG/DL (ref 0.1–1)
BUN SERPL-MCNC: 7 MG/DL (ref 6–20)
CALCIUM SERPL-MCNC: 9 MG/DL (ref 8.7–10.5)
CHLORIDE SERPL-SCNC: 102 MMOL/L (ref 95–110)
CO2 SERPL-SCNC: 25 MMOL/L (ref 23–29)
CREAT SERPL-MCNC: 0.7 MG/DL (ref 0.5–1.4)
CRP SERPL-MCNC: 2.2 MG/L (ref 0–8.2)
DIFFERENTIAL METHOD: ABNORMAL
EOSINOPHIL # BLD AUTO: 0 K/UL (ref 0–0.5)
EOSINOPHIL NFR BLD: 0.7 % (ref 0–8)
ERYTHROCYTE [DISTWIDTH] IN BLOOD BY AUTOMATED COUNT: 14.1 % (ref 11.5–14.5)
ERYTHROCYTE [SEDIMENTATION RATE] IN BLOOD BY PHOTOMETRIC METHOD: 3 MM/HR (ref 0–36)
EST. GFR  (NO RACE VARIABLE): >60 ML/MIN/1.73 M^2
FOLATE SERPL-MCNC: 39.3 NG/ML (ref 4–24)
GLUCOSE SERPL-MCNC: 80 MG/DL (ref 70–110)
HCT VFR BLD AUTO: 35 % (ref 37–48.5)
HGB BLD-MCNC: 11.9 G/DL (ref 12–16)
IMM GRANULOCYTES # BLD AUTO: 0.01 K/UL (ref 0–0.04)
IMM GRANULOCYTES NFR BLD AUTO: 0.2 % (ref 0–0.5)
LYMPHOCYTES # BLD AUTO: 0.7 K/UL (ref 1–4.8)
LYMPHOCYTES NFR BLD: 16.1 % (ref 18–48)
MCH RBC QN AUTO: 31 PG (ref 27–31)
MCHC RBC AUTO-ENTMCNC: 34 G/DL (ref 32–36)
MCV RBC AUTO: 91 FL (ref 82–98)
MONOCYTES # BLD AUTO: 0.4 K/UL (ref 0.3–1)
MONOCYTES NFR BLD: 9.1 % (ref 4–15)
NEUTROPHILS # BLD AUTO: 3.4 K/UL (ref 1.8–7.7)
NEUTROPHILS NFR BLD: 73.5 % (ref 38–73)
NRBC BLD-RTO: 0 /100 WBC
PLATELET # BLD AUTO: 158 K/UL (ref 150–450)
PMV BLD AUTO: 12.3 FL (ref 9.2–12.9)
POTASSIUM SERPL-SCNC: 3.8 MMOL/L (ref 3.5–5.1)
PROT SERPL-MCNC: 7.1 G/DL (ref 6–8.4)
RBC # BLD AUTO: 3.84 M/UL (ref 4–5.4)
SODIUM SERPL-SCNC: 135 MMOL/L (ref 136–145)
VIT B12 SERPL-MCNC: 533 PG/ML (ref 210–950)
WBC # BLD AUTO: 4.61 K/UL (ref 3.9–12.7)

## 2022-09-13 PROCEDURE — 1159F MED LIST DOCD IN RCRD: CPT | Mod: CPTII,S$GLB,, | Performed by: PSYCHIATRY & NEUROLOGY

## 2022-09-13 PROCEDURE — 84165 PROTEIN E-PHORESIS SERUM: CPT | Mod: 26,,, | Performed by: PATHOLOGY

## 2022-09-13 PROCEDURE — 80053 COMPREHEN METABOLIC PANEL: CPT | Performed by: PSYCHIATRY & NEUROLOGY

## 2022-09-13 PROCEDURE — 86255 FLUORESCENT ANTIBODY SCREEN: CPT | Performed by: PSYCHIATRY & NEUROLOGY

## 2022-09-13 PROCEDURE — 82746 ASSAY OF FOLIC ACID SERUM: CPT | Performed by: PSYCHIATRY & NEUROLOGY

## 2022-09-13 PROCEDURE — 1160F PR REVIEW ALL MEDS BY PRESCRIBER/CLIN PHARMACIST DOCUMENTED: ICD-10-PCS | Mod: CPTII,S$GLB,, | Performed by: PSYCHIATRY & NEUROLOGY

## 2022-09-13 PROCEDURE — 86334 PATHOLOGIST INTERPRETATION IFE: ICD-10-PCS | Mod: 26,,, | Performed by: PATHOLOGY

## 2022-09-13 PROCEDURE — 86235 NUCLEAR ANTIGEN ANTIBODY: CPT | Performed by: PSYCHIATRY & NEUROLOGY

## 2022-09-13 PROCEDURE — 99999 PR PBB SHADOW E&M-EST. PATIENT-LVL III: CPT | Mod: PBBFAC,,, | Performed by: PSYCHIATRY & NEUROLOGY

## 2022-09-13 PROCEDURE — 86235 NUCLEAR ANTIGEN ANTIBODY: CPT | Mod: 59 | Performed by: PSYCHIATRY & NEUROLOGY

## 2022-09-13 PROCEDURE — 85652 RBC SED RATE AUTOMATED: CPT | Performed by: PSYCHIATRY & NEUROLOGY

## 2022-09-13 PROCEDURE — 84207 ASSAY OF VITAMIN B-6: CPT | Performed by: PSYCHIATRY & NEUROLOGY

## 2022-09-13 PROCEDURE — 99999 PR PBB SHADOW E&M-EST. PATIENT-LVL III: ICD-10-PCS | Mod: PBBFAC,,, | Performed by: PSYCHIATRY & NEUROLOGY

## 2022-09-13 PROCEDURE — 1160F RVW MEDS BY RX/DR IN RCRD: CPT | Mod: CPTII,S$GLB,, | Performed by: PSYCHIATRY & NEUROLOGY

## 2022-09-13 PROCEDURE — 1159F PR MEDICATION LIST DOCUMENTED IN MEDICAL RECORD: ICD-10-PCS | Mod: CPTII,S$GLB,, | Performed by: PSYCHIATRY & NEUROLOGY

## 2022-09-13 PROCEDURE — 84425 ASSAY OF VITAMIN B-1: CPT | Performed by: PSYCHIATRY & NEUROLOGY

## 2022-09-13 PROCEDURE — 84165 PROTEIN E-PHORESIS SERUM: CPT | Performed by: PSYCHIATRY & NEUROLOGY

## 2022-09-13 PROCEDURE — 85025 COMPLETE CBC W/AUTO DIFF WBC: CPT | Performed by: PSYCHIATRY & NEUROLOGY

## 2022-09-13 PROCEDURE — 3077F PR MOST RECENT SYSTOLIC BLOOD PRESSURE >= 140 MM HG: ICD-10-PCS | Mod: CPTII,S$GLB,, | Performed by: PSYCHIATRY & NEUROLOGY

## 2022-09-13 PROCEDURE — 86140 C-REACTIVE PROTEIN: CPT | Performed by: PSYCHIATRY & NEUROLOGY

## 2022-09-13 PROCEDURE — 86334 IMMUNOFIX E-PHORESIS SERUM: CPT | Mod: 26,,, | Performed by: PATHOLOGY

## 2022-09-13 PROCEDURE — 99204 PR OFFICE/OUTPT VISIT, NEW, LEVL IV, 45-59 MIN: ICD-10-PCS | Mod: S$GLB,,, | Performed by: PSYCHIATRY & NEUROLOGY

## 2022-09-13 PROCEDURE — 36415 COLL VENOUS BLD VENIPUNCTURE: CPT | Mod: PO | Performed by: PSYCHIATRY & NEUROLOGY

## 2022-09-13 PROCEDURE — 86038 ANTINUCLEAR ANTIBODIES: CPT | Performed by: PSYCHIATRY & NEUROLOGY

## 2022-09-13 PROCEDURE — 84165 PATHOLOGIST INTERPRETATION SPE: ICD-10-PCS | Mod: 26,,, | Performed by: PATHOLOGY

## 2022-09-13 PROCEDURE — 3080F PR MOST RECENT DIASTOLIC BLOOD PRESSURE >= 90 MM HG: ICD-10-PCS | Mod: CPTII,S$GLB,, | Performed by: PSYCHIATRY & NEUROLOGY

## 2022-09-13 PROCEDURE — 82607 VITAMIN B-12: CPT | Performed by: PSYCHIATRY & NEUROLOGY

## 2022-09-13 PROCEDURE — 3077F SYST BP >= 140 MM HG: CPT | Mod: CPTII,S$GLB,, | Performed by: PSYCHIATRY & NEUROLOGY

## 2022-09-13 PROCEDURE — 86334 IMMUNOFIX E-PHORESIS SERUM: CPT | Performed by: PSYCHIATRY & NEUROLOGY

## 2022-09-13 PROCEDURE — 99204 OFFICE O/P NEW MOD 45 MIN: CPT | Mod: S$GLB,,, | Performed by: PSYCHIATRY & NEUROLOGY

## 2022-09-13 PROCEDURE — 3080F DIAST BP >= 90 MM HG: CPT | Mod: CPTII,S$GLB,, | Performed by: PSYCHIATRY & NEUROLOGY

## 2022-09-13 NOTE — PROGRESS NOTES
NEUROLOGY  Outpatient CONSULT  [facility]  [address], [place] [address]  [phone] (office) / [phone] (fax)    Patient Name:  Jada Fuchs  :  1970  MR #:  [id number]  Acct #:  [id number]    Date of Neurology Consult: 2022  Name of Neurologist: Ayala Lott D.O, ABPN, AOBNP, ABEM    Other Physicians:  Dary Ace MD (Primary Care Physician); Dary Ace MD (Referring)      Chief Complaint: Pain (Hands and feet) and Numbness (Hands and feet)      History of Present Illness (HPI):  Jada Fuchs is a 52 y.o. female referred by her PCP for consultation regarding intermittent numbness and tingling in the hands and feet.    Patient states she has been having tingling and numbness that is painful in her legs and her hands.  They can also feel cold.  It will sometimes go up her arm.  It has been present for almost 2 years.  It was off and on, but now in the last 6 months it has been persistent.  It has always been arms and legs since the onset.  Every time it comes back it is the same pattern.     She had tried to figure out if anything was a trigger for it.  She analyzed her medications.  She had been on amlodipine but has since stopped it.  This was for hypertension she had while on Adipex for weight loss.  She was prescribed Gabapentin, but she did not take it.  She states she does not tolerate medication well.  She states she spoke to her PCP who wanted her to get an opinion on her symptoms before she took the Gabapentin.    She cannot identify any event that seem to occur around the onset of symptoms.      She states she had similar 18y ago when she had her daughter.  She states they did an EMG and nothing came of it.      She states the worst part of this is her difficulty sleeping due to the symptoms.    She feels she is weaker opening jars or cans.      Treatment to date:    Gabapentin - prescribed but not taken    Review of Systems:    General: Weight gain: No, Weight Loss: No, Fatigue: No,   Fever: No, Chills: Yes, Night Sweats: Yes, Insomnia: Yes, Excessive sleeping: No   Respiratory:  Cough: No, Shortness of Breath: No,   Wheezing: No, Excessive Snoring: No, Coughing up blood: No  Endocrine: Heat Intolerance: No, Cold Intolerance: No,   Excessive Thirst: No, Excessive Hunger: No,   Eyes:  Blurred Vision: Yes, Double Vision: No,   Light Sensitivity: No, Eye pain: No  Musculoskeletal: Muscle Aches/Pain: Yes, Joint Pain/Swelling: Yes, Muscle Cramps: No, Muscle Weakness: Yes, Neck Pain: Yes, Back Pain: No   Neurological: Difficulty Walking/Falls: No, Headache Migraine: No, Dizziness/Vertigo: No, Fainting: No, Difficulty with Speech: No, Weakness: No, Tingling/Numbness: Yes, Tremors: No, Memory Problems: No, Seizures: No, Difficulty Swallowing: No, Altered Taste: No.  Cardiovascular: Chest Pain: No, Shortness of Breath: No,   Palpitations: No,  Gastrointestinal: Nausea/Vomiting: No, Constipation: No, Diarrhea: No, Bloody Stools: No   Psych/Cog:  Depression: No, Anxiety: No, Hallucinations: No, Problems Concentrating: Yes  : Frequent Urination: No, Incontinence: No, Blood of Urine: No, Urinary Infections: No, Changes in Sex Drive: No   ENT:Hearing Loss: No, Earache: No, Ringing in Ears: No,   Facial Pain: No, Chronic Congestion: No   Immune: Seasonal Allergies: No, Hives and/or Rashes: No  The remainder of the review of twelve body systems was reviewed and normal.    Past Medical, Surgical, Family & Social History:   Past Medical History:   Diagnosis Date    Abdominal pain     Anemia     probably secondary to fibroid    Blood transfusion     History of uterine fibroid     Hypothyroidism     Wears glasses     CONTACS     Past Surgical History:   Procedure Laterality Date    BREAST BIOPSY Left     EXC    BREAST BIOPSY Left     NEEDLE    BREAST SURGERY      mass removed    HYSTERECTOMY  2015     Family History   Problem Relation Age of Onset     "Cancer Maternal Grandfather         prostate    Hypertension Mother     Seizures Maternal Grandmother     Aneurysm Father     Eczema Sister     Melanoma Neg Hx      Alcohol use:  reports current alcohol use.   (Of note, 0.6 oz = 1 beer or 6 oz = 10 beers).  Tobacco use:  reports that she has never smoked. She has never used smokeless tobacco.  Street drug use:  reports no history of drug use.  Allergies: Adderall [dextroamphetamine-amphetamine] and Lisinopril.    Home Medications:     Current Outpatient Medications:     multivit-min/iron/folic/[id number] (HAIR, SKIN AND NAILS ADVANCED ORAL), Take by mouth., Disp: , Rfl:     amLODIPine (NORVASC) 10 MG tablet, Take 1 tablet (10 mg total) by mouth once daily. (Patient not taking: Reported on 9/13/2022), Disp: 30 tablet, Rfl: 2    Physical Examination:  BP (!) 177/104 (BP Location: Left arm, Patient Position: Sitting, BP Method: Small (Automatic))   Pulse 88   Ht 5' 1" (1.549 m)   Wt 60.2 kg (132 lb 13.2 oz)   LMP 08/14/2013   BMI 25.10 kg/m²     GENERAL:  General appearance: Well, non-toxic appearing.  No apparent distress.  Fundi exam: normal.  Neck: supple.  Carotid auscultation: normal.  Heart auscultation: normal.  Peripheral pulses: normal.  Extremities: normal.    MENTAL STATUS:  Alertness, attention span & concentration: normal.  Language: normal.  Orientation to self, place & time:  normal.  Memory, recent & remote: normal.  Fund of knowledge: normal.    SPEECH:  Clear and fluent.  Follows complex commands.    CRANIAL NERVES:  Cranial Nerves II-XII were examined.  II - Visual fields: normal.  III, IV, VI: PERRL, EOMI, No ptosis, No nystagmus.  V - Facial sensation: normal.  VII - Face symmetry & mobility: normal.  VIII - Hearing: normal.  IX, X - Palate: mobile & midline.  XI - Shoulder shrug: normal.  XII - Tongue protrusion: normal.    GROSS MOTOR:  Gait & station: normal.  Tone: normal.  Abnormal movements: none.  Finger-nose & Heel-knee-shin: " normal.  Rapid alternating movements & drift: normal.    MUSCLE STRENGTH:     Fascics Atrophy RIGHT    LEFT Atrophy Fascics     5 Neck Ext. 5       5 Neck Flex 5       5 Deltoids 5       5 Sh.Ext.Rot. 5       5 Sh.Int.Rot. 5       5 Biceps 5       5 Triceps 5       5 Forearm.Pr. 5       5 Wrist Ext. 5       5 Wrist Flex 5       5 Finger Ext. 5       5 Finger Flex 5       5 FPL 5       5 Inteross. 5                         5 Iliopsoas 5       5 Hip Abduct 5       5 Hip Adduct 5       5 Quads 5       5 Hams 5       5 Dorsiflex 5       5 Plantar Flex 5       5 Ankle Alexsander 5       5 Ankle Invert 5       5 Toe Ext. 5       5 Toe Flex 5                         REFLEXES:    RIGHT Reflex   LEFT   2+ Biceps 2+   2+ Brachiorad. 2+   2+ Triceps 2+        2+ Patellar 2+   2+ Ankle 2+        Down PLANTAR Down     SENSORY:  Light touch: Normal throughout.  Sharp touch: Normal throughout.  Vibration: Normal throughout.    Diagnostic Data Reviewed:   Component      Latest Ref Rng & Units 12/9/2021 3/4/2021   WBC      3.90 - 12.70 K/uL 3.37 (L)    RBC      4.00 - 5.40 M/uL 4.18    Hemoglobin      12.0 - 16.0 g/dL 12.6    Hematocrit      37.0 - 48.5 % 38.8    MCV      82 - 98 fL 93    MCH      27.0 - 31.0 pg 30.1    MCHC      32.0 - 36.0 g/dL 32.5    RDW      11.5 - 14.5 % 12.7    Platelets      150 - 450 K/uL 156    MPV      9.2 - 12.9 fL 12.5    Immature Granulocytes      0.0 - 0.5 % 0.3    Gran # (ANC)      1.8 - 7.7 K/uL 1.8    Immature Grans (Abs)      0.00 - 0.04 K/uL 0.01    Lymph #      1.0 - 4.8 K/uL 1.3    Mono #      0.3 - 1.0 K/uL 0.3    Eos #      0.0 - 0.5 K/uL 0.1    Baso #      0.00 - 0.20 K/uL 0.01    nRBC      0 /100 WBC 0    Gran %      38.0 - 73.0 % 51.9    Lymph %      18.0 - 48.0 % 37.4    Mono %      4.0 - 15.0 % 8.6    Eosinophil %      0.0 - 8.0 % 1.5    Basophil %      0.0 - 1.9 % 0.3    Differential Method       Automated    Sodium      136 - 145 mmol/L 139    Potassium      3.5 - 5.1 mmol/L 3.9     Chloride      95 - 110 mmol/L 104    CO2      23 - 29 mmol/L 28    Glucose      70 - 110 mg/dL 86    BUN      6 - 20 mg/dL 14    Creatinine      0.5 - 1.4 mg/dL 0.8    Calcium      8.7 - 10.5 mg/dL 8.9    PROTEIN TOTAL      6.0 - 8.4 g/dL 7.3    Albumin      3.5 - 5.2 g/dL 4.2    BILIRUBIN TOTAL      0.1 - 1.0 mg/dL 0.8    Alkaline Phosphatase      55 - 135 U/L 47 (L)    AST      10 - 40 U/L 24    ALT      10 - 44 U/L 21    Anion Gap      8 - 16 mmol/L 7 (L)    eGFR if African American      >60 mL/min/1.73 m:2 >60.0    eGFR if non African American      >60 mL/min/1.73 m:2 >60.0    HIV Screen 4th Generation wRfx      Non Reactive  Non Reactive   ANTONY        Negative   Sed Rate      0 - 20 mm/Hr  9   Vitamin B-12      210 - 950 pg/mL 569    Vitamin B6      2.0 - 32.8 ug/L 51.2 (H)          Assessment and Plan:  Jada Fuchs is a 52 y.o. woman with abnormal sensation in the hands and feet of unclear etiology.  Exam is unremarkable.  This could be consistent with small fiber neuropathy.  Unfortunately most do not have a treatment, just pain management.  Will pursue further workup to see if there is any other explanation for her symptoms.    Per patient AVS:  Will plan to get an EMG to look at the function of your nerves in the arms and legs.    Will get blood work to look for potential causes of abnormal sensation.        Important to note, also  has a past medical history of Abdominal pain, Anemia, Blood transfusion, History of uterine fibroid, Hypothyroidism, and Wears glasses.    Time Spent: I spent a total of 55 minutes on the day of the visit.This includes face to face time and non-face to face time preparing to see the patient (eg, review of tests), Obtaining and/or reviewing separately obtained history, Documenting clinical information in the electronic or other health record, Independently interpreting resultsand communicating results to the patient/family/caregiver, or Care coordination.         Ayala  NELI Lott D.O, ABPN, AOBNP, ABEM    This note was created with voice recognition software.  Grammatical, syntax and spelling errors may be inevitable.

## 2022-09-13 NOTE — PATIENT INSTRUCTIONS
Will plan to get an EMG to look at the function of your nerves in the arms and legs.      Will get blood work to look for potential causes of abnormal sensation.

## 2022-09-14 LAB
ALBUMIN SERPL ELPH-MCNC: 4.16 G/DL (ref 3.35–5.55)
ALPHA1 GLOB SERPL ELPH-MCNC: 0.26 G/DL (ref 0.17–0.41)
ALPHA2 GLOB SERPL ELPH-MCNC: 0.69 G/DL (ref 0.43–0.99)
ANA SER QL IF: NORMAL
B-GLOBULIN SERPL ELPH-MCNC: 0.67 G/DL (ref 0.5–1.1)
GAMMA GLOB SERPL ELPH-MCNC: 1.01 G/DL (ref 0.67–1.58)
INTERPRETATION SERPL IFE-IMP: NORMAL
PATHOLOGIST INTERPRETATION IFE: NORMAL
PATHOLOGIST INTERPRETATION SPE: NORMAL
PROT SERPL-MCNC: 6.8 G/DL (ref 6–8.4)

## 2022-09-15 LAB
ANTI-SSA ANTIBODY: 0.06 RATIO (ref 0–0.99)
ANTI-SSA INTERPRETATION: NEGATIVE
ANTI-SSB ANTIBODY: 0.06 RATIO (ref 0–0.99)
ANTI-SSB INTERPRETATION: NEGATIVE

## 2022-09-16 ENCOUNTER — TELEPHONE (OUTPATIENT)
Dept: NEUROLOGY | Facility: CLINIC | Age: 52
End: 2022-09-16
Payer: COMMERCIAL

## 2022-09-16 LAB
ANCA AB TITR SER IF: NORMAL TITER
P-ANCA TITR SER IF: NORMAL TITER

## 2022-09-16 NOTE — TELEPHONE ENCOUNTER
Spoke with patient regarding the EMG scheduled on 12/12/22.  Test had to be reschedule since doctor will be out of the office.  Advise patient the test on that day is cancelled and will call her back to reschedule.  She agreed and undertood.

## 2022-09-19 LAB
PYRIDOXAL SERPL-MCNC: 42 UG/L (ref 5–50)
VIT B1 BLD-MCNC: 32 UG/L (ref 38–122)

## 2022-10-12 VITALS — SYSTOLIC BLOOD PRESSURE: 139 MMHG | DIASTOLIC BLOOD PRESSURE: 90 MMHG

## 2022-10-12 RX ORDER — AMLODIPINE BESYLATE 10 MG/1
10 TABLET ORAL DAILY
Qty: 30 TABLET | Refills: 2 | OUTPATIENT
Start: 2022-10-12 | End: 2022-11-28

## 2022-10-12 NOTE — TELEPHONE ENCOUNTER
Spoke with patient   She has been off of Amlodipine for a couple of weeks.   Her blood pressure 139/90 today.  Do you want her to continue the medication   Medication pended if needed.
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
## 2022-11-28 ENCOUNTER — OFFICE VISIT (OUTPATIENT)
Dept: FAMILY MEDICINE | Facility: CLINIC | Age: 52
End: 2022-11-28
Payer: COMMERCIAL

## 2022-11-28 VITALS
HEART RATE: 90 BPM | OXYGEN SATURATION: 99 % | RESPIRATION RATE: 14 BRPM | WEIGHT: 131 LBS | DIASTOLIC BLOOD PRESSURE: 84 MMHG | BODY MASS INDEX: 24.73 KG/M2 | TEMPERATURE: 98 F | HEIGHT: 61 IN | SYSTOLIC BLOOD PRESSURE: 128 MMHG

## 2022-11-28 DIAGNOSIS — R51.9 NEW ONSET OF HEADACHES: ICD-10-CM

## 2022-11-28 DIAGNOSIS — I10 ESSENTIAL HYPERTENSION, BENIGN: ICD-10-CM

## 2022-11-28 DIAGNOSIS — Z12.31 ENCOUNTER FOR SCREENING MAMMOGRAM FOR BREAST CANCER: Primary | ICD-10-CM

## 2022-11-28 DIAGNOSIS — M62.838 CERVICAL PARASPINAL MUSCLE SPASM: ICD-10-CM

## 2022-11-28 PROCEDURE — 3008F PR BODY MASS INDEX (BMI) DOCUMENTED: ICD-10-PCS | Mod: CPTII,S$GLB,, | Performed by: INTERNAL MEDICINE

## 2022-11-28 PROCEDURE — 3008F BODY MASS INDEX DOCD: CPT | Mod: CPTII,S$GLB,, | Performed by: INTERNAL MEDICINE

## 2022-11-28 PROCEDURE — 3074F SYST BP LT 130 MM HG: CPT | Mod: CPTII,S$GLB,, | Performed by: INTERNAL MEDICINE

## 2022-11-28 PROCEDURE — 3079F DIAST BP 80-89 MM HG: CPT | Mod: CPTII,S$GLB,, | Performed by: INTERNAL MEDICINE

## 2022-11-28 PROCEDURE — 1159F PR MEDICATION LIST DOCUMENTED IN MEDICAL RECORD: ICD-10-PCS | Mod: CPTII,S$GLB,, | Performed by: INTERNAL MEDICINE

## 2022-11-28 PROCEDURE — 99214 OFFICE O/P EST MOD 30 MIN: CPT | Mod: S$GLB,,, | Performed by: INTERNAL MEDICINE

## 2022-11-28 PROCEDURE — 3079F PR MOST RECENT DIASTOLIC BLOOD PRESSURE 80-89 MM HG: ICD-10-PCS | Mod: CPTII,S$GLB,, | Performed by: INTERNAL MEDICINE

## 2022-11-28 PROCEDURE — 3074F PR MOST RECENT SYSTOLIC BLOOD PRESSURE < 130 MM HG: ICD-10-PCS | Mod: CPTII,S$GLB,, | Performed by: INTERNAL MEDICINE

## 2022-11-28 PROCEDURE — 99214 PR OFFICE/OUTPT VISIT, EST, LEVL IV, 30-39 MIN: ICD-10-PCS | Mod: S$GLB,,, | Performed by: INTERNAL MEDICINE

## 2022-11-28 PROCEDURE — 1159F MED LIST DOCD IN RCRD: CPT | Mod: CPTII,S$GLB,, | Performed by: INTERNAL MEDICINE

## 2022-11-28 RX ORDER — AMLODIPINE BESYLATE 5 MG/1
5 TABLET ORAL DAILY
Qty: 30 TABLET | Refills: 11 | Status: SHIPPED | OUTPATIENT
Start: 2022-11-28 | End: 2023-10-03 | Stop reason: SDUPTHER

## 2022-11-28 RX ORDER — CYCLOBENZAPRINE HCL 5 MG
TABLET ORAL
Qty: 60 TABLET | Refills: 0 | Status: SHIPPED | OUTPATIENT
Start: 2022-11-28 | End: 2023-10-03

## 2022-12-16 ENCOUNTER — HOSPITAL ENCOUNTER (OUTPATIENT)
Dept: RADIOLOGY | Facility: HOSPITAL | Age: 52
Discharge: HOME | End: 2022-12-16
Attending: INTERNAL MEDICINE
Payer: COMMERCIAL

## 2022-12-16 VITALS — WEIGHT: 130.94 LBS | BODY MASS INDEX: 24.72 KG/M2 | HEIGHT: 61 IN

## 2022-12-16 DIAGNOSIS — Z12.31 ENCOUNTER FOR SCREENING MAMMOGRAM FOR BREAST CANCER: ICD-10-CM

## 2022-12-16 PROCEDURE — 77063 BREAST TOMOSYNTHESIS BI: CPT | Mod: TC,PO

## 2023-01-16 NOTE — PROGRESS NOTES
SUBJECTIVE:    Patient ID: Jada Fuchs is a 52 y.o. female.    Chief Complaint: No chief complaint on file.    HPI    139/90 Pulse not checked       Jada Fuchs is here for follow-up of her hypertension. Home blood pressure readings:  near cointrol . Salt intake and diet: salt added to cooking. Usual weight: 130. Associated signs and symptoms: none. Patient denies: Use of agents associated with hypertension:. Medication compliance: taking as prescribed.    Saw derm who gave her spironolactone-since she started it there is some dysuria      Lab Visit on 09/13/2022   Component Date Value Ref Range Status    WBC 09/13/2022 4.61  3.90 - 12.70 K/uL Final    RBC 09/13/2022 3.84 (L)  4.00 - 5.40 M/uL Final    Hemoglobin 09/13/2022 11.9 (L)  12.0 - 16.0 g/dL Final    Hematocrit 09/13/2022 35.0 (L)  37.0 - 48.5 % Final    MCV 09/13/2022 91  82 - 98 fL Final    MCH 09/13/2022 31.0  27.0 - 31.0 pg Final    MCHC 09/13/2022 34.0  32.0 - 36.0 g/dL Final    RDW 09/13/2022 14.1  11.5 - 14.5 % Final    Platelets 09/13/2022 158  150 - 450 K/uL Final    MPV 09/13/2022 12.3  9.2 - 12.9 fL Final    Immature Granulocytes 09/13/2022 0.2  0.0 - 0.5 % Final    Gran # (ANC) 09/13/2022 3.4  1.8 - 7.7 K/uL Final    Immature Grans (Abs) 09/13/2022 0.01  0.00 - 0.04 K/uL Final    Lymph # 09/13/2022 0.7 (L)  1.0 - 4.8 K/uL Final    Mono # 09/13/2022 0.4  0.3 - 1.0 K/uL Final    Eos # 09/13/2022 0.0  0.0 - 0.5 K/uL Final    Baso # 09/13/2022 0.02  0.00 - 0.20 K/uL Final    nRBC 09/13/2022 0  0 /100 WBC Final    Gran % 09/13/2022 73.5 (H)  38.0 - 73.0 % Final    Lymph % 09/13/2022 16.1 (L)  18.0 - 48.0 % Final    Mono % 09/13/2022 9.1  4.0 - 15.0 % Final    Eosinophil % 09/13/2022 0.7  0.0 - 8.0 % Final    Basophil % 09/13/2022 0.4  0.0 - 1.9 % Final    Differential Method 09/13/2022 Automated   Final    Sodium 09/13/2022 135 (L)  136 - 145 mmol/L Final    Potassium 09/13/2022 3.8  3.5 - 5.1 mmol/L Final    Chloride 09/13/2022 102   95 - 110 mmol/L Final    CO2 09/13/2022 25  23 - 29 mmol/L Final    Glucose 09/13/2022 80  70 - 110 mg/dL Final    BUN 09/13/2022 7  6 - 20 mg/dL Final    Creatinine 09/13/2022 0.7  0.5 - 1.4 mg/dL Final    Calcium 09/13/2022 9.0  8.7 - 10.5 mg/dL Final    Total Protein 09/13/2022 7.1  6.0 - 8.4 g/dL Final    Albumin 09/13/2022 4.1  3.5 - 5.2 g/dL Final    Total Bilirubin 09/13/2022 0.6  0.1 - 1.0 mg/dL Final    Alkaline Phosphatase 09/13/2022 59  55 - 135 U/L Final    AST 09/13/2022 21  10 - 40 U/L Final    ALT 09/13/2022 14  10 - 44 U/L Final    Anion Gap 09/13/2022 8  8 - 16 mmol/L Final    eGFR 09/13/2022 >60.0  >60 mL/min/1.73 m^2 Final    Vitamin B-12 09/13/2022 533  210 - 950 pg/mL Final    Folate 09/13/2022 39.3 (H)  4.0 - 24.0 ng/mL Final    Protein, Serum 09/13/2022 6.8  6.0 - 8.4 g/dL Final    Albumin 09/13/2022 4.16  3.35 - 5.55 g/dL Final    Alpha-1 09/13/2022 0.26  0.17 - 0.41 g/dL Final    Alpha-2 09/13/2022 0.69  0.43 - 0.99 g/dL Final    Beta 09/13/2022 0.67  0.50 - 1.10 g/dL Final    Gamma 09/13/2022 1.01  0.67 - 1.58 g/dL Final    Immunofix Interp. 09/13/2022 SEE COMMENT   Final    Cytoplasmic Neutrophilic Ab 09/13/2022 <1:20  <1:20 Titer Final    Perinuclear (P-ANCA) 09/13/2022 <1:20  <1:20 Titer Final    Vitamin B6 09/13/2022 42  5 - 50 ug/L Final    Thiamine 09/13/2022 32 (L)  38 - 122 ug/L Final    ANTONY Screen 09/13/2022 Negative <1:80  Negative <1:80 Final    Sed Rate 09/13/2022 3  0 - 36 mm/Hr Final    CRP 09/13/2022 2.2  0.0 - 8.2 mg/L Final    Anti-SSA Antibody 09/13/2022 0.06  0.00 - 0.99 Ratio Final    Anti-SSA Interpretation 09/13/2022 Negative  Negative Final    Anti-SSB Antibody 09/13/2022 0.06  0.00 - 0.99 Ratio Final    Anti-SSB Interpretation 09/13/2022 Negative  Negative Final    Pathologist Interpretation SIMONA 09/13/2022 REVIEWED   Final    Pathologist Interpretation SPE 09/13/2022 REVIEWED   Final   Admission on 05/20/2022, Discharged on 05/21/2022   Component Date Value Ref Range  Status    WBC 05/20/2022 5.05  3.90 - 12.70 K/uL Final    RBC 05/20/2022 4.47  4.00 - 5.40 M/uL Final    Hemoglobin 05/20/2022 13.3  12.0 - 16.0 g/dL Final    Hematocrit 05/20/2022 39.4  37.0 - 48.5 % Final    MCV 05/20/2022 88  82 - 98 fL Final    MCH 05/20/2022 29.8  27.0 - 31.0 pg Final    MCHC 05/20/2022 33.8  32.0 - 36.0 g/dL Final    RDW 05/20/2022 12.3  11.5 - 14.5 % Final    Platelets 05/20/2022 162  150 - 450 K/uL Final    MPV 05/20/2022 12.9  9.2 - 12.9 fL Final    Immature Granulocytes 05/20/2022 0.0  0.0 - 0.5 % Final    Gran # (ANC) 05/20/2022 3.1  1.8 - 7.7 K/uL Final    Immature Grans (Abs) 05/20/2022 0.00  0.00 - 0.04 K/uL Final    Lymph # 05/20/2022 1.6  1.0 - 4.8 K/uL Final    Mono # 05/20/2022 0.4  0.3 - 1.0 K/uL Final    Eos # 05/20/2022 0.1  0.0 - 0.5 K/uL Final    Baso # 05/20/2022 0.02  0.00 - 0.20 K/uL Final    nRBC 05/20/2022 0  0 /100 WBC Final    Gran % 05/20/2022 60.4  38.0 - 73.0 % Final    Lymph % 05/20/2022 30.9  18.0 - 48.0 % Final    Mono % 05/20/2022 6.9  4.0 - 15.0 % Final    Eosinophil % 05/20/2022 1.4  0.0 - 8.0 % Final    Basophil % 05/20/2022 0.4  0.0 - 1.9 % Final    Differential Method 05/20/2022 Automated   Final    Sodium 05/20/2022 136  136 - 145 mmol/L Final    Potassium 05/20/2022 3.5  3.5 - 5.1 mmol/L Final    Chloride 05/20/2022 104  95 - 110 mmol/L Final    CO2 05/20/2022 23  23 - 29 mmol/L Final    Glucose 05/20/2022 91  70 - 110 mg/dL Final    BUN 05/20/2022 14  6 - 20 mg/dL Final    Creatinine 05/20/2022 0.7  0.5 - 1.4 mg/dL Final    Calcium 05/20/2022 9.0  8.7 - 10.5 mg/dL Final    Total Protein 05/20/2022 7.5  6.0 - 8.4 g/dL Final    Albumin 05/20/2022 4.5  3.5 - 5.2 g/dL Final    Total Bilirubin 05/20/2022 0.7  0.1 - 1.0 mg/dL Final    Alkaline Phosphatase 05/20/2022 55  55 - 135 U/L Final    AST 05/20/2022 20  10 - 40 U/L Final    ALT 05/20/2022 16  10 - 44 U/L Final    Anion Gap 05/20/2022 9  8 - 16 mmol/L Final    eGFR if African American 05/20/2022 >60.0   >60 mL/min/1.73 m^2 Final    eGFR if non African American 05/20/2022 >60.0  >60 mL/min/1.73 m^2 Final    TSH 05/20/2022 2.930  0.340 - 5.600 uIU/mL Final    Troponin I 05/20/2022 <0.030  <=0.040 ng/mL Final    Specimen UA 05/20/2022 Urine, Clean Catch   Final    Color, UA 05/20/2022 Yellow  Yellow, Straw, Caterina Final    Appearance, UA 05/20/2022 Clear  Clear Final    pH, UA 05/20/2022 7.0  5.0 - 8.0 Final    Specific Gravity, UA 05/20/2022 1.025  1.005 - 1.030 Final    Protein, UA 05/20/2022 Negative  Negative Final    Glucose, UA 05/20/2022 Negative  Negative Final    Ketones, UA 05/20/2022 Negative  Negative Final    Bilirubin (UA) 05/20/2022 Negative  Negative Final    Occult Blood UA 05/20/2022 Negative  Negative Final    Nitrite, UA 05/20/2022 Negative  Negative Final    Urobilinogen, UA 05/20/2022 Negative  Negative EU/dL Final    Leukocytes, UA 05/20/2022 Negative  Negative Final    Magnesium 05/20/2022 1.9  1.6 - 2.6 mg/dL Final    BNP 05/20/2022 12  0 - 99 pg/mL Final    Troponin I 05/20/2022 <0.030  <=0.040 ng/mL Final    POC Preg Test, Ur 05/20/2022 Negative  Negative Final     Acceptable 05/20/2022 Yes   Final   Office Visit on 05/20/2022   Component Date Value Ref Range Status    POC Blood, Urine 05/20/2022 Negative  Negative Final    POC Bilirubin, Urine 05/20/2022 Negative  Negative Final    POC Urobilinogen, Urine 05/20/2022 normal  0.1 - 1.1 Final    POC Ketones, Urine 05/20/2022 Negative  Negative Final    POC Protein, Urine 05/20/2022 Negative  Negative Final    POC Nitrates, Urine 05/20/2022 Negative  Negative Final    POC Glucose, Urine 05/20/2022 Negative  Negative Final    pH, UA 05/20/2022 5.0   Final    POC Specific Gravity, Urine 05/20/2022 1.025  1.003 - 1.029 Final    POC Leukocytes, Urine 05/20/2022 Negative  Negative Final       Past Medical History:   Diagnosis Date    Abdominal pain     Anemia     probably secondary to fibroid    Blood transfusion     History of uterine  fibroid     Hypothyroidism     Wears glasses     CONTACS     Past Surgical History:   Procedure Laterality Date    BREAST BIOPSY Left     EXC    BREAST BIOPSY Left     NEEDLE    BREAST SURGERY      mass removed    HYSTERECTOMY  2015     Family History   Problem Relation Age of Onset    Cancer Maternal Grandfather         prostate    Hypertension Mother     Seizures Maternal Grandmother     Aneurysm Father     Eczema Sister     Melanoma Neg Hx        Marital Status:   Alcohol History:  reports current alcohol use.  Tobacco History:  reports that she has never smoked. She has never used smokeless tobacco.  Drug History:  reports no history of drug use.    Review of patient's allergies indicates:   Allergen Reactions    Adderall [dextroamphetamine-amphetamine]      Severe sx hypertension    Lisinopril      UNDESIRABLE SIDE EFFECT       Current Outpatient Medications:     amLODIPine (NORVASC) 5 MG tablet, Take 1 tablet (5 mg total) by mouth once daily., Disp: 30 tablet, Rfl: 11    cyclobenzaprine (FLEXERIL) 5 MG tablet, One am one afternoon and two bedtime, Disp: 60 tablet, Rfl: 0    multivit-min/iron/folic/aat831 (HAIR, SKIN AND NAILS ADVANCED ORAL), Take by mouth., Disp: , Rfl:     Review of Systems   Constitutional:  Negative for activity change, appetite change, chills, fatigue, fever and unexpected weight change.   HENT:  Negative for congestion, ear pain, hearing loss, postnasal drip, sinus pain, sneezing, sore throat and trouble swallowing.    Eyes:  Negative for pain, discharge and visual disturbance.   Respiratory:  Negative for cough, choking, shortness of breath and wheezing.    Cardiovascular:  Negative for chest pain, palpitations and leg swelling.   Gastrointestinal:  Negative for abdominal distention, abdominal pain, blood in stool, constipation, diarrhea, nausea and vomiting.   Endocrine: Negative for cold intolerance and heat intolerance.   Genitourinary:  Negative for difficulty urinating,  dysuria, frequency, pelvic pain and urgency.   Musculoskeletal:  Negative for back pain, joint swelling and neck pain.   Skin:  Negative for pallor and rash.   Allergic/Immunologic: Negative for environmental allergies and food allergies.   Neurological:  Negative for dizziness, tremors, weakness, numbness and headaches.   Hematological:  Does not bruise/bleed easily.   Psychiatric/Behavioral:  Negative for agitation, confusion, dysphoric mood, sleep disturbance and suicidal ideas. The patient is not nervous/anxious.         Objective:      Vitals    Vitals - 1 value per visit 10/12/2022 11/28/2022 11/28/2022 11/28/2022 12/16/2022   SYSTOLIC 139 - 140 128 -   DIASTOLIC 90 - 82 84 -   Pulse - - 90 - -   Temp - - 98.2 - -   Resp - - 14 - -   SPO2 - - 99 - -   Weight (lb) - - 131 - 130.95   Weight (kg) - - 59.421 - 59.4   Height - - 61 - 61   BMI (Calculated) - - 24.8 - 24.8   VISIT REPORT - - - - -   Pain Score  - 0 - - -       Physical Exam      Assessment:       1. Dysuria    2. Essential hypertension, benign         Plan:       Dysuria  -     Urinalysis, Reflex to Urine Culture Urine, Clean Catch    Essential hypertension, benign        -     controlled      Follow up in about 6 months (around 7/18/2023) for BP.        1/18/2023 Dary Ace M.D.

## 2023-01-18 ENCOUNTER — OFFICE VISIT (OUTPATIENT)
Dept: FAMILY MEDICINE | Facility: CLINIC | Age: 53
End: 2023-01-18
Payer: COMMERCIAL

## 2023-01-18 DIAGNOSIS — I10 ESSENTIAL HYPERTENSION, BENIGN: ICD-10-CM

## 2023-01-18 DIAGNOSIS — R30.0 DYSURIA: Primary | ICD-10-CM

## 2023-01-18 PROCEDURE — 1160F RVW MEDS BY RX/DR IN RCRD: CPT | Mod: CPTII,95,, | Performed by: INTERNAL MEDICINE

## 2023-01-18 PROCEDURE — 1160F PR REVIEW ALL MEDS BY PRESCRIBER/CLIN PHARMACIST DOCUMENTED: ICD-10-PCS | Mod: CPTII,95,, | Performed by: INTERNAL MEDICINE

## 2023-01-18 PROCEDURE — 99213 PR OFFICE/OUTPT VISIT, EST, LEVL III, 20-29 MIN: ICD-10-PCS | Mod: 95,,, | Performed by: INTERNAL MEDICINE

## 2023-01-18 PROCEDURE — 1159F PR MEDICATION LIST DOCUMENTED IN MEDICAL RECORD: ICD-10-PCS | Mod: CPTII,95,, | Performed by: INTERNAL MEDICINE

## 2023-01-18 PROCEDURE — 99213 OFFICE O/P EST LOW 20 MIN: CPT | Mod: 95,,, | Performed by: INTERNAL MEDICINE

## 2023-01-18 PROCEDURE — 1159F MED LIST DOCD IN RCRD: CPT | Mod: CPTII,95,, | Performed by: INTERNAL MEDICINE

## 2023-01-23 ENCOUNTER — LAB VISIT (OUTPATIENT)
Dept: LAB | Facility: HOSPITAL | Age: 53
End: 2023-01-23
Attending: INTERNAL MEDICINE
Payer: COMMERCIAL

## 2023-01-23 DIAGNOSIS — R30.0 DYSURIA: Primary | ICD-10-CM

## 2023-01-23 LAB
BACTERIA #/AREA URNS HPF: NEGATIVE /HPF
BILIRUB UR QL STRIP: NEGATIVE
CLARITY UR: CLEAR
COLOR UR: YELLOW
GLUCOSE UR QL STRIP: NEGATIVE
HGB UR QL STRIP: NEGATIVE
HYALINE CASTS #/AREA URNS LPF: 11 /LPF
KETONES UR QL STRIP: NEGATIVE
LEUKOCYTE ESTERASE UR QL STRIP: ABNORMAL
MICROSCOPIC COMMENT: ABNORMAL
NITRITE UR QL STRIP: NEGATIVE
PH UR STRIP: 6 [PH] (ref 5–8)
PROT UR QL STRIP: NEGATIVE
RBC #/AREA URNS HPF: 2 /HPF (ref 0–4)
SP GR UR STRIP: 1.02 (ref 1–1.03)
SQUAMOUS #/AREA URNS HPF: 5 /HPF
URN SPEC COLLECT METH UR: ABNORMAL
UROBILINOGEN UR STRIP-ACNC: NEGATIVE EU/DL
WBC #/AREA URNS HPF: 17 /HPF (ref 0–5)

## 2023-01-23 PROCEDURE — 87086 URINE CULTURE/COLONY COUNT: CPT | Performed by: INTERNAL MEDICINE

## 2023-01-23 PROCEDURE — 81001 URINALYSIS AUTO W/SCOPE: CPT | Performed by: INTERNAL MEDICINE

## 2023-01-25 LAB
BACTERIA UR CULT: NORMAL
BACTERIA UR CULT: NORMAL

## 2023-10-01 NOTE — PROGRESS NOTES
SUBJECTIVE:    Patient ID: Jada Fuchs is a 53 y.o. female.    Chief Complaint: Back Pain, Follow-up, and Medication Refill    HPI    Patient complaining of head neck and back pain-in November she had an accident -she was seeing a chiro with treatments-in May she had a MRI-chiro told her she had bulging disc and deg discs and she was sent to a pain specialist-some nerve compression-she developed some back pain-upper back-and mid back pain that radiates up-her hands are numb and sometimes she can feel pain in the thighs-not allowing her to stand or sit too long-    She has had a cervical injection which helped some -shes scheduled for thoracic injection-also she is scheduled to see a neuro-surgeon    She is describing  pain above the left ear that feels like an earache -that does not want to go away-with the first injection the pain is less constant and more intermittent-she did not like gabapentin-  She asks re shingles neuropathy-she is googling it-she is actually describing a recurrent lesion in the genital area    And at such times she feels the sx in the lower extremities    Lab Visit on 01/23/2023   Component Date Value Ref Range Status    Specimen UA 01/23/2023 Urine, Clean Catch   Final    Color, UA 01/23/2023 Yellow  Yellow, Straw, Caterina Final    Appearance, UA 01/23/2023 Clear  Clear Final    pH, UA 01/23/2023 6.0  5.0 - 8.0 Final    Specific Gravity, UA 01/23/2023 1.020  1.005 - 1.030 Final    Protein, UA 01/23/2023 Negative  Negative Final    Glucose, UA 01/23/2023 Negative  Negative Final    Ketones, UA 01/23/2023 Negative  Negative Final    Bilirubin (UA) 01/23/2023 Negative  Negative Final    Occult Blood UA 01/23/2023 Negative  Negative Final    Nitrite, UA 01/23/2023 Negative  Negative Final    Urobilinogen, UA 01/23/2023 Negative  Negative EU/dL Final    Leukocytes, UA 01/23/2023 3+ (A)  Negative Final    RBC, UA 01/23/2023 2  0 - 4 /hpf Final    WBC, UA 01/23/2023 17 (H)  0 - 5 /hpf Final     Bacteria 01/23/2023 Negative  None-Occ /hpf Final    Squam Epithel, UA 01/23/2023 5  /hpf Final    Hyaline Casts, UA 01/23/2023 11 (A)  0-1/lpf /lpf Final    Microscopic Comment 01/23/2023 SEE COMMENT   Final    Urine Culture, Routine 01/23/2023 Multiple organisms isolated. None in predominance.  Repeat if   Final    Urine Culture, Routine 01/23/2023 clinically necessary.   Final       Past Medical History:   Diagnosis Date    Abdominal pain     Anemia     probably secondary to fibroid    Blood transfusion     History of uterine fibroid     Hypothyroidism     Wears glasses     CONTACS     Past Surgical History:   Procedure Laterality Date    BREAST BIOPSY Left     EXC    BREAST BIOPSY Left     NEEDLE    BREAST SURGERY      mass removed    HYSTERECTOMY  2015     Family History   Problem Relation Age of Onset    Cancer Maternal Grandfather         prostate    Hypertension Mother     Seizures Maternal Grandmother     Aneurysm Father     Eczema Sister     Melanoma Neg Hx        Marital Status:   Alcohol History:  reports current alcohol use.  Tobacco History:  reports that she has never smoked. She has never used smokeless tobacco.  Drug History:  reports no history of drug use.    Review of patient's allergies indicates:   Allergen Reactions    Adderall [dextroamphetamine-amphetamine]      Severe sx hypertension    Lisinopril      UNDESIRABLE SIDE EFFECT       Current Outpatient Medications:     multivit-min/iron/folic/rec455 (HAIR, SKIN AND NAILS ADVANCED ORAL), Take by mouth., Disp: , Rfl:     spironolactone (ALDACTONE) 50 MG tablet, Take 50 mg by mouth Daily., Disp: , Rfl:     amitriptyline (ELAVIL) 10 MG tablet, Take 1 tablet (10 mg total) by mouth nightly as needed for Insomnia., Disp: 30 tablet, Rfl: 1    amLODIPine (NORVASC) 5 MG tablet, Take 1 tablet (5 mg total) by mouth once daily., Disp: 30 tablet, Rfl: 11    cyclobenzaprine (FLEXERIL) 10 MG tablet, One by  mouth three times daily as needed for  muscle spasm, Disp: 30 tablet, Rfl: 0    valACYclovir (VALTREX) 1000 MG tablet, One three times a day for ten days, Disp: 30 tablet, Rfl: 2    Review of Systems   Constitutional:  Negative for appetite change, chills, diaphoresis, fatigue, fever and unexpected weight change.   HENT:  Negative for congestion, ear pain, hearing loss, nosebleeds, postnasal drip, sinus pressure, sinus pain, sneezing, sore throat, tinnitus, trouble swallowing and voice change.    Eyes:  Negative for photophobia, pain, itching and visual disturbance.   Respiratory:  Negative for apnea, cough, chest tightness, shortness of breath, wheezing and stridor.    Cardiovascular:  Negative for chest pain, palpitations and leg swelling.   Gastrointestinal:  Negative for abdominal distention, abdominal pain, blood in stool, constipation, diarrhea, nausea and vomiting.   Endocrine: Negative for cold intolerance, heat intolerance, polydipsia and polyuria.   Genitourinary:  Negative for difficulty urinating, dyspareunia, dysuria, flank pain, frequency, hematuria, menstrual problem, pelvic pain, urgency, vaginal discharge and vaginal pain.   Musculoskeletal:  Positive for back pain and neck pain (arm pain). Negative for arthralgias, joint swelling, myalgias and neck stiffness.   Skin:  Negative for pallor.   Allergic/Immunologic: Negative for environmental allergies and food allergies.   Neurological:  Negative for dizziness, tremors, speech difficulty, weakness, light-headedness and numbness.   Hematological:  Does not bruise/bleed easily.   Psychiatric/Behavioral:  Positive for sleep disturbance (discomfort). Negative for agitation, confusion, decreased concentration and suicidal ideas. The patient is not nervous/anxious.           Objective:          6/28/2022    10:01 AM 9/13/2022     1:07 PM 10/12/2022     9:17 AM 11/28/2022    11:22 AM 12/16/2022     7:17 AM 10/3/2023     9:14 AM   Vitals - 1 value per visit   SYSTOLIC 120 177 139 140  132  "  DIASTOLIC 76 104 90 82  80   Pulse 82 88  90  90   Temp 98.5 °F (36.9 °C)   98.2 °F (36.8 °C)  98.1 °F (36.7 °C)   Resp 14   14  12   SPO2 100 %   99 %  99 %   Weight (lb) 134 132.83  131 130.95 118   Weight (kg) 60.782 60.25  59.421 59.4 53.524   Height 5' 1" (1.549 m) 5' 1" (1.549 m)  5' 1" (1.549 m) 5' 1" (1.549 m) 5' 1" (1.549 m)   BMI (Calculated) 25.3 25.1  24.8 24.8 22.3   Pain Score Zero Zero  Zero  Zero   (    Physical Exam  Vitals and nursing note reviewed.   Constitutional:       Appearance: Normal appearance.   HENT:      Head: Normocephalic and atraumatic.   Eyes:      Extraocular Movements: Extraocular movements intact.      Pupils: Pupils are equal, round, and reactive to light.   Cardiovascular:      Rate and Rhythm: Normal rate and regular rhythm.      Pulses: Normal pulses.      Heart sounds: Normal heart sounds.   Pulmonary:      Effort: Pulmonary effort is normal.      Breath sounds: Normal breath sounds.   Abdominal:      General: There is no distension.      Palpations: Abdomen is soft.   Musculoskeletal:         General: Tenderness present.      Comments: Spasm of the upper traps   Skin:     General: Skin is warm and dry.   Neurological:      Mental Status: She is alert.   Psychiatric:         Mood and Affect: Mood normal.         Behavior: Behavior normal.         Thought Content: Thought content normal.           Assessment:       1. Cervical pain (neck)    2. Genital herpes simplex, unspecified site    3. Essential hypertension, benign    4. Encounter for screening mammogram for breast cancer         Plan:       Cervical pain (neck)  -     amitriptyline (ELAVIL) 10 MG tablet; Take 1 tablet (10 mg total) by mouth nightly as needed for Insomnia.  Dispense: 30 tablet; Refill: 1  -     cyclobenzaprine (FLEXERIL) 10 MG tablet; One by  mouth three times daily as needed for muscle spasm  Dispense: 30 tablet; Refill: 0    Genital herpes simplex, unspecified site  -     valACYclovir (VALTREX) 1000 " MG tablet; One three times a day for ten days  Dispense: 30 tablet; Refill: 2    Essential hypertension, benign  -     amLODIPine (NORVASC) 5 MG tablet; Take 1 tablet (5 mg total) by mouth once daily.  Dispense: 30 tablet; Refill: 11    Encounter for screening mammogram for breast cancer  -     Mammo Digital Screening Bilat; Future; Expected date: 12/18/2023      Follow up in about 6 months (around 4/3/2024).        10/3/2023 Dary Ace M.D.

## 2023-10-03 ENCOUNTER — OFFICE VISIT (OUTPATIENT)
Dept: FAMILY MEDICINE | Facility: CLINIC | Age: 53
End: 2023-10-03
Payer: COMMERCIAL

## 2023-10-03 VITALS
RESPIRATION RATE: 12 BRPM | DIASTOLIC BLOOD PRESSURE: 80 MMHG | OXYGEN SATURATION: 99 % | SYSTOLIC BLOOD PRESSURE: 132 MMHG | HEART RATE: 90 BPM | BODY MASS INDEX: 22.28 KG/M2 | HEIGHT: 61 IN | TEMPERATURE: 98 F | WEIGHT: 118 LBS

## 2023-10-03 DIAGNOSIS — Z12.31 ENCOUNTER FOR SCREENING MAMMOGRAM FOR BREAST CANCER: ICD-10-CM

## 2023-10-03 DIAGNOSIS — I10 ESSENTIAL HYPERTENSION, BENIGN: ICD-10-CM

## 2023-10-03 DIAGNOSIS — A60.00 GENITAL HERPES SIMPLEX, UNSPECIFIED SITE: ICD-10-CM

## 2023-10-03 DIAGNOSIS — M54.2 CERVICAL PAIN (NECK): Primary | ICD-10-CM

## 2023-10-03 PROCEDURE — 3079F DIAST BP 80-89 MM HG: CPT | Mod: CPTII,S$GLB,, | Performed by: INTERNAL MEDICINE

## 2023-10-03 PROCEDURE — 3008F PR BODY MASS INDEX (BMI) DOCUMENTED: ICD-10-PCS | Mod: CPTII,S$GLB,, | Performed by: INTERNAL MEDICINE

## 2023-10-03 PROCEDURE — 3008F BODY MASS INDEX DOCD: CPT | Mod: CPTII,S$GLB,, | Performed by: INTERNAL MEDICINE

## 2023-10-03 PROCEDURE — 3075F PR MOST RECENT SYSTOLIC BLOOD PRESS GE 130-139MM HG: ICD-10-PCS | Mod: CPTII,S$GLB,, | Performed by: INTERNAL MEDICINE

## 2023-10-03 PROCEDURE — 1159F MED LIST DOCD IN RCRD: CPT | Mod: CPTII,S$GLB,, | Performed by: INTERNAL MEDICINE

## 2023-10-03 PROCEDURE — 99214 OFFICE O/P EST MOD 30 MIN: CPT | Mod: S$GLB,,, | Performed by: INTERNAL MEDICINE

## 2023-10-03 PROCEDURE — 1159F PR MEDICATION LIST DOCUMENTED IN MEDICAL RECORD: ICD-10-PCS | Mod: CPTII,S$GLB,, | Performed by: INTERNAL MEDICINE

## 2023-10-03 PROCEDURE — 99214 PR OFFICE/OUTPT VISIT, EST, LEVL IV, 30-39 MIN: ICD-10-PCS | Mod: S$GLB,,, | Performed by: INTERNAL MEDICINE

## 2023-10-03 PROCEDURE — 3075F SYST BP GE 130 - 139MM HG: CPT | Mod: CPTII,S$GLB,, | Performed by: INTERNAL MEDICINE

## 2023-10-03 PROCEDURE — 3079F PR MOST RECENT DIASTOLIC BLOOD PRESSURE 80-89 MM HG: ICD-10-PCS | Mod: CPTII,S$GLB,, | Performed by: INTERNAL MEDICINE

## 2023-10-03 RX ORDER — AMITRIPTYLINE HYDROCHLORIDE 10 MG/1
10 TABLET, FILM COATED ORAL NIGHTLY PRN
Qty: 30 TABLET | Refills: 1 | Status: SHIPPED | OUTPATIENT
Start: 2023-10-03 | End: 2024-02-28 | Stop reason: SDUPTHER

## 2023-10-03 RX ORDER — AMLODIPINE BESYLATE 5 MG/1
5 TABLET ORAL DAILY
Qty: 30 TABLET | Refills: 11 | Status: SHIPPED | OUTPATIENT
Start: 2023-10-03 | End: 2024-10-02

## 2023-10-03 RX ORDER — SPIRONOLACTONE 50 MG/1
50 TABLET, FILM COATED ORAL DAILY
COMMUNITY
Start: 2023-10-02

## 2023-10-03 RX ORDER — VALACYCLOVIR HYDROCHLORIDE 1 G/1
TABLET, FILM COATED ORAL
Qty: 30 TABLET | Refills: 2 | Status: SHIPPED | OUTPATIENT
Start: 2023-10-03

## 2023-10-03 RX ORDER — CYCLOBENZAPRINE HCL 10 MG
TABLET ORAL
Qty: 30 TABLET | Refills: 0 | Status: SHIPPED | OUTPATIENT
Start: 2023-10-03

## 2023-10-21 ENCOUNTER — OFFICE VISIT (OUTPATIENT)
Dept: URGENT CARE | Facility: CLINIC | Age: 53
End: 2023-10-21
Payer: COMMERCIAL

## 2023-10-21 VITALS
OXYGEN SATURATION: 97 % | RESPIRATION RATE: 20 BRPM | HEIGHT: 62 IN | BODY MASS INDEX: 21.16 KG/M2 | DIASTOLIC BLOOD PRESSURE: 89 MMHG | HEART RATE: 91 BPM | TEMPERATURE: 98 F | SYSTOLIC BLOOD PRESSURE: 130 MMHG | WEIGHT: 115 LBS

## 2023-10-21 DIAGNOSIS — H65.02 ACUTE SEROUS OTITIS MEDIA OF LEFT EAR, RECURRENCE NOT SPECIFIED: ICD-10-CM

## 2023-10-21 DIAGNOSIS — J34.89 SINUS PRESSURE: Primary | ICD-10-CM

## 2023-10-21 DIAGNOSIS — B96.89 ACUTE BACTERIAL SINUSITIS: ICD-10-CM

## 2023-10-21 DIAGNOSIS — J01.90 ACUTE BACTERIAL SINUSITIS: ICD-10-CM

## 2023-10-21 LAB
CTP QC/QA: YES
FLUAV AG NPH QL: NEGATIVE
FLUBV AG NPH QL: NEGATIVE

## 2023-10-21 PROCEDURE — 87804 INFLUENZA ASSAY W/OPTIC: CPT | Mod: 59,QW,, | Performed by: NURSE PRACTITIONER

## 2023-10-21 PROCEDURE — 99214 PR OFFICE/OUTPT VISIT, EST, LEVL IV, 30-39 MIN: ICD-10-PCS | Mod: 25,S$GLB,, | Performed by: NURSE PRACTITIONER

## 2023-10-21 PROCEDURE — 87804 POCT INFLUENZA A/B: ICD-10-PCS | Mod: 59,QW,, | Performed by: NURSE PRACTITIONER

## 2023-10-21 PROCEDURE — 99214 OFFICE O/P EST MOD 30 MIN: CPT | Mod: 25,S$GLB,, | Performed by: NURSE PRACTITIONER

## 2023-10-21 PROCEDURE — 96372 PR INJECTION,THERAP/PROPH/DIAG2ST, IM OR SUBCUT: ICD-10-PCS | Mod: S$GLB,,, | Performed by: NURSE PRACTITIONER

## 2023-10-21 PROCEDURE — 96372 THER/PROPH/DIAG INJ SC/IM: CPT | Mod: S$GLB,,, | Performed by: NURSE PRACTITIONER

## 2023-10-21 RX ORDER — FLUTICASONE PROPIONATE 50 MCG
1 SPRAY, SUSPENSION (ML) NASAL DAILY
Qty: 16 G | Refills: 0 | Status: SHIPPED | OUTPATIENT
Start: 2023-10-21

## 2023-10-21 RX ORDER — DEXAMETHASONE SODIUM PHOSPHATE 4 MG/ML
4 INJECTION, SOLUTION INTRA-ARTICULAR; INTRALESIONAL; INTRAMUSCULAR; INTRAVENOUS; SOFT TISSUE
Status: COMPLETED | OUTPATIENT
Start: 2023-10-21 | End: 2023-10-21

## 2023-10-21 RX ORDER — PROMETHAZINE HYDROCHLORIDE AND DEXTROMETHORPHAN HYDROBROMIDE 6.25; 15 MG/5ML; MG/5ML
5 SYRUP ORAL 3 TIMES DAILY PRN
Qty: 240 ML | Refills: 0 | Status: SHIPPED | OUTPATIENT
Start: 2023-10-21 | End: 2023-10-31

## 2023-10-21 RX ORDER — CEFDINIR 300 MG/1
300 CAPSULE ORAL 2 TIMES DAILY
Qty: 20 CAPSULE | Refills: 0 | Status: SHIPPED | OUTPATIENT
Start: 2023-10-21 | End: 2023-10-31

## 2023-10-21 RX ADMIN — DEXAMETHASONE SODIUM PHOSPHATE 4 MG: 4 INJECTION, SOLUTION INTRA-ARTICULAR; INTRALESIONAL; INTRAMUSCULAR; INTRAVENOUS; SOFT TISSUE at 11:10

## 2023-10-21 NOTE — PROGRESS NOTES
"Subjective:      Patient ID: Jada Fuchs is a 53 y.o. female.    Vitals:  height is 5' 2" (1.575 m) and weight is 52.2 kg (115 lb). Her temperature is 98.1 °F (36.7 °C). Her blood pressure is 130/89 and her pulse is 91. Her respiration is 20 and oxygen saturation is 97%.     Chief Complaint: Sinus Problem    Pt states" c/o sinus pressure, nasal congestion, L ear fullness, HA that has been going on for 7 days.Took theraflu and day quil."         Constitution: Positive for fatigue. Negative for chills and fever.   HENT:  Positive for congestion, postnasal drip and sore throat.    Neck: neck negative.   Respiratory:  Positive for cough and sputum production.    Gastrointestinal: Negative.    Genitourinary: Negative.    Neurological:  Positive for headaches.      Objective:     Physical Exam   Constitutional: She is oriented to person, place, and time.   HENT:   Head: Normocephalic and atraumatic.   Ears:   Right Ear: Tympanic membrane normal.   Left Ear: Tympanic membrane normal.   Nose: Congestion present.   Mouth/Throat: Posterior oropharyngeal erythema present.   Eyes: Pupils are equal, round, and reactive to light.   Cardiovascular: Normal rate and regular rhythm.   No murmur heard.  Pulmonary/Chest: Effort normal and breath sounds normal. No respiratory distress.   Abdominal: Normal appearance and bowel sounds are normal. There is no abdominal tenderness.   Neurological: She is alert and oriented to person, place, and time.   Psychiatric: Her behavior is normal. Mood normal.       Assessment:     1. Sinus pressure    2. Acute bacterial sinusitis    3. Acute serous otitis media of left ear, recurrence not specified        Plan:   Flu negative, discussed with patient, Follow up with PCP if symptoms are not resolving in 48-72 hours, follow up immediately for new or worsening symptoms, ED precautions discussed.      Sinus pressure  -     POCT Influenza A/B Rapid Antigen    Acute bacterial sinusitis  -     " dexAMETHasone injection 4 mg  -     cefdinir (OMNICEF) 300 MG capsule; Take 1 capsule (300 mg total) by mouth 2 (two) times daily. for 10 days  Dispense: 20 capsule; Refill: 0  -     fluticasone propionate (FLONASE) 50 mcg/actuation nasal spray; 1 spray (50 mcg total) by Each Nostril route once daily.  Dispense: 16 g; Refill: 0  -     promethazine-dextromethorphan (PROMETHAZINE-DM) 6.25-15 mg/5 mL Syrp; Take 5 mLs by mouth 3 (three) times daily as needed.  Dispense: 240 mL; Refill: 0    Acute serous otitis media of left ear, recurrence not specified  -     dexAMETHasone injection 4 mg  -     cefdinir (OMNICEF) 300 MG capsule; Take 1 capsule (300 mg total) by mouth 2 (two) times daily. for 10 days  Dispense: 20 capsule; Refill: 0  -     fluticasone propionate (FLONASE) 50 mcg/actuation nasal spray; 1 spray (50 mcg total) by Each Nostril route once daily.  Dispense: 16 g; Refill: 0  -     promethazine-dextromethorphan (PROMETHAZINE-DM) 6.25-15 mg/5 mL Syrp; Take 5 mLs by mouth 3 (three) times daily as needed.  Dispense: 240 mL; Refill: 0

## 2023-12-18 ENCOUNTER — HOSPITAL ENCOUNTER (OUTPATIENT)
Dept: RADIOLOGY | Facility: HOSPITAL | Age: 53
Discharge: HOME | End: 2023-12-18
Attending: INTERNAL MEDICINE
Payer: COMMERCIAL

## 2023-12-18 VITALS — WEIGHT: 115.06 LBS | BODY MASS INDEX: 21.17 KG/M2 | HEIGHT: 62 IN

## 2023-12-18 DIAGNOSIS — Z12.31 ENCOUNTER FOR SCREENING MAMMOGRAM FOR BREAST CANCER: ICD-10-CM

## 2023-12-18 PROCEDURE — 77067 SCR MAMMO BI INCL CAD: CPT | Mod: TC,PO

## 2024-02-25 NOTE — PROGRESS NOTES
"  SUBJECTIVE:    Patient ID: Jada Fuchs is a 53 y.o. female.    Chief Complaint: Medication Refill    HPI    3 month follow-up on meds    Patient states she continues with neck and back issues and is now seeing a specialist for same-surgery may be indicated-    Sleep is now attended with OTC rather than amitryptiline-she would like refills-continues to have some restless nights but overall feels good    Patient asks re physical evaluation-grandfather had heart disease    Last 3 sets of Vitals        10/21/2023    10:41 AM 12/18/2023     8:15 AM 2/28/2024    10:08 AM   Vitals - 1 value per visit   SYSTOLIC 130  128   DIASTOLIC 89  80   Pulse 91  73   Temp 98.1 °F (36.7 °C)     Resp 20     SPO2 97 %  100 %   Weight (lb) 115 115.08 110.2   Weight (kg) 52.164 52.2 49.986   Height 5' 2" (1.575 m) 5' 2" (1.575 m) 5' 2" (1.575 m)   BMI (Calculated) 21 21 20.2   Pain Score   Zero          Office Visit on 10/21/2023   Component Date Value Ref Range Status    Rapid Influenza A Ag 10/21/2023 Negative  Negative Final    Rapid Influenza B Ag 10/21/2023 Negative  Negative Final     Acceptable 10/21/2023 Yes   Final         Past Medical History:   Diagnosis Date    Abdominal pain     Anemia     probably secondary to fibroid    Blood transfusion     History of uterine fibroid     Hypothyroidism     Wears glasses     CONTACS     Past Surgical History:   Procedure Laterality Date    BREAST BIOPSY Left     EXC    BREAST BIOPSY Left     NEEDLE    BREAST SURGERY      mass removed    HYSTERECTOMY  2015     Family History   Problem Relation Age of Onset    Cancer Maternal Grandfather         prostate    Hypertension Mother     Seizures Maternal Grandmother     Aneurysm Father     Eczema Sister     Melanoma Neg Hx        Marital Status:   Alcohol History:  reports current alcohol use.  Tobacco History:  reports that she has never smoked. She has never used smokeless tobacco.  Drug History:  reports no history " of drug use.    Review of patient's allergies indicates:   Allergen Reactions    Adderall [dextroamphetamine-amphetamine]      Severe sx hypertension    Lisinopril      UNDESIRABLE SIDE EFFECT       Current Outpatient Medications:     amitriptyline (ELAVIL) 10 MG tablet, Take 1 tablet (10 mg total) by mouth nightly as needed for Insomnia., Disp: 30 tablet, Rfl: 1    amLODIPine (NORVASC) 5 MG tablet, Take 1 tablet (5 mg total) by mouth once daily., Disp: 30 tablet, Rfl: 11    LUMIGAN 0.01 % Drop, INSTILL 1 DROP INTO AFFECTED EYE(S) IN THE EVENING, Disp: , Rfl:     multivit-min/iron/folic/tup941 (HAIR, SKIN AND NAILS ADVANCED ORAL), Take by mouth., Disp: , Rfl:     cyclobenzaprine (FLEXERIL) 10 MG tablet, One by  mouth three times daily as needed for muscle spasm (Patient not taking: Reported on 2/28/2024), Disp: 30 tablet, Rfl: 0    fluticasone propionate (FLONASE) 50 mcg/actuation nasal spray, 1 spray (50 mcg total) by Each Nostril route once daily. (Patient not taking: Reported on 2/28/2024), Disp: 16 g, Rfl: 0    spironolactone (ALDACTONE) 50 MG tablet, Take 50 mg by mouth Daily., Disp: , Rfl:     valACYclovir (VALTREX) 1000 MG tablet, One three times a day for ten days (Patient not taking: Reported on 2/28/2024), Disp: 30 tablet, Rfl: 2    Review of Systems   Constitutional:  Negative for appetite change, chills, diaphoresis, fatigue, fever and unexpected weight change.   HENT:  Negative for congestion, ear pain, hearing loss, nosebleeds, postnasal drip, sinus pressure, sinus pain, sneezing, sore throat, trouble swallowing and voice change.    Eyes:  Negative for photophobia, pain, itching and visual disturbance.   Respiratory:  Negative for apnea, cough, chest tightness, shortness of breath, wheezing and stridor.    Cardiovascular:  Positive for chest pain (? spasms-sharp pain, brief). Negative for palpitations and leg swelling.   Gastrointestinal:  Negative for abdominal distention, abdominal pain, blood in  "stool, constipation, diarrhea, nausea and vomiting.   Endocrine: Negative for cold intolerance, heat intolerance, polydipsia and polyuria.   Genitourinary:  Negative for difficulty urinating, dyspareunia, dysuria, flank pain, frequency, hematuria, menstrual problem, pelvic pain, urgency, vaginal discharge and vaginal pain.   Musculoskeletal:  Positive for back pain and neck pain (with oleft greater than right radiculopathy). Negative for arthralgias, joint swelling, myalgias and neck stiffness.   Skin:  Negative for pallor.   Allergic/Immunologic: Negative for environmental allergies and food allergies.   Neurological:  Negative for dizziness, tremors, speech difficulty, weakness, light-headedness and numbness.        Cervical radiculopathy   Hematological:  Does not bruise/bleed easily.   Psychiatric/Behavioral:  Positive for sleep disturbance. Negative for agitation, confusion, decreased concentration and suicidal ideas. The patient is not nervous/anxious.           Objective:          11/28/2022    11:22 AM 12/16/2022     7:17 AM 10/3/2023     9:14 AM 10/21/2023    10:41 AM 12/18/2023     8:15 AM 2/28/2024    10:08 AM   Vitals - 1 value per visit   SYSTOLIC 140  132 130  128   DIASTOLIC 82  80 89  80   Pulse 90  90 91  73   Temp 98.2 °F (36.8 °C)  98.1 °F (36.7 °C) 98.1 °F (36.7 °C)     Resp 14  12 20     SPO2 99 %  99 % 97 %  100 %   Weight (lb) 131 130.95 118 115 115.08 110.2   Weight (kg) 59.421 59.4 53.524 52.164 52.2 49.986   Height 5' 1" (1.549 m) 5' 1" (1.549 m) 5' 1" (1.549 m) 5' 2" (1.575 m) 5' 2" (1.575 m) 5' 2" (1.575 m)   BMI (Calculated) 24.8 24.8 22.3 21 21 20.2   Pain Score Zero  Zero   Zero   (    Physical Exam  Vitals and nursing note reviewed.   Constitutional:       General: She is not in acute distress.     Appearance: Normal appearance. She is not ill-appearing.   HENT:      Head: Normocephalic and atraumatic.      Nose: Nose normal.      Mouth/Throat:      Mouth: Mucous membranes are moist. "   Eyes:      Conjunctiva/sclera: Conjunctivae normal.      Pupils: Pupils are equal, round, and reactive to light.   Neck:      Vascular: No carotid bruit.      Comments: Thyroid nodule right lower gland  Cardiovascular:      Rate and Rhythm: Normal rate and regular rhythm.      Pulses: Normal pulses.      Heart sounds: Normal heart sounds.   Pulmonary:      Effort: Pulmonary effort is normal.      Breath sounds: Normal breath sounds.   Abdominal:      General: Bowel sounds are normal. There is no distension.      Palpations: Abdomen is soft. There is no mass.      Tenderness: There is no abdominal tenderness.      Hernia: No hernia is present.   Musculoskeletal:      Right lower leg: No edema.      Left lower leg: No edema.   Lymphadenopathy:      Cervical: No cervical adenopathy.   Skin:     General: Skin is warm and dry.      Coloration: Skin is not jaundiced.      Findings: No bruising.   Neurological:      General: No focal deficit present.      Mental Status: She is alert and oriented to person, place, and time.      Cranial Nerves: No cranial nerve deficit.      Sensory: No sensory deficit.      Motor: No weakness.      Coordination: Coordination normal.   Psychiatric:         Mood and Affect: Mood normal.         Thought Content: Thought content normal.         Judgment: Judgment normal.           Assessment:       1. Essential hypertension, benign    2. Pure hypercholesterolemia    3. Cervical pain (neck)    4. Thyroid nodule         Plan:       Essential hypertension, benign  -     CBC Auto Differential; Future; Expected date: 02/28/2024  -     Comprehensive Metabolic Panel; Future; Expected date: 02/28/2024  -     Lipid Panel; Future; Expected date: 02/28/2024  -     Urinalysis Microscopic; Future; Expected date: 02/28/2024    Pure hypercholesterolemia  -     Comprehensive Metabolic Panel; Future; Expected date: 02/28/2024  -     Lipid Panel; Future; Expected date: 02/28/2024    Cervical pain (neck)         -     recommend second opinion if any hesitation on proceedures offered    Thyroid nodule  -     US Soft Tissue Head Neck; Future; Expected date: 02/28/2024      Follow up in about 6 months (around 8/28/2024) for FOLLOW UP LABS, FOLLOW UP MEDICATIONS.        2/28/2024 Dary Ace M.D.

## 2024-02-28 ENCOUNTER — OFFICE VISIT (OUTPATIENT)
Dept: FAMILY MEDICINE | Facility: CLINIC | Age: 54
End: 2024-02-28
Payer: COMMERCIAL

## 2024-02-28 VITALS
HEIGHT: 62 IN | HEART RATE: 73 BPM | WEIGHT: 110.19 LBS | SYSTOLIC BLOOD PRESSURE: 128 MMHG | BODY MASS INDEX: 20.28 KG/M2 | OXYGEN SATURATION: 100 % | DIASTOLIC BLOOD PRESSURE: 80 MMHG

## 2024-02-28 DIAGNOSIS — M54.2 CERVICAL PAIN (NECK): ICD-10-CM

## 2024-02-28 DIAGNOSIS — E04.1 THYROID NODULE: ICD-10-CM

## 2024-02-28 DIAGNOSIS — I10 ESSENTIAL HYPERTENSION, BENIGN: ICD-10-CM

## 2024-02-28 DIAGNOSIS — I10 ESSENTIAL HYPERTENSION, BENIGN: Primary | ICD-10-CM

## 2024-02-28 DIAGNOSIS — E78.00 PURE HYPERCHOLESTEROLEMIA: ICD-10-CM

## 2024-02-28 PROCEDURE — 99999 PR PBB SHADOW E&M-EST. PATIENT-LVL III: CPT | Mod: PBBFAC,,, | Performed by: INTERNAL MEDICINE

## 2024-02-28 PROCEDURE — 99214 OFFICE O/P EST MOD 30 MIN: CPT | Mod: S$GLB,,, | Performed by: INTERNAL MEDICINE

## 2024-02-28 PROCEDURE — 3079F DIAST BP 80-89 MM HG: CPT | Mod: CPTII,S$GLB,, | Performed by: INTERNAL MEDICINE

## 2024-02-28 PROCEDURE — 3074F SYST BP LT 130 MM HG: CPT | Mod: CPTII,S$GLB,, | Performed by: INTERNAL MEDICINE

## 2024-02-28 PROCEDURE — 1159F MED LIST DOCD IN RCRD: CPT | Mod: CPTII,S$GLB,, | Performed by: INTERNAL MEDICINE

## 2024-02-28 PROCEDURE — 3008F BODY MASS INDEX DOCD: CPT | Mod: CPTII,S$GLB,, | Performed by: INTERNAL MEDICINE

## 2024-02-28 RX ORDER — AMITRIPTYLINE HYDROCHLORIDE 10 MG/1
10 TABLET, FILM COATED ORAL NIGHTLY PRN
Qty: 90 TABLET | Refills: 3 | Status: SHIPPED | OUTPATIENT
Start: 2024-02-28 | End: 2025-02-27

## 2024-02-28 RX ORDER — BIMATOPROST 0.1 MG/ML
SOLUTION/ DROPS OPHTHALMIC
COMMUNITY
Start: 2023-12-11

## 2024-08-20 ENCOUNTER — OFFICE VISIT (OUTPATIENT)
Dept: FAMILY MEDICINE | Facility: CLINIC | Age: 54
End: 2024-08-20
Payer: COMMERCIAL

## 2024-08-20 VITALS
DIASTOLIC BLOOD PRESSURE: 80 MMHG | OXYGEN SATURATION: 100 % | WEIGHT: 114.44 LBS | BODY MASS INDEX: 21.06 KG/M2 | HEART RATE: 94 BPM | SYSTOLIC BLOOD PRESSURE: 128 MMHG | HEIGHT: 62 IN | TEMPERATURE: 98 F

## 2024-08-20 DIAGNOSIS — Z76.89 ENCOUNTER TO ESTABLISH CARE: Primary | ICD-10-CM

## 2024-08-20 DIAGNOSIS — I10 ESSENTIAL HYPERTENSION, BENIGN: ICD-10-CM

## 2024-08-20 DIAGNOSIS — E78.5 DYSLIPIDEMIA: ICD-10-CM

## 2024-08-20 PROCEDURE — 3008F BODY MASS INDEX DOCD: CPT | Mod: CPTII,S$GLB,, | Performed by: STUDENT IN AN ORGANIZED HEALTH CARE EDUCATION/TRAINING PROGRAM

## 2024-08-20 PROCEDURE — 3079F DIAST BP 80-89 MM HG: CPT | Mod: CPTII,S$GLB,, | Performed by: STUDENT IN AN ORGANIZED HEALTH CARE EDUCATION/TRAINING PROGRAM

## 2024-08-20 PROCEDURE — 99999 PR PBB SHADOW E&M-EST. PATIENT-LVL IV: CPT | Mod: PBBFAC,,, | Performed by: STUDENT IN AN ORGANIZED HEALTH CARE EDUCATION/TRAINING PROGRAM

## 2024-08-20 PROCEDURE — G2211 COMPLEX E/M VISIT ADD ON: HCPCS | Mod: S$GLB,,, | Performed by: STUDENT IN AN ORGANIZED HEALTH CARE EDUCATION/TRAINING PROGRAM

## 2024-08-20 PROCEDURE — 1159F MED LIST DOCD IN RCRD: CPT | Mod: CPTII,S$GLB,, | Performed by: STUDENT IN AN ORGANIZED HEALTH CARE EDUCATION/TRAINING PROGRAM

## 2024-08-20 PROCEDURE — 99214 OFFICE O/P EST MOD 30 MIN: CPT | Mod: S$GLB,,, | Performed by: STUDENT IN AN ORGANIZED HEALTH CARE EDUCATION/TRAINING PROGRAM

## 2024-08-20 PROCEDURE — 1160F RVW MEDS BY RX/DR IN RCRD: CPT | Mod: CPTII,S$GLB,, | Performed by: STUDENT IN AN ORGANIZED HEALTH CARE EDUCATION/TRAINING PROGRAM

## 2024-08-20 PROCEDURE — 3074F SYST BP LT 130 MM HG: CPT | Mod: CPTII,S$GLB,, | Performed by: STUDENT IN AN ORGANIZED HEALTH CARE EDUCATION/TRAINING PROGRAM

## 2024-08-20 NOTE — PROGRESS NOTES
[facility] Note    Subjective:  Chief Complaint:   Chief Complaint   Patient presents with    Establish Care       History of Present Illness:  Shanice is here for establishing care  Here with daughter     HTN  Amlodipine 5   Spironolactone 50 - no longer taking    Cervical pain  July neck injection that resulted in right arm numbness/pain   MRI scheduled     Insomnia   Amitriptyline 10 qhs - no longer taking     Uptodate on vaccinations.     Screening  Health Maintenance         Date Due Completion Date    High Dose Statin Never done ---    Shingles Vaccine (1 of 2) 10/03/2024 (Originally 5/14/2022) ---    COVID-19 Vaccine (3 - 2023-24 season) 10/03/2024 (Originally 9/1/2023) 4/8/2021    Influenza Vaccine (1) 09/01/2024 11/28/2022 (Declined)    Override on 11/28/2022: Declined    Mammogram 12/18/2024 12/18/2023    Override on 5/31/2017: Done    Lipid Panel 12/09/2026 12/9/2021    Colorectal Cancer Screening 09/01/2027 9/1/2017 (Done)    Override on 9/1/2017: Done    TETANUS VACCINE 05/22/2028 5/22/2018 (Declined)    Override on 5/22/2018: Declined          Immunization History   Administered Date(s) Administered    COVID-19, MRNA, LN-S, PF (MODERNA FULL 0.5 ML DOSE) 03/11/2021, 04/08/2021    Influenza - Quadrivalent 10/20/2020    Influenza - Quadrivalent - PF *Preferred* (6 months and older) 02/26/2020     The 10-year ASCVD risk score (Christelle LEIVA, et al., 2019) is: 2.1%    Values used to calculate the score:      Age: 54 years      Sex: Female      Is Non- : Yes      Diabetic: No      Tobacco smoker: No      Systolic Blood Pressure: 128 mmHg      Is BP treated: Yes      HDL Cholesterol: 90 mg/dL      Total Cholesterol: 189 mg/dL    Allergies:  Review of patient's allergies indicates:   Allergen Reactions    Adderall [dextroamphetamine-amphetamine]      Severe sx hypertension    Lisinopril      UNDESIRABLE SIDE EFFECT       Home Medications:  Current Outpatient Medications on File  Prior to Visit   Medication Sig    amLODIPine (NORVASC) 5 MG tablet Take 1 tablet (5 mg total) by mouth once daily.    multivit-min/iron/folic/pdt494 (HAIR, SKIN AND NAILS ADVANCED ORAL) Take by mouth.    [DISCONTINUED] amitriptyline (ELAVIL) 10 MG tablet Take 1 tablet (10 mg total) by mouth nightly as needed for Insomnia. (Patient not taking: Reported on 8/20/2024)    [DISCONTINUED] cyclobenzaprine (FLEXERIL) 10 MG tablet One by  mouth three times daily as needed for muscle spasm (Patient not taking: Reported on 2/28/2024)    [DISCONTINUED] fluticasone propionate (FLONASE) 50 mcg/actuation nasal spray 1 spray (50 mcg total) by Each Nostril route once daily. (Patient not taking: Reported on 2/28/2024)    [DISCONTINUED] LUMIGAN 0.01 % Drop INSTILL 1 DROP INTO AFFECTED EYE(S) IN THE EVENING (Patient not taking: Reported on 8/20/2024)    [DISCONTINUED] spironolactone (ALDACTONE) 50 MG tablet Take 50 mg by mouth Daily. (Patient not taking: Reported on 8/20/2024)    [DISCONTINUED] valACYclovir (VALTREX) 1000 MG tablet One three times a day for ten days (Patient not taking: Reported on 2/28/2024)     No current facility-administered medications on file prior to visit.       Past Medical History:   Diagnosis Date    Abdominal pain     Anemia     probably secondary to fibroid    Blood transfusion     History of uterine fibroid     Hypothyroidism     Wears glasses     CONTACS     Past Surgical History:   Procedure Laterality Date    BREAST BIOPSY Left     EXC    BREAST BIOPSY Left     NEEDLE    BREAST SURGERY      mass removed    HYSTERECTOMY  2015     Family History   Problem Relation Name Age of Onset    Cancer Maternal Grandfather          prostate    Hypertension Mother      Seizures Maternal Grandmother      Aneurysm Father      Eczema Sister      Melanoma Neg Hx       Social History     Tobacco Use    Smoking status: Never    Smokeless tobacco: Never   Substance Use Topics    Alcohol use: Yes     Comment: OCC    Drug  "use: No            The patient's past medical history, surgical history, social history, family history, allergies and medications have been reviewed.    Review of Systems     10 point review of systems was conducted and only the pertinent positives and pertinent negatives are noted above in the HPI section.    Physical Examination  General appearance: alert, cooperative, no distress  HEENT: normocephalic, atraumatic, PERRLA, TMs visualized bilaterally not impacted by cerumen,   oropharynx mucosa pink and moist without tonsillar exudates  Neck: trachea midline, no LAD, no thyromegaly, no neck stiffness  Lungs: clear to auscultation, no wheezes, rales or rhonchi, symmetric air entry  Heart: normal rate, regular rhythm, normal S1, S2, no murmurs, rubs, clicks or gallops  Abdomen: soft, nontender, nondistended, no rigidity, rebound, or guarding.   Skin: No rashes or abnormal skin lesions, no apparent jaundice or bruising  Extremities: Full ROM of all extremities, peripheral pulses normal, no unilateral leg swelling or calf tenderness   Neurological:alert, oriented, normal speech, no new focal findings or movement disorder noted from baseline      BP Readings from Last 3 Encounters:   08/20/24 128/80   02/28/24 128/80   10/21/23 130/89     Wt Readings from Last 3 Encounters:   08/20/24 51.9 kg (114 lb 6.7 oz)   02/28/24 50 kg (110 lb 3.2 oz)   12/18/23 52.2 kg (115 lb 1.3 oz)     /80 (BP Location: Left arm, Patient Position: Sitting, BP Method: Medium (Manual))   Pulse 94   Temp 98.4 °F (36.9 °C) (Oral)   Ht 5' 2" (1.575 m)   Wt 51.9 kg (114 lb 6.7 oz)   LMP 08/14/2013   SpO2 100%   BMI 20.93 kg/m²    274}  Laboratory: I have reviewed old labs below:    274}    Lab Results   Component Value Date    WBC 4.61 09/13/2022    HGB 11.9 (L) 09/13/2022    HCT 35.0 (L) 09/13/2022    MCV 91 09/13/2022     09/13/2022     (L) 09/13/2022    K 3.8 09/13/2022     09/13/2022    CALCIUM 9.0 09/13/2022    " CO2 25 09/13/2022    GLU 80 09/13/2022    BUN 7 09/13/2022    CREATININE 0.7 09/13/2022    EGFRNORACEVR >60.0 09/13/2022    ANIONGAP 8 09/13/2022    PROT 7.1 09/13/2022    ALBUMIN 4.1 09/13/2022    BILITOT 0.6 09/13/2022    ALKPHOS 59 09/13/2022    ALT 14 09/13/2022    AST 21 09/13/2022    CHOL 189 12/09/2021    TRIG 36 12/09/2021    HDL 90 (H) 12/09/2021    LDLCALC 91.8 12/09/2021    TSH 2.930 05/20/2022      Lab reviewed by me: Particular labs of significance that I will monitor, workup, or treat to improve are mentioned below in diagnostic impression remarks.    Imaging/EKG: I have reviewed the pertinent results and my findings are noted in remarks.  274}    CC:   Chief Complaint   Patient presents with    Establish Care        274}    Assessment/Plan  Jada Fuchs is a 54 y.o. female who presents to clinic with:  1. Encounter to establish care    2. Essential hypertension, benign    3. Dyslipidemia       274}  Diagnostic Impression Remarks       54 y.o. female who presents to clinic today for establishing care    This is the extent of this pleasant patient's concerns at this present time.  I also discussed the importance of close follow up to discuss labs, change or modify her medications if needed, monitor side effects, and further evaluation of medical problems.     Additional workup planned: see labs ordered below.    See below for labs and meds ordered with associated diagnosis      1. Encounter to establish care  - CBC Auto Differential; Future  - Comprehensive Metabolic Panel; Future  - Lipid Panel; Future  - TSH; Future    2. Essential hypertension, benign  - CBC Auto Differential; Future  - Comprehensive Metabolic Panel; Future  - TSH; Future  Stable. Within goal 120/80  Continue current regimen   Monitor    3. Dyslipidemia  - CBC Auto Differential; Future  - Comprehensive Metabolic Panel; Future  - Lipid Panel; Future  ASCVD <5%        RTC annually or prn     Ellen Barraza MD, [facility]   Family  Medicine Physician  08/20/2024      If you are due for any health screening(s) below please notify me so we can arrange them to be ordered and scheduled to maintain your health.     You are up to date for your primary preventive health care, and there are no reminders at this time.                    The following information is provided to all patients.  This information is to help you find resources for any of the problems found today that may be affecting your health:                Living healthy guide: www.[facility]       Understanding Diabetes: www.diabetes.org       Eating healthy: www.cdc.gov/healthyweight      CDC home safety checklist: www.cdc.gov/steadi/patient.html      Agency on Aging: www.goea.[place].[facility]       Alcoholics anonymous (AA): www.aa.org      Physical Activity: www.david.nih.gov/kw7ncdu       Tobacco use: www.quitwithusla.org

## 2024-08-20 NOTE — PATIENT INSTRUCTIONS
Antonio Elias,     If you are due for any health screening(s) below please notify me so we can arrange them to be ordered and scheduled. Most healthy patients at your age complete them, but you are free to accept or refuse.     If you can't do it, I'll definitely understand. If you can, I'd certainly appreciate it!    All of your core healthy metrics are met.

## 2024-08-21 ENCOUNTER — CLINICAL SUPPORT (OUTPATIENT)
Dept: AUDIOLOGY | Facility: CLINIC | Age: 54
End: 2024-08-21
Payer: COMMERCIAL

## 2024-08-21 ENCOUNTER — OFFICE VISIT (OUTPATIENT)
Dept: OTOLARYNGOLOGY | Facility: CLINIC | Age: 54
End: 2024-08-21
Payer: COMMERCIAL

## 2024-08-21 VITALS
WEIGHT: 113.75 LBS | SYSTOLIC BLOOD PRESSURE: 163 MMHG | DIASTOLIC BLOOD PRESSURE: 92 MMHG | BODY MASS INDEX: 20.93 KG/M2 | HEART RATE: 101 BPM | HEIGHT: 62 IN

## 2024-08-21 DIAGNOSIS — Z01.10 HEARING WITHIN NORMAL LIMITS IN BOTH EARS: Primary | ICD-10-CM

## 2024-08-21 DIAGNOSIS — H61.21 IMPACTED CERUMEN OF RIGHT EAR: ICD-10-CM

## 2024-08-21 DIAGNOSIS — H61.21 HEARING LOSS DUE TO CERUMEN IMPACTION, RIGHT: ICD-10-CM

## 2024-08-21 DIAGNOSIS — H93.8X2 EAR FULLNESS, LEFT: Primary | ICD-10-CM

## 2024-08-21 PROCEDURE — 3080F DIAST BP >= 90 MM HG: CPT | Mod: CPTII,S$GLB,, | Performed by: OTOLARYNGOLOGY

## 2024-08-21 PROCEDURE — 3008F BODY MASS INDEX DOCD: CPT | Mod: CPTII,S$GLB,, | Performed by: OTOLARYNGOLOGY

## 2024-08-21 PROCEDURE — 1159F MED LIST DOCD IN RCRD: CPT | Mod: CPTII,S$GLB,, | Performed by: OTOLARYNGOLOGY

## 2024-08-21 PROCEDURE — 69210 REMOVE IMPACTED EAR WAX UNI: CPT | Mod: S$GLB,,, | Performed by: OTOLARYNGOLOGY

## 2024-08-21 PROCEDURE — 92557 COMPREHENSIVE HEARING TEST: CPT | Mod: S$GLB,,, | Performed by: AUDIOLOGIST

## 2024-08-21 PROCEDURE — 99203 OFFICE O/P NEW LOW 30 MIN: CPT | Mod: 25,S$GLB,, | Performed by: OTOLARYNGOLOGY

## 2024-08-21 PROCEDURE — 3077F SYST BP >= 140 MM HG: CPT | Mod: CPTII,S$GLB,, | Performed by: OTOLARYNGOLOGY

## 2024-08-21 PROCEDURE — 99999 PR PBB SHADOW E&M-EST. PATIENT-LVL III: CPT | Mod: PBBFAC,,, | Performed by: OTOLARYNGOLOGY

## 2024-08-21 PROCEDURE — 99999 PR PBB SHADOW E&M-EST. PATIENT-LVL I: CPT | Mod: PBBFAC,,, | Performed by: AUDIOLOGIST

## 2024-08-21 PROCEDURE — 92567 TYMPANOMETRY: CPT | Mod: S$GLB,,, | Performed by: AUDIOLOGIST

## 2024-08-21 NOTE — PROCEDURES
EAR DEBRIDEMENT / CERUMEN DISIMPACTION, 93450     Indication: Excessive cerumen with inability to examine the ears    Side: Bilateral    Findings: See note for details.     Procedure: Ear canal(s) cleared completely using alligators with microscopy.  Wax was not hydrolyzed with peroxide.  Topical medication was not applied.    Complication: none

## 2024-08-21 NOTE — PROGRESS NOTES
Jada Fuchs was seen on 08/21/2024 for an audiological evaluation. Pt was accompanied by a family member during today's visit. Pertinent complains today include tinniuts. Pt denies history of loud noise exposure and denies early onset of genetic family history of hearing loss. Otoscopy revealed no cerumen in both ears. The tympanic membrane was visualized AU prior to proceeding with the hearing testing.      Results reveal normal hearing from 250-8000Hz bilaterally.    Speech Reception Thresholds were  15 dBHL for the right ear and 20 dBHL for the left ear.    Word recognition scores were excellent bilaterally.   Tympanograms were Type A for the right ear and Type A for the left ear.    Audiogram results were reviewed in detail with patient and all questions were answered. Results will be reviewed by the referring provider at the completion of this note. Recommend repeat hearing testing if problems arise and bilateral hearing protection with either muffs or in-ear protection in loud noises. All complaints were addressed during this visit to the patient's satisfaction. Plan of care was discussed in detail with the patient, who agreed with the plan as above.

## 2024-08-21 NOTE — PROGRESS NOTES
[facility]    Subjective:      Patient: Jada Fuchs Patient PCP: Ellen Barraza MD         :  1970     Sex:  female      MRN:  [id number]          Date of Visit: 2024      Chief Complaint: Ear Fullness (Left ear fullness. Pt states last week she could not hear out of her left ear and was very muffled. Pt states she felt like it could maybe be fluid. Pt states since then she heard a pop and her ears seem to be doing better but she still hears a pooping noise when swallowing. Pt states she took a Q-Tip to clean her ears out a little and seen brownish to yellowish wax.) and Cerumen Impaction (Audiogram scheduled today. Audiologist states that right ear needs to be cleaned of wax first. )      Patient ID: Jada Fuchs is a 54 y.o. female     Patient is a  lifelong NON-smoker with a past medical history of HTN, HLD and atopic dermatitis seen today for left-sided fullness and hearing loss concern that she has fluid.  Feels a pop and some improvement in hearing with swallowing.  Did use Q-tips and had some excessive wax.  Feels like this may have started after treatment with interventional pain with a neck injection.  Also treated with tizanidine, dexamethasone, and Lyrica around the same time.  Symptoms of popping and fullness seem to resolve just in the last few days.  No actual pain.  No drainage.      Labs:  WBC   Date Value Ref Range Status   2022 4.61 3.90 - 12.70 K/uL Final     Hemoglobin   Date Value Ref Range Status   2022 11.9 (L) 12.0 - 16.0 g/dL Final     Platelets   Date Value Ref Range Status   2022 158 150 - 450 K/uL Final     Creatinine   Date Value Ref Range Status   2022 0.7 0.5 - 1.4 mg/dL Final     TSH   Date Value Ref Range Status   2022 2.930 0.340 - 5.600 uIU/mL Final       Past Medical History  She has a past medical history of Abdominal pain, Anemia, Blood transfusion, History of uterine fibroid, Hypothyroidism, and Wears  "glasses.    Family / Surgical / Social History  Her family history includes Aneurysm in her father; Cancer in her maternal grandfather; Eczema in her sister; Hypertension in her mother; Seizures in her maternal grandmother.    Past Surgical History:   Procedure Laterality Date    BREAST BIOPSY Left     EXC    BREAST BIOPSY Left     NEEDLE    BREAST SURGERY      mass removed    HYSTERECTOMY  2015       Social History     Tobacco Use    Smoking status: Never    Smokeless tobacco: Never   Substance and Sexual Activity    Alcohol use: Yes     Comment: OCC    Drug use: No    Sexual activity: Not on file       Medications  She has a current medication list which includes the following prescription(s): amlodipine and multivit-min/iron/folic/aax439.      Allergies  Review of patient's allergies indicates:   Allergen Reactions    Adderall [dextroamphetamine-amphetamine]      Severe sx hypertension    Lisinopril      UNDESIRABLE SIDE EFFECT       All medications, allergies, and past history have been reviewed.    Objective:      Vitals:      2/28/2024    10:08 AM 8/20/2024     9:33 AM 8/21/2024     2:12 PM   Vitals - 1 value per visit   SYSTOLIC 128 128 163   DIASTOLIC 80 80 92   Pulse 73 94 101   Temp  98.4 °F (36.9 °C)    SPO2 100 % 100 %    Weight (lb) 110.2 114.42 113.76   Weight (kg) 49.986 51.9 51.6   Height 5' 2" (1.575 m) 5' 2" (1.575 m) 5' 2" (1.575 m)   BMI (Calculated) 20.2 20.9 20.8   Pain Score Zero Seven Seven       Body surface area is 1.5 meters squared.    Physical Exam:    GENERAL  APPEARANCE -  alert, appears stated age, and cooperative  BARRIER(S) TO COMMUNICATION -  none VOICE - appropriate for age and gender    INTEGUMENTARY  no suspicious head and neck lesions    HEENT  HEAD: Normocephalic, without obvious abnormality, atraumatic  FACE: INSPECTION - Symmetric, no signs of trauma, no suspicious lesion(s)      STRENGTH - facial symmetry intact     PALPATION -  No masses     SALIVARY GLANDS - non-tender " with no appreciable mass    NECK/THYROID: normal atraumatic, no neck masses, normal thyroid, no jvd    EYES  Normal occular alignment and mobility with no visible nystagmus at rest    EARS/NOSE/MOUTH/THROAT  EARS  PINNAE AND EXTERNAL EARS - no suspicious lesion OTOSCOPIC EXAM (surgical microscopy was used for visualization/instrumentation): EAR EXAM - excessive wax cleared bilaterally completely obscuring view of the right ear.  Both sides cleared with microscopy and alligators without trauma to reveal completely normal right tympanic membrane left side with perhaps some hyperinflation in his small linear scar of the anterior-inferior segment.  No cholesteatoma or inflammation.  No perforation or effusion.  HEARING - grossly intact to voice/finger rub    NOSE AND SINUSES  EXTERNAL NOSE - Grossly normal for age/sex  SEPTUM - normal/no obstruction on anterior exam without decongestion TURBINATES - within normal limits MUCOSA - within normal limits     MOUTH AND THROAT   ORAL CAVITY, LIPS, TEETH, GUMS & TONGUE - moist, no suspicious lesions  OROPHARYNX /TONSILS/PHARYNGEAL WALLS/HYPOPHARYNX - no erythema or exudates  NASOPHARYNX - limited mirror exam - unable to visualize due to anatomy/gag  LARYNX -  - limited mirror exam - unable to visualize due to anatomy/gag      CHEST AND LUNG   INSPECTION & AUSCULTATION - normal effort, no stridor    CARDIOVASCULAR  AUSCULTATION & PERIPHERAL VASCULAR - regular rate and rhythm.    NEUROLOGIC  MENTAL STATUS - alert, interactive CRANIAL NERVES - normal    LYMPHATIC  HEAD AND NECK - non-palpable; SUPRACLAVICULAR - deferred      Procedure(s):  Cerumen removal performed.  See procedure note.          Assessment:      Problem List Items Addressed This Visit    None  Visit Diagnoses       Ear fullness, left    -  Primary    Impacted cerumen of right ear                     Plan:      Wax cleared.  Audiogram pending.  Symptoms have resolved.  Short of some abnormality on audiogram further  evaluation is not recommended.  Symptoms may or may not been related to the treatment or the procedure itself but typically steroids and injections of steroids tend to help with Eustachian tube dysfunction not the other way around.  It may have represented some form of referred otalgia associated with the strain and discomfort of the procedure and/or the underlying disease process.    Follow up as needed.  Routine ear care for wax prevention as outlined.          Voice recognition software was used in the creation of this note/communication and any sound-alike errors which may have occurred from its use should be taken in context when interpreting.  If such errors prevent a clear understanding of the note/communication, please contact the office for clarification.

## 2024-09-10 ENCOUNTER — TELEPHONE (OUTPATIENT)
Dept: FAMILY MEDICINE | Facility: CLINIC | Age: 54
End: 2024-09-10
Payer: COMMERCIAL

## 2024-09-10 DIAGNOSIS — E78.5 DYSLIPIDEMIA: Primary | ICD-10-CM

## 2024-09-10 PROBLEM — E78.00 PURE HYPERCHOLESTEROLEMIA: Status: RESOLVED | Noted: 2021-05-19 | Resolved: 2024-09-10

## 2024-09-19 ENCOUNTER — E-VISIT (OUTPATIENT)
Dept: FAMILY MEDICINE | Facility: CLINIC | Age: 54
End: 2024-09-19
Payer: COMMERCIAL

## 2024-09-19 ENCOUNTER — TELEPHONE (OUTPATIENT)
Dept: FAMILY MEDICINE | Facility: CLINIC | Age: 54
End: 2024-09-19

## 2024-09-19 DIAGNOSIS — D69.2: Primary | ICD-10-CM

## 2024-09-19 NOTE — TELEPHONE ENCOUNTER
----- Message from Milena Ramey sent at 9/19/2024  1:24 PM CDT -----  Contact: self  Type: Needs Medical Advice  Who Called:  pt  Best Call Back Number: [phone]   Additional Information: please call pt had a 1.15pm appt today virtual and is having trouble getting on

## 2024-09-19 NOTE — PROGRESS NOTES
"  Patient ID: Jada Fuchs is a 54 y.o. female.    Chief Complaint: General Illness (Entered automatically based on patient selection in [employer].)    The patient initiated a request through [employer] on 9/19/2024 for evaluation and management with a chief complaint of General Illness (Entered automatically based on patient selection in [employer].)     I evaluated the questionnaire submission on 9/19/2024.    [facility] Evisit General    9/19/2024  2:30 PM CDT - Filed by Patient   Do you agree to participate in an E-Visit? Yes   If you have any of the following symptoms, please present to your local emergency room or call 911:  I acknowledge   Choose the state of your primary residence [place]   Are you pregnant, could you be pregnant, or are you breast feeding? None of the above   What is the main issue you would like addressed today? Bruise of skin   Please describe your symptoms Pain of bruise   Where is your problem located? Left arm   How severe are your symptoms? Moderate   Have you had these symptoms before? No   How long have you been having these symptoms? For a week   Please list any medications or treatments you have used for your condition and indicate if it was effective or not. Na   What makes this feel better? None   What makes this feel worse? None   Are these symptoms related to a condition that you currently have? I am not sure   What is the condition?    When were you last seen for this condition?    Please describe any probable cause for these symptoms Unsure   Provide any additional information you feel is important.    Please attach any relevant images or files    Are you able to take your vital signs? No         Encounter Diagnosis   Name Primary?    Purpura simplex Yes      Patient missed virtual visit today.   "I have a bruising rina that suddenly appeared on my left arm since 9/8/24. There is a small raised knot on the skin that hurts when touched "  No known trauma. No " antiplatelets/anticoagulants.   Per chart review abnormal bruising noted in 2013     No orders of the defined types were placed in this encounter.           No follow-ups on file.      E-Visit Time Tracking:

## 2024-09-20 ENCOUNTER — LAB VISIT (OUTPATIENT)
Dept: LAB | Facility: HOSPITAL | Age: 54
End: 2024-09-20
Attending: STUDENT IN AN ORGANIZED HEALTH CARE EDUCATION/TRAINING PROGRAM
Payer: COMMERCIAL

## 2024-09-20 DIAGNOSIS — E78.5 DYSLIPIDEMIA: ICD-10-CM

## 2024-09-20 DIAGNOSIS — D69.2: ICD-10-CM

## 2024-09-20 DIAGNOSIS — I10 ESSENTIAL HYPERTENSION, BENIGN: ICD-10-CM

## 2024-09-20 DIAGNOSIS — Z76.89 ENCOUNTER TO ESTABLISH CARE: ICD-10-CM

## 2024-09-20 LAB
ALBUMIN SERPL BCP-MCNC: 4.1 G/DL (ref 3.5–5.2)
ALP SERPL-CCNC: 62 U/L (ref 55–135)
ALT SERPL W/O P-5'-P-CCNC: 20 U/L (ref 10–44)
ANION GAP SERPL CALC-SCNC: 10 MMOL/L (ref 8–16)
AST SERPL-CCNC: 20 U/L (ref 10–40)
BASOPHILS # BLD AUTO: 0.02 K/UL (ref 0–0.2)
BASOPHILS NFR BLD: 0.7 % (ref 0–1.9)
BILIRUB SERPL-MCNC: 0.6 MG/DL (ref 0.1–1)
BUN SERPL-MCNC: 11 MG/DL (ref 6–20)
CALCIUM SERPL-MCNC: 9.6 MG/DL (ref 8.7–10.5)
CHLORIDE SERPL-SCNC: 107 MMOL/L (ref 95–110)
CHOLEST SERPL-MCNC: 200 MG/DL (ref 120–199)
CHOLEST/HDLC SERPL: 2.4 {RATIO} (ref 2–5)
CO2 SERPL-SCNC: 25 MMOL/L (ref 23–29)
CREAT SERPL-MCNC: 0.8 MG/DL (ref 0.5–1.4)
DIFFERENTIAL METHOD BLD: ABNORMAL
EOSINOPHIL # BLD AUTO: 0 K/UL (ref 0–0.5)
EOSINOPHIL NFR BLD: 1 % (ref 0–8)
ERYTHROCYTE [DISTWIDTH] IN BLOOD BY AUTOMATED COUNT: 12.7 % (ref 11.5–14.5)
EST. GFR  (NO RACE VARIABLE): >60 ML/MIN/1.73 M^2
GLUCOSE SERPL-MCNC: 83 MG/DL (ref 70–110)
HCT VFR BLD AUTO: 36.2 % (ref 37–48.5)
HDLC SERPL-MCNC: 85 MG/DL (ref 40–75)
HDLC SERPL: 42.5 % (ref 20–50)
HGB BLD-MCNC: 11.9 G/DL (ref 12–16)
IMM GRANULOCYTES # BLD AUTO: 0 K/UL (ref 0–0.04)
IMM GRANULOCYTES NFR BLD AUTO: 0 % (ref 0–0.5)
LDLC SERPL CALC-MCNC: 107.2 MG/DL (ref 63–159)
LYMPHOCYTES # BLD AUTO: 1.2 K/UL (ref 1–4.8)
LYMPHOCYTES NFR BLD: 41.2 % (ref 18–48)
MCH RBC QN AUTO: 29.5 PG (ref 27–31)
MCHC RBC AUTO-ENTMCNC: 32.9 G/DL (ref 32–36)
MCV RBC AUTO: 90 FL (ref 82–98)
MONOCYTES # BLD AUTO: 0.3 K/UL (ref 0.3–1)
MONOCYTES NFR BLD: 9.1 % (ref 4–15)
NEUTROPHILS # BLD AUTO: 1.4 K/UL (ref 1.8–7.7)
NEUTROPHILS NFR BLD: 48 % (ref 38–73)
NONHDLC SERPL-MCNC: 115 MG/DL
NRBC BLD-RTO: 0 /100 WBC
PLATELET # BLD AUTO: 183 K/UL (ref 150–450)
PMV BLD AUTO: 12.1 FL (ref 9.2–12.9)
POTASSIUM SERPL-SCNC: 3.9 MMOL/L (ref 3.5–5.1)
PROT SERPL-MCNC: 7 G/DL (ref 6–8.4)
RBC # BLD AUTO: 4.04 M/UL (ref 4–5.4)
SODIUM SERPL-SCNC: 142 MMOL/L (ref 136–145)
TRIGL SERPL-MCNC: 39 MG/DL (ref 30–150)
TSH SERPL DL<=0.005 MIU/L-ACNC: 2.19 UIU/ML (ref 0.4–4)
WBC # BLD AUTO: 2.96 K/UL (ref 3.9–12.7)

## 2024-09-20 PROCEDURE — 36415 COLL VENOUS BLD VENIPUNCTURE: CPT | Mod: PO | Performed by: STUDENT IN AN ORGANIZED HEALTH CARE EDUCATION/TRAINING PROGRAM

## 2024-09-20 PROCEDURE — 85025 COMPLETE CBC W/AUTO DIFF WBC: CPT | Performed by: STUDENT IN AN ORGANIZED HEALTH CARE EDUCATION/TRAINING PROGRAM

## 2024-09-20 PROCEDURE — 85730 THROMBOPLASTIN TIME PARTIAL: CPT | Performed by: STUDENT IN AN ORGANIZED HEALTH CARE EDUCATION/TRAINING PROGRAM

## 2024-09-20 PROCEDURE — 85610 PROTHROMBIN TIME: CPT | Performed by: STUDENT IN AN ORGANIZED HEALTH CARE EDUCATION/TRAINING PROGRAM

## 2024-09-20 PROCEDURE — 84443 ASSAY THYROID STIM HORMONE: CPT | Performed by: STUDENT IN AN ORGANIZED HEALTH CARE EDUCATION/TRAINING PROGRAM

## 2024-09-20 PROCEDURE — 80053 COMPREHEN METABOLIC PANEL: CPT | Performed by: STUDENT IN AN ORGANIZED HEALTH CARE EDUCATION/TRAINING PROGRAM

## 2024-09-20 PROCEDURE — 80061 LIPID PANEL: CPT | Performed by: STUDENT IN AN ORGANIZED HEALTH CARE EDUCATION/TRAINING PROGRAM

## 2024-09-23 LAB
APTT PPP: 37 SEC (ref 21–32)
INR PPP: 1.2 (ref 0.8–1.2)
PROTHROMBIN TIME: 12.6 SEC (ref 9–12.5)

## 2024-09-24 DIAGNOSIS — D69.2: Primary | ICD-10-CM

## 2024-09-24 DIAGNOSIS — R79.1 PROLONGED PTT: ICD-10-CM

## 2024-10-08 ENCOUNTER — LAB VISIT (OUTPATIENT)
Dept: LAB | Facility: HOSPITAL | Age: 54
End: 2024-10-08
Attending: STUDENT IN AN ORGANIZED HEALTH CARE EDUCATION/TRAINING PROGRAM
Payer: COMMERCIAL

## 2024-10-08 DIAGNOSIS — D69.2: ICD-10-CM

## 2024-10-08 DIAGNOSIS — R79.1 PROLONGED PTT: ICD-10-CM

## 2024-10-08 LAB
BASOPHILS # BLD AUTO: 0.02 K/UL (ref 0–0.2)
BASOPHILS NFR BLD: 0.9 % (ref 0–1.9)
DIFFERENTIAL METHOD BLD: ABNORMAL
EOSINOPHIL # BLD AUTO: 0 K/UL (ref 0–0.5)
EOSINOPHIL NFR BLD: 1.3 % (ref 0–8)
ERYTHROCYTE [DISTWIDTH] IN BLOOD BY AUTOMATED COUNT: 13.1 % (ref 11.5–14.5)
HCT VFR BLD AUTO: 36.3 % (ref 37–48.5)
HGB BLD-MCNC: 11.7 G/DL (ref 12–16)
IMM GRANULOCYTES # BLD AUTO: 0 K/UL (ref 0–0.04)
IMM GRANULOCYTES NFR BLD AUTO: 0 % (ref 0–0.5)
LYMPHOCYTES # BLD AUTO: 1.1 K/UL (ref 1–4.8)
LYMPHOCYTES NFR BLD: 49.1 % (ref 18–48)
MCH RBC QN AUTO: 28.9 PG (ref 27–31)
MCHC RBC AUTO-ENTMCNC: 32.2 G/DL (ref 32–36)
MCV RBC AUTO: 90 FL (ref 82–98)
MONOCYTES # BLD AUTO: 0.2 K/UL (ref 0.3–1)
MONOCYTES NFR BLD: 10.2 % (ref 4–15)
NEUTROPHILS # BLD AUTO: 0.9 K/UL (ref 1.8–7.7)
NEUTROPHILS NFR BLD: 38.5 % (ref 38–73)
NRBC BLD-RTO: 0 /100 WBC
PLATELET # BLD AUTO: 184 K/UL (ref 150–450)
PLATELET BLD QL SMEAR: ABNORMAL
PMV BLD AUTO: 11.9 FL (ref 9.2–12.9)
RBC # BLD AUTO: 4.05 M/UL (ref 4–5.4)
WBC # BLD AUTO: 2.26 K/UL (ref 3.9–12.7)

## 2024-10-08 PROCEDURE — 85025 COMPLETE CBC W/AUTO DIFF WBC: CPT | Performed by: STUDENT IN AN ORGANIZED HEALTH CARE EDUCATION/TRAINING PROGRAM

## 2024-10-08 PROCEDURE — 85240 CLOT FACTOR VIII AHG 1 STAGE: CPT | Performed by: STUDENT IN AN ORGANIZED HEALTH CARE EDUCATION/TRAINING PROGRAM

## 2024-10-08 PROCEDURE — 36415 COLL VENOUS BLD VENIPUNCTURE: CPT | Mod: PO | Performed by: STUDENT IN AN ORGANIZED HEALTH CARE EDUCATION/TRAINING PROGRAM

## 2024-10-08 PROCEDURE — 85730 THROMBOPLASTIN TIME PARTIAL: CPT | Performed by: STUDENT IN AN ORGANIZED HEALTH CARE EDUCATION/TRAINING PROGRAM

## 2024-10-09 ENCOUNTER — PATIENT MESSAGE (OUTPATIENT)
Dept: FAMILY MEDICINE | Facility: CLINIC | Age: 54
End: 2024-10-09
Payer: COMMERCIAL

## 2024-10-09 DIAGNOSIS — D66 FACTOR VIII (FUNCTIONAL) DEFICIENCY: Primary | ICD-10-CM

## 2024-10-09 LAB
APTT PPP: 41.7 SEC (ref 21–32)
FACT VIII ACT/NOR PPP: 25 % (ref 60–170)

## 2024-10-10 ENCOUNTER — TELEPHONE (OUTPATIENT)
Dept: FAMILY MEDICINE | Facility: CLINIC | Age: 54
End: 2024-10-10
Payer: COMMERCIAL

## 2024-10-10 DIAGNOSIS — D66 FACTOR VIII (FUNCTIONAL) DEFICIENCY: Primary | ICD-10-CM

## 2024-10-10 NOTE — TELEPHONE ENCOUNTER
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 My understanding is that our Hematologists do not treat Factor VIII deficiency at our clinic and so would refer patient to Hemophilia clinic at [facility] (also called [facility]) if you are agreeable with that approach.

## 2024-10-17 ENCOUNTER — TELEPHONE (OUTPATIENT)
Dept: FAMILY MEDICINE | Facility: CLINIC | Age: 54
End: 2024-10-17

## 2024-10-17 DIAGNOSIS — D66 FACTOR VIII (FUNCTIONAL) DEFICIENCY: Primary | ICD-10-CM

## 2024-10-17 NOTE — TELEPHONE ENCOUNTER
Spoke with Colton RN navigator  [facility]    He faxed all of the patient information to [facility].   Patient will be scheduled         ----- Message from Sabine sent at 10/17/2024  3:29 PM CDT -----  Contact: Colton at [facility]  Colton is req a call back in regards to the referral you sent over for this patient.  Please call back at [phone].  He just needs a call back to discuss this referral.

## 2024-11-10 ENCOUNTER — PATIENT MESSAGE (OUTPATIENT)
Dept: FAMILY MEDICINE | Facility: CLINIC | Age: 54
End: 2024-11-10
Payer: COMMERCIAL

## 2024-11-10 DIAGNOSIS — I10 ESSENTIAL HYPERTENSION, BENIGN: ICD-10-CM

## 2024-11-12 RX ORDER — AMLODIPINE BESYLATE 5 MG/1
5 TABLET ORAL DAILY
Qty: 90 TABLET | Refills: 3 | Status: SHIPPED | OUTPATIENT
Start: 2024-11-12 | End: 2025-11-12

## 2024-12-18 ENCOUNTER — PATIENT MESSAGE (OUTPATIENT)
Dept: FAMILY MEDICINE | Facility: CLINIC | Age: 54
End: 2024-12-18
Payer: COMMERCIAL

## 2024-12-18 ENCOUNTER — HOSPITAL ENCOUNTER (OUTPATIENT)
Dept: RADIOLOGY | Facility: HOSPITAL | Age: 54
Discharge: HOME | End: 2024-12-18
Attending: STUDENT IN AN ORGANIZED HEALTH CARE EDUCATION/TRAINING PROGRAM
Payer: COMMERCIAL

## 2024-12-18 ENCOUNTER — TELEPHONE (OUTPATIENT)
Dept: FAMILY MEDICINE | Facility: CLINIC | Age: 54
End: 2024-12-18
Payer: COMMERCIAL

## 2024-12-18 DIAGNOSIS — Z12.31 ENCOUNTER FOR SCREENING MAMMOGRAM FOR MALIGNANT NEOPLASM OF BREAST: Primary | ICD-10-CM

## 2024-12-18 DIAGNOSIS — Z12.31 ENCOUNTER FOR SCREENING MAMMOGRAM FOR BREAST CANCER: ICD-10-CM

## 2024-12-18 DIAGNOSIS — Z12.31 ENCOUNTER FOR SCREENING MAMMOGRAM FOR MALIGNANT NEOPLASM OF BREAST: ICD-10-CM

## 2024-12-18 PROCEDURE — 77067 SCR MAMMO BI INCL CAD: CPT | Mod: 26,,, | Performed by: RADIOLOGY

## 2024-12-18 PROCEDURE — 77067 SCR MAMMO BI INCL CAD: CPT | Mod: TC,PO

## 2024-12-18 PROCEDURE — 77063 BREAST TOMOSYNTHESIS BI: CPT | Mod: 26,,, | Performed by: RADIOLOGY

## 2024-12-18 NOTE — TELEPHONE ENCOUNTER
----- Message from Keerthi sent at 12/18/2024  8:28 AM CST -----  Regarding: Screening Mammo Order  Good morning,  Patient arrived at [facility] for her mammogram. Test was originally scheduled at [facility] and patient requested the appointment be changed to our location. Upon moving the appointment to our location, it looks like the patient had scheduled the appointment online and didn't have an electronic/physical order for a screening mammogram. Is there anyway Dr. Barraza is able to put in that order so the patient is able to get her mammogram today?      Thank you,  Keerthi Treadwell  Patient Access Representative    [facility]  [phone]

## 2025-01-16 ENCOUNTER — OFFICE VISIT (OUTPATIENT)
Dept: FAMILY MEDICINE | Facility: CLINIC | Age: 55
End: 2025-01-16
Payer: COMMERCIAL

## 2025-01-16 VITALS
TEMPERATURE: 98 F | DIASTOLIC BLOOD PRESSURE: 87 MMHG | SYSTOLIC BLOOD PRESSURE: 128 MMHG | RESPIRATION RATE: 17 BRPM | HEART RATE: 80 BPM

## 2025-01-16 DIAGNOSIS — E55.9 VITAMIN D DEFICIENCY: ICD-10-CM

## 2025-01-16 DIAGNOSIS — M54.2 NECK PAIN: ICD-10-CM

## 2025-01-16 DIAGNOSIS — E04.1 THYROID NODULE: Primary | ICD-10-CM

## 2025-01-16 DIAGNOSIS — Z90.710 S/P HYSTERECTOMY: ICD-10-CM

## 2025-01-16 DIAGNOSIS — D50.9 IRON DEFICIENCY ANEMIA, UNSPECIFIED IRON DEFICIENCY ANEMIA TYPE: ICD-10-CM

## 2025-01-16 DIAGNOSIS — K76.89 NODULE ON LIVER: ICD-10-CM

## 2025-01-16 DIAGNOSIS — Z00.00 PREVENTATIVE HEALTH CARE: ICD-10-CM

## 2025-01-16 PROBLEM — G89.29 CHRONIC LOW BACK PAIN: Status: ACTIVE | Noted: 2025-01-16

## 2025-01-16 PROBLEM — M54.50 CHRONIC LOW BACK PAIN: Status: ACTIVE | Noted: 2025-01-16

## 2025-01-16 PROCEDURE — 3079F DIAST BP 80-89 MM HG: CPT | Mod: CPTII,S$GLB,, | Performed by: FAMILY MEDICINE

## 2025-01-16 PROCEDURE — 1159F MED LIST DOCD IN RCRD: CPT | Mod: CPTII,S$GLB,, | Performed by: FAMILY MEDICINE

## 2025-01-16 PROCEDURE — 99214 OFFICE O/P EST MOD 30 MIN: CPT | Mod: S$GLB,,, | Performed by: FAMILY MEDICINE

## 2025-01-16 PROCEDURE — 3074F SYST BP LT 130 MM HG: CPT | Mod: CPTII,S$GLB,, | Performed by: FAMILY MEDICINE

## 2025-01-16 PROCEDURE — 99999 PR PBB SHADOW E&M-EST. PATIENT-LVL III: CPT | Mod: PBBFAC,,, | Performed by: FAMILY MEDICINE

## 2025-01-16 RX ORDER — BIMATOPROST 0.3 MG/ML
SOLUTION/ DROPS OPHTHALMIC
COMMUNITY
Start: 2024-12-11

## 2025-01-16 NOTE — PROGRESS NOTES
Subjective:       Patient ID: Jada Fuchs is a 54 y.o. female.    Chief Complaint: Establish Care      History of Present Illness    CHIEF COMPLAINT:  Ms. Fuchs presents for an initial visit to establish care with a new provider after transferring from previous practices.    HPI:  Ms. Fuchs is transferring care from Dr. Ace' previous practice and Estefany Barraza, who has left [facility]. She has a history of hysterectomy for uterine fibroids and ovarian cysts, previously followed by Tanisha Cavazos. Ms. Fuchs has residual anemia requiring follow-up. Her thyroid ultrasounds have been stable, without thyroid suppression medication. Previous scans showed cecal wall thickening, but a colonoscopy in 2017 was normal, with no related symptoms or history of polyps. Ms. Fuchs's only chronic medical condition is low-grade anemia. She consumes chicken and turkey approximately every other week but does not eat red meats. Ms. Fuchs denies any symptoms related to the previous cecal wall thickening or any chronic medical illness other than low-grade anemia.    MEDICATIONS:  Ms. Fuchs is on a hair, skin, and nails supplement containing biotin and collagen.    MEDICAL HISTORY:  Ms. Fuchs has a history of low-grade anemia, ovarian cysts, and cecal wall thickening.    SURGICAL HISTORY:  She underwent a hysterectomy for uterine fibroids. In 2017, the patient had a colonoscopy which was normal with no history of polyps.    IMAGING:  Ms. Fuchs's thyroid ultrasounds have been stable. Abdominal imaging revealed thickening of the cecal wall.    SOCIAL HISTORY:  Ms. Fuchs is a non-smoker.      ROS:  Constitutional: -weight loss          Allergies and Medications:   Review of patient's allergies indicates:   Allergen Reactions    Adderall [dextroamphetamine-amphetamine]      Severe sx hypertension    Lisinopril      UNDESIRABLE SIDE EFFECT     Current Outpatient Medications   Medication Sig Dispense Refill    amLODIPine  (NORVASC) 5 MG tablet Take 1 tablet (5 mg total) by mouth once daily. 90 tablet 3    bimatoprost (LUMIGAN) 0.03 % ophthalmic drops Place into both eyes.      multivit-min/iron/folic/ixq465 (HAIR, SKIN AND NAILS ADVANCED ORAL) Take by mouth.       No current facility-administered medications for this visit.       Family History:   Family History   Problem Relation Name Age of Onset    Cancer Maternal Grandfather          prostate    Hypertension Mother      Seizures Maternal Grandmother      Aneurysm Father      Eczema Sister      Melanoma Neg Hx         Social History:   Social History     Socioeconomic History    Marital status:     Number of children: 1   Occupational History    Occupation: case management     Comment: [employer]   Tobacco Use    Smoking status: Never    Smokeless tobacco: Never   Substance and Sexual Activity    Alcohol use: Yes     Comment: OCC    Drug use: No     Social Drivers of Health     Financial Resource Strain: Patient Declined (9/19/2024)    Overall Financial Resource Strain (CARDIA)     Difficulty of Paying Living Expenses: Patient declined   Food Insecurity: Patient Declined (9/19/2024)    Hunger Vital Sign     Worried About Running Out of Food in the Last Year: Patient declined     Ran Out of Food in the Last Year: Patient declined   Physical Activity: Insufficiently Active (9/19/2024)    Exercise Vital Sign     Days of Exercise per Week: 2 days     Minutes of Exercise per Session: 20 min   Stress: No Stress Concern Present (9/19/2024)    [employer] - Occupational Stress Questionnaire     Feeling of Stress : Only a little   Housing Stability: Unknown (9/19/2024)    Housing Stability Vital Sign     Unable to Pay for Housing in the Last Year: No           Objective:     Vitals:    01/16/25 0842   BP: 128/87   Pulse: 80   Resp: 17   Temp: 98 °F (36.7 °C)        Physical Exam    Vitals: Temperature: 98.0.  General: No acute distress.  Well-developed. Well-nourished.  Eyes: EOMI. Sclerae anicteric.  HENT: Normocephalic. Atraumatic. Nares patent. Moist oral mucosa.  Cardiovascular: Regular rate. Regular rhythm. No murmurs. No rubs. No gallops. Normal S1, S2.  Respiratory: Normal respiratory effort. Clear to auscultation bilaterally. No rales. No rhonchi. No wheezing.  Musculoskeletal: No  obvious deformity.  Extremities: No lower extremity edema. Capillary refill is adequate.  Neurological: Alert & oriented x3. No slurred speech. Normal gait.  Psychiatric: Normal mood. Normal affect. Good insight. Good judgment.  Skin: Warm. Dry. No rash.            Assessment:       1. Thyroid nodule    2. S/P hysterectomy    3. Neck pain    4. Iron deficiency anemia, unspecified iron deficiency anemia type    5. Vitamin D deficiency    6. Preventative health care    7. Nodule on liver        Plan:       Assessment & Plan    IMPRESSION:  - Reviewed patient's history of hysterectomy for uterine fibroids and ovarian cysts  - Noted stable thyroid ultrasounds without current thyroid suppression  - Considered previous cecal wall thickening, but noted normal colonoscopy in 2017 without symptoms or history of polyps  - Assessed low-grade anemia requiring follow-up  - Ordered thrombin time to evaluate for potential blood clotting disorder  - Planned to check iron levels (iron, ferritin, TIBC) to assess anemia status  - Considering iron supplementation pending lab results, given patient's limited intake of iron-rich foods    HEREDITARY FACTOR VIII DEFICIENCY:  - Ordered thrombin time test to check for blood clotting disorders, as it is a sensitive indicator for these conditions.  - If prolonged, will pursue further workup.  - Additionally, ordered blood count, iron level, ferritin, and TIBC tests.  - Recommend iron supplementation if levels are low.  - Scheduled follow-up visit in 3 months to review results and adjust treatment plan as necessary.    ESTABLISHMENT OF  "CARE:  - Ms. Fuchs here for establishment and transfer from previous practice.  - Vital signs stable with temperature 98.0°F.  - Physical exam: lungs clear, heart regular without abnormalities, extremities symmetrical without edema.  - Scheduled follow up in 3 months for comprehensive physical exam, pending availability.    ANEMIA:  - Ms. Fuchs has residual anemia requiring follow-up.  - Noted patient does not consume red meats.  - Discussed iron content in organ meats, particularly liver, as a dietary source.    HISTORY OF HYSTERECTOMY:  - Noted patient's history of hysterectomy for uterine fibroids.    HISTORY OF OVARIAN CYSTS:  - Noted patient's history of ovarian cysts.    LABS:  - Ordered complete blood count and iron studies.        Jada Baker" was seen today for establish care.    Diagnoses and all orders for this visit:    Thyroid nodule    S/P hysterectomy    Neck pain    Iron deficiency anemia, unspecified iron deficiency anemia type  -     THROMBIN TIME; Future  -     CBC Auto Differential; Future  -     Iron and TIBC; Future    Vitamin D deficiency  -     Vitamin D; Future    Preventative health care  -     Lipid Panel; Future  -     Comprehensive Metabolic Panel; Future    Nodule on liver  -     US Abdomen Limited_Liver; Future         Follow up in about 3 months (around 4/16/2025) for annual.  This note was generated with the assistance of ambient listening technology. Verbal consent was obtained by the patient and accompanying visitor(s) for the recording of patient appointment to facilitate this note. I attest to having reviewed and edited the generated note for accuracy, though some syntax or spelling errors may persist. Please contact the author of this note for any clarification.   Subjective:       Patient ID: Jada Fuchs is a 54 y.o. female.    Chief Complaint: Establish Care    Patient Active Problem List   Diagnosis    History of uterine fibroid    Thyroid nodule    Neutropenia "    Osteoarthritis of left thumb    Bilateral ovarian cysts    Essential hypertension, benign    Atopic dermatitis    S/P hysterectomy    Chronic low back pain    Neck pain     Lab Results   Component Value Date    WBC 2.26 (L) 10/08/2024    HGB 11.7 (L) 10/08/2024    HCT 36.3 (L) 10/08/2024     10/08/2024    CHOL 200 (H) 09/20/2024    TRIG 39 09/20/2024    HDL 85 (H) 09/20/2024    ALT 20 09/20/2024    AST 20 09/20/2024     09/20/2024    K 3.9 09/20/2024     09/20/2024    CREATININE 0.8 09/20/2024    BUN 11 09/20/2024    CO2 25 09/20/2024    TSH 2.186 09/20/2024    INR 1.2 09/20/2024     Lab Results   Component Value Date    CHOL 200 (H) 09/20/2024    CHOL 189 12/09/2021    CHOL 208 (H) 12/07/2020     Lab Results   Component Value Date    HDL 85 (H) 09/20/2024    HDL 90 (H) 12/09/2021    HDL 77 (H) 12/07/2020     Lab Results   Component Value Date    LDLCALC 107.2 09/20/2024    LDLCALC 91.8 12/09/2021    LDLCALC 125.2 12/07/2020     Lab Results   Component Value Date    TRIG 39 09/20/2024    TRIG 36 12/09/2021    TRIG 29 (L) 12/07/2020       Lab Results   Component Value Date    CHOLHDL 42.5 09/20/2024    CHOLHDL 47.6 12/09/2021    CHOLHDL 37.0 12/07/2020       Pain in hand pain secondary to accident in 2023.    History of Present Illness    CHIEF COMPLAINT:  Ms. Fuchs presents for an initial visit to establish care with a new provider after transferring from previous practices.    HPI:  Ms. Fuchs is transferring care from Dr. Ace' previous practice and Estefany Barraza, who has left [facility]. She has a history of hysterectomy for uterine fibroids and ovarian cysts, previously followed by Tanisha Cavazos. Ms. Fuchs has residual anemia requiring follow-up. Her thyroid ultrasounds have been stable, without thyroid suppression medication. Previous scans showed cecal wall thickening, but a colonoscopy in 2017 was normal, with no related symptoms or history of polyps. Ms. Fuchs's only chronic  medical condition is low-grade anemia. She consumes chicken and turkey approximately every other week but does not eat red meats. Ms. Fuchs denies any symptoms related to the previous cecal wall thickening or any chronic medical illness other than low-grade anemia.    MEDICATIONS:  Ms. Fuchs is on a hair, skin, and nails supplement containing biotin and collagen.    MEDICAL HISTORY:  Ms. Fuchs has a history of low-grade anemia, ovarian cysts, and cecal wall thickening.    SURGICAL HISTORY:  She underwent a hysterectomy for uterine fibroids. In 2017, the patient had a colonoscopy which was normal with no history of polyps.    IMAGING:  Ms. Fuchs's thyroid ultrasounds have been stable. Abdominal imaging revealed thickening of the cecal wall.    SOCIAL HISTORY:  Ms. Fuchs is a non-smoker.      ROS:  Constitutional: -weight loss          Allergies and Medications:   Review of patient's allergies indicates:   Allergen Reactions    Adderall [dextroamphetamine-amphetamine]      Severe sx hypertension    Lisinopril      UNDESIRABLE SIDE EFFECT     Current Outpatient Medications   Medication Sig Dispense Refill    amLODIPine (NORVASC) 5 MG tablet Take 1 tablet (5 mg total) by mouth once daily. 90 tablet 3    bimatoprost (LUMIGAN) 0.03 % ophthalmic drops Place into both eyes.      multivit-min/iron/folic/qhj414 (HAIR, SKIN AND NAILS ADVANCED ORAL) Take by mouth.       No current facility-administered medications for this visit.       Family History:   Family History   Problem Relation Name Age of Onset    Cancer Maternal Grandfather          prostate    Hypertension Mother      Seizures Maternal Grandmother      Aneurysm Father      Eczema Sister      Melanoma Neg Hx         Social History:   Social History     Socioeconomic History    Marital status:     Number of children: 1   Occupational History    Occupation: case management     Comment: [employer]   Tobacco Use    Smoking status: Never     Smokeless tobacco: Never   Substance and Sexual Activity    Alcohol use: Yes     Comment: OCC    Drug use: No     Social Drivers of Health     Financial Resource Strain: Patient Declined (9/19/2024)    Overall Financial Resource Strain (CARDIA)     Difficulty of Paying Living Expenses: Patient declined   Food Insecurity: Patient Declined (9/19/2024)    Hunger Vital Sign     Worried About Running Out of Food in the Last Year: Patient declined     Ran Out of Food in the Last Year: Patient declined   Physical Activity: Insufficiently Active (9/19/2024)    Exercise Vital Sign     Days of Exercise per Week: 2 days     Minutes of Exercise per Session: 20 min   Stress: No Stress Concern Present (9/19/2024)    [employer] [employer] - Occupational Stress Questionnaire     Feeling of Stress : Only a little   Housing Stability: Unknown (9/19/2024)    Housing Stability Vital Sign     Unable to Pay for Housing in the Last Year: No           Objective:     Vitals:    01/16/25 0842   BP: 128/87   Pulse: 80   Resp: 17   Temp: 98 °F (36.7 °C)        Physical Exam    Vitals: Temperature: 98.0.  General: No acute distress. Well-developed. Well-nourished.  Eyes: EOMI. Sclerae anicteric.  HENT: Normocephalic. Atraumatic. Nares patent. Moist oral mucosa.  Cardiovascular: Regular rate. Regular rhythm. No murmurs. No rubs. No gallops. Normal S1, S2.  Respiratory: Normal respiratory effort. Clear to auscultation bilaterally. No rales. No rhonchi. No wheezing.  Musculoskeletal: No  obvious deformity.  Extremities: No lower extremity edema. Capillary refill is adequate.  Neurological: Alert & oriented x3. No slurred speech. Normal gait.  Psychiatric: Normal mood. Normal affect. Good insight. Good judgment.  Skin: Warm. Dry. No rash.            Assessment:       1. Thyroid nodule    2. S/P hysterectomy    3. Neck pain    4. Iron deficiency anemia, unspecified iron deficiency anemia type    5. Vitamin D deficiency    6.  "Preventative health care    7. Nodule on liver        Plan:       Assessment & Plan    IMPRESSION:  - Reviewed patient's history of hysterectomy for uterine fibroids and ovarian cysts  - Noted stable thyroid ultrasounds without current thyroid suppression  - Considered previous cecal wall thickening, but noted normal colonoscopy in 2017 without symptoms or history of polyps  - Assessed low-grade anemia requiring follow-up  - Ordered thrombin time to evaluate for potential blood clotting disorder  - Planned to check iron levels (iron, ferritin, TIBC) to assess anemia status  - Considering iron supplementation pending lab results, given patient's limited intake of iron-rich foods    HEREDITARY FACTOR VIII DEFICIENCY:  - Ordered thrombin time test to check for blood clotting disorders, as it is a sensitive indicator for these conditions.  - If prolonged, will pursue further workup.  - Additionally, ordered blood count, iron level, ferritin, and TIBC tests.  - Recommend iron supplementation if levels are low.  - Scheduled follow-up visit in 3 months to review results and adjust treatment plan as necessary.    ESTABLISHMENT OF CARE:  - Ms. Fuchs here for establishment and transfer from previous practice.  - Vital signs stable with temperature 98.0°F.  - Physical exam: lungs clear, heart regular without abnormalities, extremities symmetrical without edema.  - Scheduled follow up in 3 months for comprehensive physical exam, pending availability.    ANEMIA:  - Ms. Fuchs has residual anemia requiring follow-up.  - Noted patient does not consume red meats.  - Discussed iron content in organ meats, particularly liver, as a dietary source.    HISTORY OF HYSTERECTOMY:  - Noted patient's history of hysterectomy for uterine fibroids.    HISTORY OF OVARIAN CYSTS:  - Noted patient's history of ovarian cysts.    LABS:  - Ordered complete blood count and iron studies.        Jada Baker" was seen today for establish " care.    Diagnoses and all orders for this visit:    Thyroid nodule    S/P hysterectomy    Neck pain    Iron deficiency anemia, unspecified iron deficiency anemia type  -     THROMBIN TIME; Future  -     CBC Auto Differential; Future  -     Iron and TIBC; Future    Vitamin D deficiency  -     Vitamin D; Future    Preventative health care  -     Lipid Panel; Future  -     Comprehensive Metabolic Panel; Future    Nodule on liver  -     US Abdomen Limited_Liver; Future         Follow up in about 3 months (around 4/16/2025) for annual.  This note was generated with the assistance of ambient listening technology. Verbal consent was obtained by the patient and accompanying visitor(s) for the recording of patient appointment to facilitate this note. I attest to having reviewed and edited the generated note for accuracy, though some syntax or spelling errors may persist. Please contact the author of this note for any clarification.

## 2025-01-24 ENCOUNTER — HOSPITAL ENCOUNTER (OUTPATIENT)
Dept: RADIOLOGY | Facility: HOSPITAL | Age: 55
Discharge: HOME | End: 2025-01-24
Attending: FAMILY MEDICINE
Payer: COMMERCIAL

## 2025-01-24 ENCOUNTER — TELEPHONE (OUTPATIENT)
Dept: FAMILY MEDICINE | Facility: CLINIC | Age: 55
End: 2025-01-24
Payer: COMMERCIAL

## 2025-01-24 DIAGNOSIS — K76.89 NODULE ON LIVER: ICD-10-CM

## 2025-01-24 PROCEDURE — 76705 ECHO EXAM OF ABDOMEN: CPT | Mod: 26,,, | Performed by: RADIOLOGY

## 2025-01-24 PROCEDURE — 76705 ECHO EXAM OF ABDOMEN: CPT | Mod: TC,PO

## 2025-01-24 NOTE — TELEPHONE ENCOUNTER
----- Message from Jerardo Merino MD sent at 1/24/2025  2:29 PM CST -----  Lab reviewed: all OK. Please notify pt. Continue pres meds.

## 2025-01-24 NOTE — PROGRESS NOTES
Liver ultrasound shows several benign looking cysts and normal echogenicity.  No evidence on ultrasound of fatty liver.  Benign cyst noted on the kidney as well.

## 2025-01-24 NOTE — TELEPHONE ENCOUNTER
----- Message from Jerardo Merino MD sent at 1/24/2025 10:46 AM CST -----  Liver ultrasound shows several benign looking cysts and normal echogenicity.  No evidence on ultrasound of fatty liver.  Benign cyst noted on the kidney as well.

## 2025-02-11 ENCOUNTER — HOSPITAL ENCOUNTER (EMERGENCY)
Facility: HOSPITAL | Age: 55
Discharge: HOME | End: 2025-02-11
Attending: STUDENT IN AN ORGANIZED HEALTH CARE EDUCATION/TRAINING PROGRAM
Payer: COMMERCIAL

## 2025-02-11 VITALS
RESPIRATION RATE: 16 BRPM | HEIGHT: 61 IN | BODY MASS INDEX: 20.77 KG/M2 | HEART RATE: 73 BPM | WEIGHT: 110 LBS | SYSTOLIC BLOOD PRESSURE: 141 MMHG | TEMPERATURE: 98 F | OXYGEN SATURATION: 99 % | DIASTOLIC BLOOD PRESSURE: 89 MMHG

## 2025-02-11 DIAGNOSIS — R07.9 CHEST PAIN: ICD-10-CM

## 2025-02-11 DIAGNOSIS — R07.89 CHEST WALL PAIN: Primary | ICD-10-CM

## 2025-02-11 LAB
ALBUMIN SERPL BCP-MCNC: 4.6 G/DL (ref 3.5–5.2)
ALP SERPL-CCNC: 68 U/L (ref 55–135)
ALT SERPL W/O P-5'-P-CCNC: 18 U/L (ref 10–44)
ANION GAP SERPL CALC-SCNC: 5 MMOL/L (ref 8–16)
AST SERPL-CCNC: 18 U/L (ref 10–40)
BASOPHILS # BLD AUTO: 0.01 K/UL (ref 0–0.2)
BASOPHILS NFR BLD: 0.3 % (ref 0–1.9)
BILIRUB SERPL-MCNC: 0.5 MG/DL (ref 0.1–1)
BNP SERPL-MCNC: 12 PG/ML (ref 0–99)
BUN SERPL-MCNC: 14 MG/DL (ref 6–20)
CALCIUM SERPL-MCNC: 9.7 MG/DL (ref 8.7–10.5)
CHLORIDE SERPL-SCNC: 109 MMOL/L (ref 95–110)
CO2 SERPL-SCNC: 26 MMOL/L (ref 23–29)
CREAT SERPL-MCNC: 0.7 MG/DL (ref 0.5–1.4)
DIFFERENTIAL METHOD BLD: ABNORMAL
EOSINOPHIL # BLD AUTO: 0 K/UL (ref 0–0.5)
EOSINOPHIL NFR BLD: 1 % (ref 0–8)
ERYTHROCYTE [DISTWIDTH] IN BLOOD BY AUTOMATED COUNT: 12.5 % (ref 11.5–14.5)
EST. GFR  (NO RACE VARIABLE): >60 ML/MIN/1.73 M^2
GLUCOSE SERPL-MCNC: 79 MG/DL (ref 70–110)
HCT VFR BLD AUTO: 35 % (ref 37–48.5)
HGB BLD-MCNC: 11.7 G/DL (ref 12–16)
IMM GRANULOCYTES # BLD AUTO: 0 K/UL (ref 0–0.04)
IMM GRANULOCYTES NFR BLD AUTO: 0 % (ref 0–0.5)
LYMPHOCYTES # BLD AUTO: 1.3 K/UL (ref 1–4.8)
LYMPHOCYTES NFR BLD: 46.2 % (ref 18–48)
MAGNESIUM SERPL-MCNC: 1.9 MG/DL (ref 1.6–2.6)
MCH RBC QN AUTO: 29.1 PG (ref 27–31)
MCHC RBC AUTO-ENTMCNC: 33.4 G/DL (ref 32–36)
MCV RBC AUTO: 87 FL (ref 82–98)
MONOCYTES # BLD AUTO: 0.3 K/UL (ref 0.3–1)
MONOCYTES NFR BLD: 9.7 % (ref 4–15)
NEUTROPHILS # BLD AUTO: 1.2 K/UL (ref 1.8–7.7)
NEUTROPHILS NFR BLD: 42.8 % (ref 38–73)
NRBC BLD-RTO: 0 /100 WBC
PLATELET # BLD AUTO: 165 K/UL (ref 150–450)
PMV BLD AUTO: 12.3 FL (ref 9.2–12.9)
POTASSIUM SERPL-SCNC: 3.8 MMOL/L (ref 3.5–5.1)
PROT SERPL-MCNC: 7.4 G/DL (ref 6–8.4)
RBC # BLD AUTO: 4.02 M/UL (ref 4–5.4)
SODIUM SERPL-SCNC: 140 MMOL/L (ref 136–145)
TROPONIN I SERPL HS-MCNC: <2.3 PG/ML (ref 0–14.9)
WBC # BLD AUTO: 2.9 K/UL (ref 3.9–12.7)

## 2025-02-11 PROCEDURE — 83735 ASSAY OF MAGNESIUM: CPT | Performed by: PHYSICIAN ASSISTANT

## 2025-02-11 PROCEDURE — 25000003 PHARM REV CODE 250: Performed by: STUDENT IN AN ORGANIZED HEALTH CARE EDUCATION/TRAINING PROGRAM

## 2025-02-11 PROCEDURE — 83880 ASSAY OF NATRIURETIC PEPTIDE: CPT | Performed by: PHYSICIAN ASSISTANT

## 2025-02-11 PROCEDURE — 85025 COMPLETE CBC W/AUTO DIFF WBC: CPT | Performed by: PHYSICIAN ASSISTANT

## 2025-02-11 PROCEDURE — 93005 ELECTROCARDIOGRAM TRACING: CPT | Performed by: INTERNAL MEDICINE

## 2025-02-11 PROCEDURE — 99285 EMERGENCY DEPT VISIT HI MDM: CPT | Mod: 25

## 2025-02-11 PROCEDURE — 84484 ASSAY OF TROPONIN QUANT: CPT | Performed by: PHYSICIAN ASSISTANT

## 2025-02-11 PROCEDURE — 80053 COMPREHEN METABOLIC PANEL: CPT | Performed by: PHYSICIAN ASSISTANT

## 2025-02-11 RX ORDER — ASPIRIN 325 MG
325 TABLET ORAL
Status: COMPLETED | OUTPATIENT
Start: 2025-02-11 | End: 2025-02-11

## 2025-02-11 RX ORDER — METHOCARBAMOL 750 MG/1
750 TABLET, FILM COATED ORAL 3 TIMES DAILY
Qty: 15 TABLET | Refills: 0 | Status: SHIPPED | OUTPATIENT
Start: 2025-02-11 | End: 2025-02-16

## 2025-02-11 RX ORDER — BACLOFEN 10 MG/1
10 TABLET ORAL EVERY 8 HOURS PRN
COMMUNITY
Start: 2025-01-29

## 2025-02-11 RX ORDER — SPIRONOLACTONE 50 MG/1
50 TABLET, FILM COATED ORAL DAILY
COMMUNITY

## 2025-02-11 RX ADMIN — ASPIRIN 325 MG ORAL TABLET 325 MG: 325 PILL ORAL at 09:02

## 2025-02-12 NOTE — ED PROVIDER NOTES
"Encounter Date: 2/11/2025       History     Chief Complaint   Patient presents with    Chest Pain     Sharp mid sternal pain started 2 wks ago "only felt when lying down"  Today started feeling it when was up moving around; constant.  Denies SOB, nausea     HPI    Jada Fuchs is a 54 y.o. female with a past medical history of anemia and hypothyroidism that presents emergency department for chest wall pain that has been ongoing for a proximally 2 weeks.  Pain was initially noted when she was lying on her left side and only at night.  When she laid on her right side, she did not have this pain.  Pain is nonexertional and not associated with shortness of breath.  She had consistent pain today, so she came to emergency department for further evaluation.  Denies shortness of breath, numbness, weakness, nausea, vomiting, diaphoresis, and leg swelling.    Review of patient's allergies indicates:   Allergen Reactions    Adderall [dextroamphetamine-amphetamine]      Severe sx hypertension    Lisinopril      UNDESIRABLE SIDE EFFECT     Past Medical History:   Diagnosis Date    Abdominal pain     Anemia     probably secondary to fibroid    Blood transfusion     History of uterine fibroid     Hypothyroidism     Wears glasses     CONTACS     Past Surgical History:   Procedure Laterality Date    BREAST BIOPSY Left     EXC    BREAST BIOPSY Left     NEEDLE    BREAST SURGERY      mass removed    HYSTERECTOMY  2015     Family History   Problem Relation Name Age of Onset    Cancer Maternal Grandfather          prostate    Hypertension Mother      Seizures Maternal Grandmother      Aneurysm Father      Eczema Sister      Melanoma Neg Hx       Social History     Tobacco Use    Smoking status: Never    Smokeless tobacco: Never   Substance Use Topics    Alcohol use: Yes     Comment: OCC    Drug use: No     Review of Systems   Constitutional:  Negative for fever.   HENT:  Negative for sore throat.    Respiratory:  Negative for cough " and shortness of breath.    Cardiovascular:  Positive for chest pain.   Gastrointestinal:  Negative for abdominal pain, constipation, diarrhea, nausea and vomiting.   Genitourinary:  Negative for dysuria, frequency and hematuria.   Musculoskeletal:  Negative for back pain.   Skin:  Negative for rash.   Neurological:  Negative for weakness.   Hematological:  Does not bruise/bleed easily.       Physical Exam     Initial Vitals [02/11/25 1833]   BP Pulse Resp Temp SpO2   (!) 149/93 90 18 98.6 °F (37 °C) 100 %      MAP       --         Physical Exam    Nursing note and vitals reviewed.  Constitutional: She appears well-developed and well-nourished.   HENT:   Head: Normocephalic and atraumatic.   Eyes: EOM are normal. Pupils are equal, round, and reactive to light.   Neck:   Normal range of motion.  Cardiovascular:  Normal rate, regular rhythm and normal heart sounds.           Pulmonary/Chest: Breath sounds normal. No respiratory distress. She has no wheezes. She has no rhonchi. She has no rales. She exhibits tenderness (Tenderness to palpation in the lower chest wall over the sternum).   Abdominal: Abdomen is soft. She exhibits no distension. There is no abdominal tenderness. There is no rebound.   Musculoskeletal:         General: Normal range of motion.      Cervical back: Normal range of motion.     Neurological: She is alert and oriented to person, place, and time. She has normal strength. No cranial nerve deficit or sensory deficit. GCS score is 15. GCS eye subscore is 4. GCS verbal subscore is 5. GCS motor subscore is 6.   Skin: Capillary refill takes less than 2 seconds. No rash noted.   Psychiatric: She has a normal mood and affect. Thought content normal.         ED Course   Procedures  Labs Reviewed   CBC W/ AUTO DIFFERENTIAL - Abnormal       Result Value    WBC 2.90 (*)     RBC 4.02      Hemoglobin 11.7 (*)     Hematocrit 35.0 (*)     MCV 87      MCH 29.1      MCHC 33.4      RDW 12.5      Platelets 165       MPV 12.3      Immature Granulocytes 0.0      Gran # (ANC) 1.2 (*)     Immature Grans (Abs) 0.00      Lymph # 1.3      Mono # 0.3      Eos # 0.0      Baso # 0.01      nRBC 0      Gran % 42.8      Lymph % 46.2      Mono % 9.7      Eosinophil % 1.0      Basophil % 0.3      Differential Method Automated     COMPREHENSIVE METABOLIC PANEL - Abnormal    Sodium 140      Potassium 3.8      Chloride 109      CO2 26      Glucose 79      BUN 14      Creatinine 0.7      Calcium 9.7      Total Protein 7.4      Albumin 4.6      Total Bilirubin 0.5      Alkaline Phosphatase 68      AST 18      ALT 18      eGFR >60.0      Anion Gap 5 (*)    MAGNESIUM    Magnesium 1.9     TROPONIN I HIGH SENSITIVITY    Troponin I High Sensitivity <2.3     B-TYPE NATRIURETIC PEPTIDE    BNP 12       EKG Readings: (Independently Interpreted)   Initial Reading: No STEMI. Rhythm: Normal Sinus Rhythm. Ectopy: No Ectopy. Conduction: Normal. ST Segments: Normal ST Segments. T Waves: Normal. Clinical Impression: Normal Sinus Rhythm       Imaging Results              X-Ray Chest AP Portable (Final result)  Result time 02/11/25 19:44:22      Final result by Akshat Martinez MD (02/11/25 19:44:22)                   Impression:      No acute findings.      Electronically signed by: Akshat Martinez  Date:    02/11/2025  Time:    19:44               Narrative:    EXAMINATION:  XR CHEST AP PORTABLE    CLINICAL HISTORY:  Chest Pain;    TECHNIQUE:  Single frontal view of the chest was performed.    COMPARISON:  05/20/2022    FINDINGS:  Lungs are clear. No focal consolidation. No pleural effusion. No pneumothorax. Normal heart size.                                       Medications   aspirin tablet 325 mg (325 mg Oral Given 2/11/25 2149)     Medical Decision Making  Jada Fuchs is a 54 y.o. female that presents emergency department for 2 weeks of midsternal chest pain that has nonradiating and not associated with shortness of breath, nausea, or  vomiting.  Vitals were stable.  Well-appearing female.  Lungs are clear.  Heart sounds within normal limits.  Abdominal exam benign.  Midsternal chest discomfort with palpation.  EKG shows normal sinus rhythm without acute ST segment or T-wave changes.  Troponin is negative.  Doubt ACS given the chronicity and EKG with negative troponin.  BNP within normal limits.  Doubt heart failure.  CBC with mild leukopenia and anemia, but otherwise unremarkable.  CMP within normal limits.  Chest x-ray shows no acute cardiopulmonary abnormality.  Given aspirin and patient is overall well-appearing.  Pain has resolved with aspirin.  Return precautions given.  Instructed follow up with primary care.  Prescribed Robaxin.    Risk  OTC drugs.  Prescription drug management.      Additional MDM:   Heart Score:    History:          Slightly suspicious.  ECG:             Normal  Age:               45-65 years  Risk factors: 1-2 risk factors  Troponin:       Less than or equal to normal limit  Heart Score = 2                                       Clinical Impression:  Final diagnoses:  [R07.9] Chest pain  [R07.89] Chest wall pain (Primary)          ED Disposition Condition    Discharge Stable          ED Prescriptions       Medication Sig Dispense Start Date End Date Auth. Provider    methocarbamoL (ROBAXIN) 750 MG Tab Take 1 tablet (750 mg total) by mouth 3 (three) times daily. for 5 days 15 tablet 2/11/2025 2/16/2025 Uriel Daniels MD          Follow-up Information       Follow up With Specialties Details Why Contact Info Additional Information    [facility] - Emergency Dept Emergency Medicine  As needed, If symptoms worsen [address] [id number]  [phone] 1st floor    Jerardo Merino MD Family Medicine In 3 days Follow up on ED visit [address]  [address]  [facility] [address]  [phone]                Uriel Daniels MD  02/12/25 0229

## 2025-02-12 NOTE — FIRST PROVIDER EVALUATION
" Emergency Department TeleTriage Encounter Note      CHIEF COMPLAINT    Chief Complaint   Patient presents with    Chest Pain     Sharp mid sternal pain started 2 wks ago "only felt when lying down"  Today started feeling it when was up moving around; constant.  Denies SOB, nausea       VITAL SIGNS   Initial Vitals [02/11/25 1833]   BP Pulse Resp Temp SpO2   (!) 149/93 90 18 98.6 °F (37 °C) 100 %      MAP       --            ALLERGIES    Review of patient's allergies indicates:   Allergen Reactions    Adderall [dextroamphetamine-amphetamine]      Severe sx hypertension    Lisinopril      UNDESIRABLE SIDE EFFECT       PROVIDER TRIAGE NOTE  Patient presents with constant chest pain. Initially it was present only when supine, but now present while up and doing activities. No SOB.       ORDERS  Labs Reviewed - No data to display    ED Orders (720h ago, onward)      Start Ordered     Status Ordering Provider    02/11/25 1824 02/11/25 1824  EKG 12-lead  Once         Completed by DAVID GREEN on 2/11/2025 at  6:24 PM MANA[facility] CORONA              Virtual Visit Note: The provider triage portion of this emergency department evaluation and documentation was performed via [employer], a HIPAA-compliant telemedicine application, in concert with a tele-presenter in the room. A face to face patient evaluation with one of my colleagues will occur once the patient is placed in an emergency department room.      DISCLAIMER: This note was prepared with M*[employer] voice recognition transcription software. Garbled syntax, mangled pronouns, and other bizarre constructions may be attributed to that software system.    "

## 2025-02-20 LAB
OHS QRS DURATION: 78 MS
OHS QTC CALCULATION: 436 MS

## 2025-04-16 ENCOUNTER — OFFICE VISIT (OUTPATIENT)
Dept: FAMILY MEDICINE | Facility: CLINIC | Age: 55
End: 2025-04-16
Payer: COMMERCIAL

## 2025-04-16 VITALS
TEMPERATURE: 98 F | OXYGEN SATURATION: 99 % | BODY MASS INDEX: 22.04 KG/M2 | HEART RATE: 74 BPM | DIASTOLIC BLOOD PRESSURE: 86 MMHG | SYSTOLIC BLOOD PRESSURE: 112 MMHG | WEIGHT: 116.63 LBS

## 2025-04-16 DIAGNOSIS — N83.201 BILATERAL OVARIAN CYSTS: ICD-10-CM

## 2025-04-16 DIAGNOSIS — I10 ESSENTIAL HYPERTENSION, BENIGN: ICD-10-CM

## 2025-04-16 DIAGNOSIS — D64.9 ANEMIA, UNSPECIFIED TYPE: ICD-10-CM

## 2025-04-16 DIAGNOSIS — R07.2 PRECORDIAL PAIN: Primary | ICD-10-CM

## 2025-04-16 DIAGNOSIS — N83.202 BILATERAL OVARIAN CYSTS: ICD-10-CM

## 2025-04-16 DIAGNOSIS — E04.1 THYROID NODULE: ICD-10-CM

## 2025-04-16 PROCEDURE — 99213 OFFICE O/P EST LOW 20 MIN: CPT | Mod: S$GLB,,, | Performed by: FAMILY MEDICINE

## 2025-04-16 PROCEDURE — 1159F MED LIST DOCD IN RCRD: CPT | Mod: CPTII,S$GLB,, | Performed by: FAMILY MEDICINE

## 2025-04-16 PROCEDURE — 99999 PR PBB SHADOW E&M-EST. PATIENT-LVL III: CPT | Mod: PBBFAC,,, | Performed by: FAMILY MEDICINE

## 2025-04-16 PROCEDURE — 3008F BODY MASS INDEX DOCD: CPT | Mod: CPTII,S$GLB,, | Performed by: FAMILY MEDICINE

## 2025-04-16 PROCEDURE — 3074F SYST BP LT 130 MM HG: CPT | Mod: CPTII,S$GLB,, | Performed by: FAMILY MEDICINE

## 2025-04-16 PROCEDURE — 3079F DIAST BP 80-89 MM HG: CPT | Mod: CPTII,S$GLB,, | Performed by: FAMILY MEDICINE
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
